# Patient Record
Sex: MALE | Race: WHITE | URBAN - METROPOLITAN AREA
[De-identification: names, ages, dates, MRNs, and addresses within clinical notes are randomized per-mention and may not be internally consistent; named-entity substitution may affect disease eponyms.]

---

## 2017-01-03 ENCOUNTER — OUTPATIENT (OUTPATIENT)
Dept: OUTPATIENT SERVICES | Facility: HOSPITAL | Age: 77
LOS: 1 days | Discharge: HOME | End: 2017-01-03

## 2017-06-13 ENCOUNTER — OUTPATIENT (OUTPATIENT)
Dept: OUTPATIENT SERVICES | Facility: HOSPITAL | Age: 77
LOS: 1 days | Discharge: HOME | End: 2017-06-13

## 2017-06-13 DIAGNOSIS — R07.9 CHEST PAIN, UNSPECIFIED: ICD-10-CM

## 2017-06-13 DIAGNOSIS — I82.409 ACUTE EMBOLISM AND THROMBOSIS OF UNSPECIFIED DEEP VEINS OF UNSPECIFIED LOWER EXTREMITY: ICD-10-CM

## 2017-06-13 DIAGNOSIS — I10 ESSENTIAL (PRIMARY) HYPERTENSION: ICD-10-CM

## 2017-06-13 DIAGNOSIS — N40.0 BENIGN PROSTATIC HYPERPLASIA WITHOUT LOWER URINARY TRACT SYMPTOMS: ICD-10-CM

## 2017-06-13 DIAGNOSIS — I35.0 NONRHEUMATIC AORTIC (VALVE) STENOSIS: ICD-10-CM

## 2017-06-22 ENCOUNTER — OUTPATIENT (OUTPATIENT)
Dept: OUTPATIENT SERVICES | Facility: HOSPITAL | Age: 77
LOS: 1 days | Discharge: HOME | End: 2017-06-22

## 2017-06-22 DIAGNOSIS — R07.9 CHEST PAIN, UNSPECIFIED: ICD-10-CM

## 2017-06-22 DIAGNOSIS — I35.0 NONRHEUMATIC AORTIC (VALVE) STENOSIS: ICD-10-CM

## 2017-06-22 DIAGNOSIS — I10 ESSENTIAL (PRIMARY) HYPERTENSION: ICD-10-CM

## 2017-06-22 DIAGNOSIS — N40.0 BENIGN PROSTATIC HYPERPLASIA WITHOUT LOWER URINARY TRACT SYMPTOMS: ICD-10-CM

## 2017-06-22 DIAGNOSIS — I82.409 ACUTE EMBOLISM AND THROMBOSIS OF UNSPECIFIED DEEP VEINS OF UNSPECIFIED LOWER EXTREMITY: ICD-10-CM

## 2017-06-28 DIAGNOSIS — I48.91 UNSPECIFIED ATRIAL FIBRILLATION: ICD-10-CM

## 2017-06-28 DIAGNOSIS — Z79.01 LONG TERM (CURRENT) USE OF ANTICOAGULANTS: ICD-10-CM

## 2017-06-29 ENCOUNTER — OUTPATIENT (OUTPATIENT)
Dept: OUTPATIENT SERVICES | Facility: HOSPITAL | Age: 77
LOS: 1 days | Discharge: HOME | End: 2017-06-29

## 2017-06-29 DIAGNOSIS — I48.91 UNSPECIFIED ATRIAL FIBRILLATION: ICD-10-CM

## 2017-06-29 DIAGNOSIS — N40.0 BENIGN PROSTATIC HYPERPLASIA WITHOUT LOWER URINARY TRACT SYMPTOMS: ICD-10-CM

## 2017-06-29 DIAGNOSIS — I82.409 ACUTE EMBOLISM AND THROMBOSIS OF UNSPECIFIED DEEP VEINS OF UNSPECIFIED LOWER EXTREMITY: ICD-10-CM

## 2017-06-29 DIAGNOSIS — I10 ESSENTIAL (PRIMARY) HYPERTENSION: ICD-10-CM

## 2017-06-29 DIAGNOSIS — R07.9 CHEST PAIN, UNSPECIFIED: ICD-10-CM

## 2017-06-29 DIAGNOSIS — I35.0 NONRHEUMATIC AORTIC (VALVE) STENOSIS: ICD-10-CM

## 2017-06-29 DIAGNOSIS — Z79.01 LONG TERM (CURRENT) USE OF ANTICOAGULANTS: ICD-10-CM

## 2017-07-12 ENCOUNTER — OUTPATIENT (OUTPATIENT)
Dept: OUTPATIENT SERVICES | Facility: HOSPITAL | Age: 77
LOS: 1 days | Discharge: HOME | End: 2017-07-12

## 2017-07-12 DIAGNOSIS — N40.0 BENIGN PROSTATIC HYPERPLASIA WITHOUT LOWER URINARY TRACT SYMPTOMS: ICD-10-CM

## 2017-07-12 DIAGNOSIS — Z79.01 LONG TERM (CURRENT) USE OF ANTICOAGULANTS: ICD-10-CM

## 2017-07-12 DIAGNOSIS — I35.0 NONRHEUMATIC AORTIC (VALVE) STENOSIS: ICD-10-CM

## 2017-07-12 DIAGNOSIS — I48.91 UNSPECIFIED ATRIAL FIBRILLATION: ICD-10-CM

## 2017-07-12 DIAGNOSIS — I82.409 ACUTE EMBOLISM AND THROMBOSIS OF UNSPECIFIED DEEP VEINS OF UNSPECIFIED LOWER EXTREMITY: ICD-10-CM

## 2017-07-12 DIAGNOSIS — I10 ESSENTIAL (PRIMARY) HYPERTENSION: ICD-10-CM

## 2017-07-12 DIAGNOSIS — R07.9 CHEST PAIN, UNSPECIFIED: ICD-10-CM

## 2017-08-02 ENCOUNTER — OUTPATIENT (OUTPATIENT)
Dept: OUTPATIENT SERVICES | Facility: HOSPITAL | Age: 77
LOS: 1 days | Discharge: HOME | End: 2017-08-02

## 2017-08-02 DIAGNOSIS — R07.9 CHEST PAIN, UNSPECIFIED: ICD-10-CM

## 2017-08-02 DIAGNOSIS — N40.0 BENIGN PROSTATIC HYPERPLASIA WITHOUT LOWER URINARY TRACT SYMPTOMS: ICD-10-CM

## 2017-08-02 DIAGNOSIS — I82.409 ACUTE EMBOLISM AND THROMBOSIS OF UNSPECIFIED DEEP VEINS OF UNSPECIFIED LOWER EXTREMITY: ICD-10-CM

## 2017-08-02 DIAGNOSIS — I10 ESSENTIAL (PRIMARY) HYPERTENSION: ICD-10-CM

## 2017-08-02 DIAGNOSIS — I48.91 UNSPECIFIED ATRIAL FIBRILLATION: ICD-10-CM

## 2017-08-02 DIAGNOSIS — Z79.01 LONG TERM (CURRENT) USE OF ANTICOAGULANTS: ICD-10-CM

## 2017-08-02 DIAGNOSIS — I35.0 NONRHEUMATIC AORTIC (VALVE) STENOSIS: ICD-10-CM

## 2017-08-31 ENCOUNTER — OUTPATIENT (OUTPATIENT)
Dept: OUTPATIENT SERVICES | Facility: HOSPITAL | Age: 77
LOS: 1 days | Discharge: HOME | End: 2017-08-31

## 2017-08-31 DIAGNOSIS — N40.0 BENIGN PROSTATIC HYPERPLASIA WITHOUT LOWER URINARY TRACT SYMPTOMS: ICD-10-CM

## 2017-08-31 DIAGNOSIS — R07.9 CHEST PAIN, UNSPECIFIED: ICD-10-CM

## 2017-08-31 DIAGNOSIS — I10 ESSENTIAL (PRIMARY) HYPERTENSION: ICD-10-CM

## 2017-08-31 DIAGNOSIS — I82.409 ACUTE EMBOLISM AND THROMBOSIS OF UNSPECIFIED DEEP VEINS OF UNSPECIFIED LOWER EXTREMITY: ICD-10-CM

## 2017-08-31 DIAGNOSIS — I35.0 NONRHEUMATIC AORTIC (VALVE) STENOSIS: ICD-10-CM

## 2017-08-31 DIAGNOSIS — Z79.01 LONG TERM (CURRENT) USE OF ANTICOAGULANTS: ICD-10-CM

## 2017-08-31 DIAGNOSIS — I48.91 UNSPECIFIED ATRIAL FIBRILLATION: ICD-10-CM

## 2017-09-05 ENCOUNTER — OUTPATIENT (OUTPATIENT)
Dept: OUTPATIENT SERVICES | Facility: HOSPITAL | Age: 77
LOS: 1 days | Discharge: HOME | End: 2017-09-05

## 2017-09-05 DIAGNOSIS — I48.91 UNSPECIFIED ATRIAL FIBRILLATION: ICD-10-CM

## 2017-09-05 DIAGNOSIS — I82.409 ACUTE EMBOLISM AND THROMBOSIS OF UNSPECIFIED DEEP VEINS OF UNSPECIFIED LOWER EXTREMITY: ICD-10-CM

## 2017-09-05 DIAGNOSIS — N40.0 BENIGN PROSTATIC HYPERPLASIA WITHOUT LOWER URINARY TRACT SYMPTOMS: ICD-10-CM

## 2017-09-05 DIAGNOSIS — I10 ESSENTIAL (PRIMARY) HYPERTENSION: ICD-10-CM

## 2017-09-05 DIAGNOSIS — Z79.01 LONG TERM (CURRENT) USE OF ANTICOAGULANTS: ICD-10-CM

## 2017-09-05 DIAGNOSIS — R07.9 CHEST PAIN, UNSPECIFIED: ICD-10-CM

## 2017-09-05 DIAGNOSIS — I35.0 NONRHEUMATIC AORTIC (VALVE) STENOSIS: ICD-10-CM

## 2017-09-07 DIAGNOSIS — Z79.01 LONG TERM (CURRENT) USE OF ANTICOAGULANTS: ICD-10-CM

## 2017-09-07 DIAGNOSIS — I48.91 UNSPECIFIED ATRIAL FIBRILLATION: ICD-10-CM

## 2017-09-14 ENCOUNTER — OUTPATIENT (OUTPATIENT)
Dept: OUTPATIENT SERVICES | Facility: HOSPITAL | Age: 77
LOS: 1 days | Discharge: HOME | End: 2017-09-14

## 2017-09-14 DIAGNOSIS — I10 ESSENTIAL (PRIMARY) HYPERTENSION: ICD-10-CM

## 2017-09-14 DIAGNOSIS — I82.409 ACUTE EMBOLISM AND THROMBOSIS OF UNSPECIFIED DEEP VEINS OF UNSPECIFIED LOWER EXTREMITY: ICD-10-CM

## 2017-09-14 DIAGNOSIS — I35.0 NONRHEUMATIC AORTIC (VALVE) STENOSIS: ICD-10-CM

## 2017-09-14 DIAGNOSIS — R07.9 CHEST PAIN, UNSPECIFIED: ICD-10-CM

## 2017-09-14 DIAGNOSIS — Z79.01 LONG TERM (CURRENT) USE OF ANTICOAGULANTS: ICD-10-CM

## 2017-09-14 DIAGNOSIS — N40.0 BENIGN PROSTATIC HYPERPLASIA WITHOUT LOWER URINARY TRACT SYMPTOMS: ICD-10-CM

## 2017-09-14 DIAGNOSIS — I48.91 UNSPECIFIED ATRIAL FIBRILLATION: ICD-10-CM

## 2017-10-12 ENCOUNTER — OUTPATIENT (OUTPATIENT)
Dept: OUTPATIENT SERVICES | Facility: HOSPITAL | Age: 77
LOS: 1 days | Discharge: HOME | End: 2017-10-12

## 2017-10-12 DIAGNOSIS — I10 ESSENTIAL (PRIMARY) HYPERTENSION: ICD-10-CM

## 2017-10-12 DIAGNOSIS — Z79.01 LONG TERM (CURRENT) USE OF ANTICOAGULANTS: ICD-10-CM

## 2017-10-12 DIAGNOSIS — R07.9 CHEST PAIN, UNSPECIFIED: ICD-10-CM

## 2017-10-12 DIAGNOSIS — N40.0 BENIGN PROSTATIC HYPERPLASIA WITHOUT LOWER URINARY TRACT SYMPTOMS: ICD-10-CM

## 2017-10-12 DIAGNOSIS — I35.0 NONRHEUMATIC AORTIC (VALVE) STENOSIS: ICD-10-CM

## 2017-10-12 DIAGNOSIS — I82.409 ACUTE EMBOLISM AND THROMBOSIS OF UNSPECIFIED DEEP VEINS OF UNSPECIFIED LOWER EXTREMITY: ICD-10-CM

## 2017-10-12 DIAGNOSIS — I48.91 UNSPECIFIED ATRIAL FIBRILLATION: ICD-10-CM

## 2017-10-26 ENCOUNTER — OUTPATIENT (OUTPATIENT)
Dept: OUTPATIENT SERVICES | Facility: HOSPITAL | Age: 77
LOS: 1 days | Discharge: HOME | End: 2017-10-26

## 2017-10-26 DIAGNOSIS — Z79.01 LONG TERM (CURRENT) USE OF ANTICOAGULANTS: ICD-10-CM

## 2017-10-26 DIAGNOSIS — I48.91 UNSPECIFIED ATRIAL FIBRILLATION: ICD-10-CM

## 2017-10-26 DIAGNOSIS — I10 ESSENTIAL (PRIMARY) HYPERTENSION: ICD-10-CM

## 2017-10-26 DIAGNOSIS — R07.9 CHEST PAIN, UNSPECIFIED: ICD-10-CM

## 2017-10-26 DIAGNOSIS — I35.0 NONRHEUMATIC AORTIC (VALVE) STENOSIS: ICD-10-CM

## 2017-10-26 DIAGNOSIS — I82.409 ACUTE EMBOLISM AND THROMBOSIS OF UNSPECIFIED DEEP VEINS OF UNSPECIFIED LOWER EXTREMITY: ICD-10-CM

## 2017-10-26 DIAGNOSIS — N40.0 BENIGN PROSTATIC HYPERPLASIA WITHOUT LOWER URINARY TRACT SYMPTOMS: ICD-10-CM

## 2017-11-02 ENCOUNTER — OUTPATIENT (OUTPATIENT)
Dept: OUTPATIENT SERVICES | Facility: HOSPITAL | Age: 77
LOS: 1 days | Discharge: HOME | End: 2017-11-02

## 2017-11-02 DIAGNOSIS — I10 ESSENTIAL (PRIMARY) HYPERTENSION: ICD-10-CM

## 2017-11-02 DIAGNOSIS — I35.0 NONRHEUMATIC AORTIC (VALVE) STENOSIS: ICD-10-CM

## 2017-11-02 DIAGNOSIS — I82.409 ACUTE EMBOLISM AND THROMBOSIS OF UNSPECIFIED DEEP VEINS OF UNSPECIFIED LOWER EXTREMITY: ICD-10-CM

## 2017-11-02 DIAGNOSIS — Z79.01 LONG TERM (CURRENT) USE OF ANTICOAGULANTS: ICD-10-CM

## 2017-11-02 DIAGNOSIS — N40.0 BENIGN PROSTATIC HYPERPLASIA WITHOUT LOWER URINARY TRACT SYMPTOMS: ICD-10-CM

## 2017-11-02 DIAGNOSIS — R07.9 CHEST PAIN, UNSPECIFIED: ICD-10-CM

## 2017-11-02 DIAGNOSIS — I48.91 UNSPECIFIED ATRIAL FIBRILLATION: ICD-10-CM

## 2017-11-09 ENCOUNTER — OUTPATIENT (OUTPATIENT)
Dept: OUTPATIENT SERVICES | Facility: HOSPITAL | Age: 77
LOS: 1 days | Discharge: HOME | End: 2017-11-09

## 2017-11-09 DIAGNOSIS — Z79.01 LONG TERM (CURRENT) USE OF ANTICOAGULANTS: ICD-10-CM

## 2017-11-09 DIAGNOSIS — I48.91 UNSPECIFIED ATRIAL FIBRILLATION: ICD-10-CM

## 2017-11-09 DIAGNOSIS — N40.0 BENIGN PROSTATIC HYPERPLASIA WITHOUT LOWER URINARY TRACT SYMPTOMS: ICD-10-CM

## 2017-11-09 DIAGNOSIS — I10 ESSENTIAL (PRIMARY) HYPERTENSION: ICD-10-CM

## 2017-11-09 DIAGNOSIS — I82.409 ACUTE EMBOLISM AND THROMBOSIS OF UNSPECIFIED DEEP VEINS OF UNSPECIFIED LOWER EXTREMITY: ICD-10-CM

## 2017-11-09 DIAGNOSIS — R07.9 CHEST PAIN, UNSPECIFIED: ICD-10-CM

## 2017-11-09 DIAGNOSIS — I35.0 NONRHEUMATIC AORTIC (VALVE) STENOSIS: ICD-10-CM

## 2017-11-22 ENCOUNTER — OUTPATIENT (OUTPATIENT)
Dept: OUTPATIENT SERVICES | Facility: HOSPITAL | Age: 77
LOS: 1 days | Discharge: HOME | End: 2017-11-22

## 2017-11-22 DIAGNOSIS — R07.9 CHEST PAIN, UNSPECIFIED: ICD-10-CM

## 2017-11-22 DIAGNOSIS — I10 ESSENTIAL (PRIMARY) HYPERTENSION: ICD-10-CM

## 2017-11-22 DIAGNOSIS — N40.0 BENIGN PROSTATIC HYPERPLASIA WITHOUT LOWER URINARY TRACT SYMPTOMS: ICD-10-CM

## 2017-11-22 DIAGNOSIS — I82.409 ACUTE EMBOLISM AND THROMBOSIS OF UNSPECIFIED DEEP VEINS OF UNSPECIFIED LOWER EXTREMITY: ICD-10-CM

## 2017-11-22 DIAGNOSIS — I35.0 NONRHEUMATIC AORTIC (VALVE) STENOSIS: ICD-10-CM

## 2017-11-22 DIAGNOSIS — Z79.01 LONG TERM (CURRENT) USE OF ANTICOAGULANTS: ICD-10-CM

## 2017-11-22 DIAGNOSIS — I48.91 UNSPECIFIED ATRIAL FIBRILLATION: ICD-10-CM

## 2017-12-13 ENCOUNTER — OUTPATIENT (OUTPATIENT)
Dept: OUTPATIENT SERVICES | Facility: HOSPITAL | Age: 77
LOS: 1 days | Discharge: HOME | End: 2017-12-13

## 2017-12-13 DIAGNOSIS — I48.91 UNSPECIFIED ATRIAL FIBRILLATION: ICD-10-CM

## 2017-12-13 DIAGNOSIS — I35.0 NONRHEUMATIC AORTIC (VALVE) STENOSIS: ICD-10-CM

## 2017-12-13 DIAGNOSIS — I10 ESSENTIAL (PRIMARY) HYPERTENSION: ICD-10-CM

## 2017-12-13 DIAGNOSIS — Z79.01 LONG TERM (CURRENT) USE OF ANTICOAGULANTS: ICD-10-CM

## 2017-12-13 DIAGNOSIS — I82.409 ACUTE EMBOLISM AND THROMBOSIS OF UNSPECIFIED DEEP VEINS OF UNSPECIFIED LOWER EXTREMITY: ICD-10-CM

## 2017-12-13 DIAGNOSIS — R07.9 CHEST PAIN, UNSPECIFIED: ICD-10-CM

## 2017-12-13 DIAGNOSIS — N40.0 BENIGN PROSTATIC HYPERPLASIA WITHOUT LOWER URINARY TRACT SYMPTOMS: ICD-10-CM

## 2018-01-03 ENCOUNTER — OUTPATIENT (OUTPATIENT)
Dept: OUTPATIENT SERVICES | Facility: HOSPITAL | Age: 78
LOS: 1 days | Discharge: HOME | End: 2018-01-03

## 2018-01-03 DIAGNOSIS — I48.91 UNSPECIFIED ATRIAL FIBRILLATION: ICD-10-CM

## 2018-01-03 DIAGNOSIS — I35.0 NONRHEUMATIC AORTIC (VALVE) STENOSIS: ICD-10-CM

## 2018-01-03 DIAGNOSIS — Z79.01 LONG TERM (CURRENT) USE OF ANTICOAGULANTS: ICD-10-CM

## 2018-01-03 DIAGNOSIS — N40.0 BENIGN PROSTATIC HYPERPLASIA WITHOUT LOWER URINARY TRACT SYMPTOMS: ICD-10-CM

## 2018-01-03 DIAGNOSIS — I82.409 ACUTE EMBOLISM AND THROMBOSIS OF UNSPECIFIED DEEP VEINS OF UNSPECIFIED LOWER EXTREMITY: ICD-10-CM

## 2018-01-03 DIAGNOSIS — R07.9 CHEST PAIN, UNSPECIFIED: ICD-10-CM

## 2018-01-03 DIAGNOSIS — I10 ESSENTIAL (PRIMARY) HYPERTENSION: ICD-10-CM

## 2018-01-10 ENCOUNTER — OUTPATIENT (OUTPATIENT)
Dept: OUTPATIENT SERVICES | Facility: HOSPITAL | Age: 78
LOS: 1 days | Discharge: HOME | End: 2018-01-10

## 2018-01-10 DIAGNOSIS — I35.0 NONRHEUMATIC AORTIC (VALVE) STENOSIS: ICD-10-CM

## 2018-01-10 DIAGNOSIS — R07.9 CHEST PAIN, UNSPECIFIED: ICD-10-CM

## 2018-01-10 DIAGNOSIS — Z79.01 LONG TERM (CURRENT) USE OF ANTICOAGULANTS: ICD-10-CM

## 2018-01-10 DIAGNOSIS — I10 ESSENTIAL (PRIMARY) HYPERTENSION: ICD-10-CM

## 2018-01-10 DIAGNOSIS — I82.409 ACUTE EMBOLISM AND THROMBOSIS OF UNSPECIFIED DEEP VEINS OF UNSPECIFIED LOWER EXTREMITY: ICD-10-CM

## 2018-01-10 DIAGNOSIS — N40.0 BENIGN PROSTATIC HYPERPLASIA WITHOUT LOWER URINARY TRACT SYMPTOMS: ICD-10-CM

## 2018-01-10 DIAGNOSIS — I48.91 UNSPECIFIED ATRIAL FIBRILLATION: ICD-10-CM

## 2018-01-22 PROBLEM — Z00.00 ENCOUNTER FOR PREVENTIVE HEALTH EXAMINATION: Status: ACTIVE | Noted: 2018-01-22

## 2018-01-24 ENCOUNTER — OUTPATIENT (OUTPATIENT)
Dept: OUTPATIENT SERVICES | Facility: HOSPITAL | Age: 78
LOS: 1 days | Discharge: HOME | End: 2018-01-24

## 2018-01-24 DIAGNOSIS — I48.91 UNSPECIFIED ATRIAL FIBRILLATION: ICD-10-CM

## 2018-01-24 DIAGNOSIS — Z79.01 LONG TERM (CURRENT) USE OF ANTICOAGULANTS: ICD-10-CM

## 2018-02-02 ENCOUNTER — APPOINTMENT (OUTPATIENT)
Dept: UROLOGY | Facility: CLINIC | Age: 78
End: 2018-02-02
Payer: MEDICARE

## 2018-02-02 VITALS
HEIGHT: 70 IN | HEART RATE: 61 BPM | BODY MASS INDEX: 27.2 KG/M2 | DIASTOLIC BLOOD PRESSURE: 64 MMHG | SYSTOLIC BLOOD PRESSURE: 155 MMHG | WEIGHT: 190 LBS

## 2018-02-02 DIAGNOSIS — Z80.9 FAMILY HISTORY OF MALIGNANT NEOPLASM, UNSPECIFIED: ICD-10-CM

## 2018-02-02 DIAGNOSIS — N40.0 BENIGN PROSTATIC HYPERPLASIA WITHOUT LOWER URINARY TRACT SYMPMS: ICD-10-CM

## 2018-02-02 DIAGNOSIS — Z78.9 OTHER SPECIFIED HEALTH STATUS: ICD-10-CM

## 2018-02-02 PROCEDURE — 99213 OFFICE O/P EST LOW 20 MIN: CPT

## 2018-02-02 RX ORDER — LOSARTAN POTASSIUM 25 MG/1
25 TABLET, FILM COATED ORAL
Qty: 180 | Refills: 0 | Status: ACTIVE | COMMUNITY
Start: 2017-12-18

## 2018-02-02 RX ORDER — ATORVASTATIN CALCIUM 10 MG/1
10 TABLET, FILM COATED ORAL
Qty: 90 | Refills: 0 | Status: ACTIVE | COMMUNITY
Start: 2017-12-15

## 2018-02-02 RX ORDER — TAMSULOSIN HYDROCHLORIDE 0.4 MG/1
0.4 CAPSULE ORAL
Qty: 180 | Refills: 0 | Status: ACTIVE | COMMUNITY
Start: 2017-01-13

## 2018-02-02 RX ORDER — FOLIC ACID 1 MG/1
1 TABLET ORAL
Qty: 90 | Refills: 0 | Status: ACTIVE | COMMUNITY
Start: 2017-11-08

## 2018-02-02 RX ORDER — ASPIRIN ENTERIC COATED TABLETS 81 MG 81 MG/1
81 TABLET, DELAYED RELEASE ORAL
Refills: 0 | Status: ACTIVE | COMMUNITY

## 2018-02-02 RX ORDER — FINASTERIDE 5 MG/1
5 TABLET, FILM COATED ORAL
Qty: 90 | Refills: 0 | Status: ACTIVE | COMMUNITY
Start: 2018-01-22

## 2018-02-04 DIAGNOSIS — R07.9 CHEST PAIN, UNSPECIFIED: ICD-10-CM

## 2018-02-04 DIAGNOSIS — I82.409 ACUTE EMBOLISM AND THROMBOSIS OF UNSPECIFIED DEEP VEINS OF UNSPECIFIED LOWER EXTREMITY: ICD-10-CM

## 2018-02-04 DIAGNOSIS — N40.0 BENIGN PROSTATIC HYPERPLASIA WITHOUT LOWER URINARY TRACT SYMPTOMS: ICD-10-CM

## 2018-02-04 DIAGNOSIS — I35.0 NONRHEUMATIC AORTIC (VALVE) STENOSIS: ICD-10-CM

## 2018-02-04 DIAGNOSIS — I10 ESSENTIAL (PRIMARY) HYPERTENSION: ICD-10-CM

## 2018-02-14 ENCOUNTER — OUTPATIENT (OUTPATIENT)
Dept: OUTPATIENT SERVICES | Facility: HOSPITAL | Age: 78
LOS: 1 days | Discharge: HOME | End: 2018-02-14

## 2018-02-14 DIAGNOSIS — I48.91 UNSPECIFIED ATRIAL FIBRILLATION: ICD-10-CM

## 2018-02-14 DIAGNOSIS — Z79.01 LONG TERM (CURRENT) USE OF ANTICOAGULANTS: ICD-10-CM

## 2018-02-21 ENCOUNTER — OUTPATIENT (OUTPATIENT)
Dept: OUTPATIENT SERVICES | Facility: HOSPITAL | Age: 78
LOS: 1 days | Discharge: HOME | End: 2018-02-21

## 2018-02-21 DIAGNOSIS — I48.91 UNSPECIFIED ATRIAL FIBRILLATION: ICD-10-CM

## 2018-02-21 DIAGNOSIS — Z79.01 LONG TERM (CURRENT) USE OF ANTICOAGULANTS: ICD-10-CM

## 2018-03-14 ENCOUNTER — OUTPATIENT (OUTPATIENT)
Dept: OUTPATIENT SERVICES | Facility: HOSPITAL | Age: 78
LOS: 1 days | Discharge: HOME | End: 2018-03-14

## 2018-03-14 DIAGNOSIS — Z79.01 LONG TERM (CURRENT) USE OF ANTICOAGULANTS: ICD-10-CM

## 2018-03-14 DIAGNOSIS — I48.91 UNSPECIFIED ATRIAL FIBRILLATION: ICD-10-CM

## 2018-04-12 ENCOUNTER — OUTPATIENT (OUTPATIENT)
Dept: OUTPATIENT SERVICES | Facility: HOSPITAL | Age: 78
LOS: 1 days | Discharge: HOME | End: 2018-04-12

## 2018-04-12 DIAGNOSIS — I48.91 UNSPECIFIED ATRIAL FIBRILLATION: ICD-10-CM

## 2018-04-12 DIAGNOSIS — Z79.01 LONG TERM (CURRENT) USE OF ANTICOAGULANTS: ICD-10-CM

## 2018-04-26 ENCOUNTER — OUTPATIENT (OUTPATIENT)
Dept: OUTPATIENT SERVICES | Facility: HOSPITAL | Age: 78
LOS: 1 days | Discharge: HOME | End: 2018-04-26

## 2018-04-26 DIAGNOSIS — Z79.01 LONG TERM (CURRENT) USE OF ANTICOAGULANTS: ICD-10-CM

## 2018-04-26 DIAGNOSIS — I48.91 UNSPECIFIED ATRIAL FIBRILLATION: ICD-10-CM

## 2018-05-17 ENCOUNTER — OUTPATIENT (OUTPATIENT)
Dept: OUTPATIENT SERVICES | Facility: HOSPITAL | Age: 78
LOS: 1 days | Discharge: HOME | End: 2018-05-17

## 2018-05-17 DIAGNOSIS — I48.91 UNSPECIFIED ATRIAL FIBRILLATION: ICD-10-CM

## 2018-05-17 DIAGNOSIS — Z79.01 LONG TERM (CURRENT) USE OF ANTICOAGULANTS: ICD-10-CM

## 2018-06-14 ENCOUNTER — OUTPATIENT (OUTPATIENT)
Dept: OUTPATIENT SERVICES | Facility: HOSPITAL | Age: 78
LOS: 1 days | Discharge: HOME | End: 2018-06-14

## 2018-06-14 DIAGNOSIS — I48.91 UNSPECIFIED ATRIAL FIBRILLATION: ICD-10-CM

## 2018-06-14 DIAGNOSIS — Z79.01 LONG TERM (CURRENT) USE OF ANTICOAGULANTS: ICD-10-CM

## 2018-07-12 ENCOUNTER — OUTPATIENT (OUTPATIENT)
Dept: OUTPATIENT SERVICES | Facility: HOSPITAL | Age: 78
LOS: 1 days | Discharge: HOME | End: 2018-07-12

## 2018-07-12 DIAGNOSIS — Z79.01 LONG TERM (CURRENT) USE OF ANTICOAGULANTS: ICD-10-CM

## 2018-07-12 DIAGNOSIS — I48.91 UNSPECIFIED ATRIAL FIBRILLATION: ICD-10-CM

## 2018-08-09 ENCOUNTER — OUTPATIENT (OUTPATIENT)
Dept: OUTPATIENT SERVICES | Facility: HOSPITAL | Age: 78
LOS: 1 days | Discharge: HOME | End: 2018-08-09

## 2018-08-09 DIAGNOSIS — Z79.01 LONG TERM (CURRENT) USE OF ANTICOAGULANTS: ICD-10-CM

## 2018-08-09 DIAGNOSIS — I48.91 UNSPECIFIED ATRIAL FIBRILLATION: ICD-10-CM

## 2018-09-14 ENCOUNTER — OUTPATIENT (OUTPATIENT)
Dept: OUTPATIENT SERVICES | Facility: HOSPITAL | Age: 78
LOS: 1 days | Discharge: HOME | End: 2018-09-14

## 2018-09-14 LAB
POCT INR: 2.6 RATIO — HIGH (ref 0.9–1.2)
POCT PT: 31.5 SEC — HIGH (ref 10–13.4)

## 2018-10-19 ENCOUNTER — OUTPATIENT (OUTPATIENT)
Dept: OUTPATIENT SERVICES | Facility: HOSPITAL | Age: 78
LOS: 1 days | Discharge: HOME | End: 2018-10-19

## 2018-10-19 DIAGNOSIS — I48.91 UNSPECIFIED ATRIAL FIBRILLATION: ICD-10-CM

## 2018-10-19 DIAGNOSIS — Z79.01 LONG TERM (CURRENT) USE OF ANTICOAGULANTS: ICD-10-CM

## 2018-10-19 LAB
POCT INR: 3.3 RATIO — HIGH (ref 0.9–1.2)
POCT PT: 39.3 SEC — HIGH (ref 10–13.4)

## 2018-11-06 ENCOUNTER — OUTPATIENT (OUTPATIENT)
Dept: OUTPATIENT SERVICES | Facility: HOSPITAL | Age: 78
LOS: 1 days | Discharge: HOME | End: 2018-11-06

## 2018-11-06 DIAGNOSIS — Z79.01 LONG TERM (CURRENT) USE OF ANTICOAGULANTS: ICD-10-CM

## 2018-11-06 DIAGNOSIS — I48.91 UNSPECIFIED ATRIAL FIBRILLATION: ICD-10-CM

## 2018-11-06 LAB
POCT INR: 2.7 RATIO — HIGH (ref 0.9–1.2)
POCT PT: 32.6 SEC — HIGH (ref 10–13.4)

## 2018-12-04 ENCOUNTER — OUTPATIENT (OUTPATIENT)
Dept: OUTPATIENT SERVICES | Facility: HOSPITAL | Age: 78
LOS: 1 days | Discharge: HOME | End: 2018-12-04

## 2018-12-04 DIAGNOSIS — Z79.01 LONG TERM (CURRENT) USE OF ANTICOAGULANTS: ICD-10-CM

## 2018-12-04 DIAGNOSIS — I48.91 UNSPECIFIED ATRIAL FIBRILLATION: ICD-10-CM

## 2018-12-04 LAB
POCT INR: 2.4 RATIO — HIGH (ref 0.9–1.2)
POCT PT: 29.3 SEC — HIGH (ref 10–13.4)

## 2019-01-11 ENCOUNTER — OUTPATIENT (OUTPATIENT)
Dept: OUTPATIENT SERVICES | Facility: HOSPITAL | Age: 79
LOS: 1 days | Discharge: HOME | End: 2019-01-11

## 2019-01-11 DIAGNOSIS — Z79.01 LONG TERM (CURRENT) USE OF ANTICOAGULANTS: ICD-10-CM

## 2019-01-11 DIAGNOSIS — I48.91 UNSPECIFIED ATRIAL FIBRILLATION: ICD-10-CM

## 2019-01-11 LAB
POCT INR: 4.4 RATIO — HIGH (ref 0.9–1.2)
POCT PT: 53 SEC — HIGH (ref 10–13.4)

## 2019-01-22 ENCOUNTER — OUTPATIENT (OUTPATIENT)
Dept: OUTPATIENT SERVICES | Facility: HOSPITAL | Age: 79
LOS: 1 days | Discharge: HOME | End: 2019-01-22

## 2019-01-22 DIAGNOSIS — I48.91 UNSPECIFIED ATRIAL FIBRILLATION: ICD-10-CM

## 2019-01-22 DIAGNOSIS — Z79.01 LONG TERM (CURRENT) USE OF ANTICOAGULANTS: ICD-10-CM

## 2019-01-22 LAB
POCT INR: 3 RATIO — HIGH (ref 0.9–1.2)
POCT PT: 36.1 SEC — HIGH (ref 10–13.4)

## 2019-02-13 ENCOUNTER — OUTPATIENT (OUTPATIENT)
Dept: OUTPATIENT SERVICES | Facility: HOSPITAL | Age: 79
LOS: 1 days | Discharge: HOME | End: 2019-02-13

## 2019-02-13 DIAGNOSIS — Z79.01 LONG TERM (CURRENT) USE OF ANTICOAGULANTS: ICD-10-CM

## 2019-02-13 DIAGNOSIS — I48.91 UNSPECIFIED ATRIAL FIBRILLATION: ICD-10-CM

## 2019-02-13 LAB
POCT INR: 1.8 RATIO — HIGH (ref 0.9–1.2)
POCT PT: 21.2 SEC — HIGH (ref 10–13.4)

## 2019-03-04 ENCOUNTER — OUTPATIENT (OUTPATIENT)
Dept: OUTPATIENT SERVICES | Facility: HOSPITAL | Age: 79
LOS: 1 days | Discharge: HOME | End: 2019-03-04

## 2019-03-04 DIAGNOSIS — I48.91 UNSPECIFIED ATRIAL FIBRILLATION: ICD-10-CM

## 2019-03-04 DIAGNOSIS — Z79.01 LONG TERM (CURRENT) USE OF ANTICOAGULANTS: ICD-10-CM

## 2019-03-04 LAB
POCT INR: 2.2 RATIO — HIGH (ref 0.9–1.2)
POCT PT: 26.4 SEC — HIGH (ref 10–13.4)

## 2019-04-02 ENCOUNTER — OUTPATIENT (OUTPATIENT)
Dept: OUTPATIENT SERVICES | Facility: HOSPITAL | Age: 79
LOS: 1 days | Discharge: HOME | End: 2019-04-02

## 2019-04-02 DIAGNOSIS — I48.91 UNSPECIFIED ATRIAL FIBRILLATION: ICD-10-CM

## 2019-04-02 DIAGNOSIS — Z79.01 LONG TERM (CURRENT) USE OF ANTICOAGULANTS: ICD-10-CM

## 2019-04-02 LAB
POCT INR: 2.2 RATIO — HIGH (ref 0.9–1.2)
POCT PT: 26.2 SEC — HIGH (ref 10–13.4)

## 2019-04-30 ENCOUNTER — APPOINTMENT (OUTPATIENT)
Dept: UROLOGY | Facility: CLINIC | Age: 79
End: 2019-04-30
Payer: MEDICARE

## 2019-04-30 VITALS — WEIGHT: 190 LBS | BODY MASS INDEX: 27.2 KG/M2 | HEIGHT: 70 IN

## 2019-04-30 PROCEDURE — 99213 OFFICE O/P EST LOW 20 MIN: CPT

## 2019-04-30 NOTE — HISTORY OF PRESENT ILLNESS
[FreeTextEntry1] : 79-year-old with BPH and lower urinary tract symptoms for which he takes tamsulosin 20 daily, finasteride 5 mg daily, and VESIcare 5 mg twice a day. While taking this he has a good urinary stream nocturia x1 but some increasing daytime frequency and urgency.

## 2019-04-30 NOTE — REVIEW OF SYSTEMS
[Fever] : no fever [Chest Pain] : no chest pain [Constipation] : constipation [Dysuria] : no dysuria [Confused] : no confusion

## 2019-04-30 NOTE — PHYSICAL EXAM
[General Appearance - In No Acute Distress] : no acute distress [Prostate Tenderness] : the prostate was not tender [No Prostate Nodules] : no prostate nodules [Prostate Size ___ (0-4)] : prostate size [unfilled] (scale: 0-4) [Oriented To Time, Place, And Person] : oriented to person, place, and time [Normal Station and Gait] : the gait and station were normal for the patient's age

## 2019-04-30 NOTE — ASSESSMENT
[FreeTextEntry1] : Patient states his symptoms are not much different from one year ago and are manageable. He does get some constipation but is managed with stool softeners.

## 2019-05-07 ENCOUNTER — OUTPATIENT (OUTPATIENT)
Dept: OUTPATIENT SERVICES | Facility: HOSPITAL | Age: 79
LOS: 1 days | Discharge: HOME | End: 2019-05-07

## 2019-05-07 DIAGNOSIS — Z79.01 LONG TERM (CURRENT) USE OF ANTICOAGULANTS: ICD-10-CM

## 2019-05-07 DIAGNOSIS — I48.91 UNSPECIFIED ATRIAL FIBRILLATION: ICD-10-CM

## 2019-05-07 LAB
POCT INR: 2.6 RATIO — HIGH (ref 0.9–1.2)
POCT PT: 31.4 SEC — HIGH (ref 10–13.4)

## 2019-07-16 ENCOUNTER — APPOINTMENT (OUTPATIENT)
Dept: OPHTHALMOLOGY | Facility: CLINIC | Age: 79
End: 2019-07-16
Payer: MEDICARE

## 2019-07-16 ENCOUNTER — NON-APPOINTMENT (OUTPATIENT)
Age: 79
End: 2019-07-16

## 2019-07-16 PROCEDURE — 92004 COMPRE OPH EXAM NEW PT 1/>: CPT

## 2020-05-01 ENCOUNTER — APPOINTMENT (OUTPATIENT)
Dept: UROLOGY | Facility: CLINIC | Age: 80
End: 2020-05-01

## 2020-07-15 ENCOUNTER — APPOINTMENT (OUTPATIENT)
Dept: UROLOGY | Facility: CLINIC | Age: 80
End: 2020-07-15
Payer: MEDICARE

## 2020-07-15 VITALS — TEMPERATURE: 97.9 F | BODY MASS INDEX: 27.2 KG/M2 | WEIGHT: 190 LBS | HEIGHT: 70 IN

## 2020-07-15 DIAGNOSIS — R35.0 FREQUENCY OF MICTURITION: ICD-10-CM

## 2020-07-15 PROCEDURE — 99213 OFFICE O/P EST LOW 20 MIN: CPT

## 2020-07-15 NOTE — REVIEW OF SYSTEMS
[Fever] : no fever [Feeling Poorly] : not feeling poorly [Chest Pain] : no chest pain [Cough] : no cough [Constipation] : no constipation [Diarrhea] : no diarrhea [Confused] : no confusion

## 2020-07-15 NOTE — HISTORY OF PRESENT ILLNESS
[FreeTextEntry1] : 80-year-old with lower urinary tract symptoms for which he takes tamsulosin 0.4 daily, finasteride 5 mg daily and VESIcare 10 mg q.h.s. Currently he has good urinary stream, nocturia x1, no change in urinary frequency and urgency

## 2020-07-15 NOTE — PHYSICAL EXAM
[General Appearance - In No Acute Distress] : no acute distress [Abdomen Soft] : soft [Costovertebral Angle Tenderness] : no ~M costovertebral angle tenderness [Abdomen Tenderness] : non-tender [Oriented To Time, Place, And Person] : oriented to person, place, and time [] : no respiratory distress [Edema] : no peripheral edema [Normal Station and Gait] : the gait and station were normal for the patient's age

## 2020-07-22 ENCOUNTER — APPOINTMENT (OUTPATIENT)
Dept: NEUROLOGY | Facility: CLINIC | Age: 80
End: 2020-07-22
Payer: MEDICARE

## 2020-07-22 DIAGNOSIS — Z86.79 PERSONAL HISTORY OF OTHER DISEASES OF THE CIRCULATORY SYSTEM: ICD-10-CM

## 2020-07-22 PROCEDURE — 99204 OFFICE O/P NEW MOD 45 MIN: CPT | Mod: 95

## 2020-07-22 RX ORDER — METOPROLOL SUCCINATE 25 MG/1
25 TABLET, EXTENDED RELEASE ORAL
Qty: 45 | Refills: 0 | Status: DISCONTINUED | COMMUNITY
Start: 2017-08-14 | End: 2020-07-22

## 2020-07-22 RX ORDER — AMOXICILLIN AND CLAVULANATE POTASSIUM 500; 125 MG/1; MG/1
500-125 TABLET, FILM COATED ORAL
Qty: 14 | Refills: 0 | Status: DISCONTINUED | COMMUNITY
Start: 2017-08-17 | End: 2020-07-22

## 2020-07-22 RX ORDER — OMEPRAZOLE 40 MG/1
40 CAPSULE, DELAYED RELEASE ORAL
Qty: 90 | Refills: 0 | Status: DISCONTINUED | COMMUNITY
Start: 2017-10-09 | End: 2020-07-22

## 2020-07-22 NOTE — ASSESSMENT
[FreeTextEntry1] : This is an 81 yo RHM w/ MMP p/w progressive memory deficits s/o dementai Alzheimers type with minimal behavioral changes at this time.  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  \par \par Recommendations:\par - MRI Brain NC\par - check b/w\par - start Namenda XR titration pack and continue 28 mg QD\par - RTC 4 months

## 2020-07-22 NOTE — HISTORY OF PRESENT ILLNESS
[Home] : at home, [unfilled] , at the time of the visit. [Other Location: e.g. Home (Enter Location, City,State)___] : at [unfilled] [FreeTextEntry1] : This is an 81 yo RHF p/w progressive memory loss particularly with short-term memory over the last 2 years noted by family.  Has been forgetting where he places wallet, objects around the house, loses things and difficulty with simple tasks.  Has not been paying bills and adhering to his schedule since his wife has taken most household duties.  Still does outside yardwork and continues to be fastidious about appearances but family has noted that he often downplays his memory and cognitive issues.  Wife has noted some paranoia particularly at night and he often believes people are trying to hide things around the house.  Went on hunting trip w/ friends last year and had to leave early due to paranoia at night.  No significant agitation or short-temperedness.  Family denies any new hobbies or significant personality issues.  No hallucinations or agitation. No new obsessions or compulsions.  No signs of depression or anxiety.  Family noticed some decline after AVR in 2015 but realized 2 years prior significant memory issues.  Pt unable to recognize grandchildren and daughter in law and more recently has forgotten his son and wifes names.\par \par Had fallen 3 times earlier this year and now requires cane to ambulate.  Denies any new ataxia or bradykinesia.  Denies any tremors or abnormal movements.  No recent weight changes.  [Verbal consent obtained from patient] : the patient, [unfilled]

## 2020-07-22 NOTE — PHYSICAL EXAM
[FreeTextEntry1] : NAD, pleasant, conversant, follows complex commands\par awake, alert and oriented to person only, gives child address when asked, "2005," "[place]," doesn't recall wife's name initially.  language fluent w/ intact repetition and naming.  no dysarthria. No obvious neglect or gaze preference.\par CN grossly intact. EOMI. no nystagmus. no facial asymmetry.  tongue midline. \par NF/E and lateral rotation w/ FROM\par MS without drift in upper and lower extremities b/l\par able to sustain arms above head\par unable to stand on 1 leg\par toe/heel walk normal \par no dysmetria, dysdiadokinesia\par slight intention tremor \par FTN normal

## 2020-11-05 ENCOUNTER — APPOINTMENT (OUTPATIENT)
Dept: NEUROLOGY | Facility: CLINIC | Age: 80
End: 2020-11-05

## 2021-05-28 ENCOUNTER — OUTPATIENT (OUTPATIENT)
Dept: OUTPATIENT SERVICES | Facility: HOSPITAL | Age: 81
LOS: 1 days | Discharge: HOME | End: 2021-05-28
Payer: MEDICARE

## 2021-05-28 DIAGNOSIS — I82.403 ACUTE EMBOLISM AND THROMBOSIS OF UNSPECIFIED DEEP VEINS OF LOWER EXTREMITY, BILATERAL: ICD-10-CM

## 2021-05-28 PROCEDURE — 93970 EXTREMITY STUDY: CPT | Mod: 26

## 2021-07-09 ENCOUNTER — OUTPATIENT (OUTPATIENT)
Dept: OUTPATIENT SERVICES | Facility: HOSPITAL | Age: 81
LOS: 1 days | Discharge: HOME | End: 2021-07-09
Payer: MEDICARE

## 2021-07-09 DIAGNOSIS — Z79.899 OTHER LONG TERM (CURRENT) DRUG THERAPY: ICD-10-CM

## 2021-07-09 PROCEDURE — 94060 EVALUATION OF WHEEZING: CPT | Mod: 26

## 2021-07-09 PROCEDURE — 94727 GAS DIL/WSHOT DETER LNG VOL: CPT | Mod: 26

## 2021-07-21 ENCOUNTER — APPOINTMENT (OUTPATIENT)
Dept: UROLOGY | Facility: CLINIC | Age: 81
End: 2021-07-21
Payer: MEDICARE

## 2021-07-21 VITALS — BODY MASS INDEX: 25.05 KG/M2 | HEIGHT: 70 IN | WEIGHT: 175 LBS

## 2021-07-21 PROCEDURE — 99213 OFFICE O/P EST LOW 20 MIN: CPT

## 2021-07-21 NOTE — HISTORY OF PRESENT ILLNESS
[FreeTextEntry1] : 81-year-old with BPH, lower urinary tract symptoms, on tamsulosin 0.4 daily, finasteride 5 mg daily, and VESIcare 10 mg daily. While on this regimen he has good urinary stream, nocturia x0-1, no frequency urgency, no hematuria, no dysuria

## 2021-07-21 NOTE — REVIEW OF SYSTEMS
[Feeling Poorly] : not feeling poorly [Discharge From Eyes] : no purulent discharge from the eyes [Nasal Discharge] : no nasal discharge [Chest Pain] : no chest pain [Cough] : no cough [Constipation] : no constipation [Dysuria] : no dysuria

## 2021-07-21 NOTE — ASSESSMENT
[FreeTextEntry1] : Stable BPH and well-controlled urinary symptoms. Continue tamsulosin, finasteride and VESIcare daily [Urinary Symptom or Sign (788.99\R39.89)] : implantation

## 2022-02-01 ENCOUNTER — TRANSCRIPTION ENCOUNTER (OUTPATIENT)
Age: 82
End: 2022-02-01

## 2022-07-11 ENCOUNTER — OUTPATIENT (OUTPATIENT)
Dept: OUTPATIENT SERVICES | Facility: HOSPITAL | Age: 82
LOS: 1 days | Discharge: HOME | End: 2022-07-11
Payer: MEDICARE

## 2022-07-11 DIAGNOSIS — Z79.899 OTHER LONG TERM (CURRENT) DRUG THERAPY: ICD-10-CM

## 2022-07-11 PROCEDURE — 94060 EVALUATION OF WHEEZING: CPT | Mod: 26

## 2022-07-11 PROCEDURE — 94726 PLETHYSMOGRAPHY LUNG VOLUMES: CPT | Mod: 26

## 2022-07-11 PROCEDURE — 94729 DIFFUSING CAPACITY: CPT | Mod: 26

## 2022-07-22 ENCOUNTER — APPOINTMENT (OUTPATIENT)
Dept: UROLOGY | Facility: CLINIC | Age: 82
End: 2022-07-22

## 2022-07-22 PROCEDURE — 99213 OFFICE O/P EST LOW 20 MIN: CPT

## 2022-07-22 NOTE — PHYSICAL EXAM
[General Appearance - In No Acute Distress] : no acute distress [Prostate Tenderness] : the prostate was not tender [No Prostate Nodules] : no prostate nodules [Prostate Size ___ (0-4)] : prostate size [unfilled] (scale: 0-4) [] : no respiratory distress [Oriented To Time, Place, And Person] : oriented to person, place, and time [FreeTextEntry1] : Uses a cane

## 2022-07-22 NOTE — HISTORY OF PRESENT ILLNESS
[FreeTextEntry1] : 82-year-old with BPH lower urinary tract symptoms on tamsulosin 0.4 daily, finasteride 5 mg daily, and Vesicare 10 mg daily.  While on his regimen he has good urinary stream, nocturia x0\par , No frequency urgency.  His symptoms are stable.

## 2022-07-22 NOTE — REVIEW OF SYSTEMS
[Fever] : no fever [Sore Throat] : no sore throat [Shortness Of Breath] : no shortness of breath [Cough] : no cough [Constipation] : no constipation [Diarrhea] : no diarrhea

## 2022-07-22 NOTE — ASSESSMENT
[FreeTextEntry1] : Stable BPH.  Continue tamsulosin 0.4 daily, finasteride 5 mg daily, Vesicare 10 mg daily [Urinary Symptom or Sign (788.99\R39.89)] : implantation

## 2023-07-25 ENCOUNTER — APPOINTMENT (OUTPATIENT)
Dept: UROLOGY | Facility: CLINIC | Age: 83
End: 2023-07-25
Payer: MEDICARE

## 2023-07-25 VITALS
TEMPERATURE: 97.6 F | SYSTOLIC BLOOD PRESSURE: 148 MMHG | RESPIRATION RATE: 16 BRPM | WEIGHT: 175 LBS | BODY MASS INDEX: 25.05 KG/M2 | HEART RATE: 61 BPM | DIASTOLIC BLOOD PRESSURE: 62 MMHG | HEIGHT: 70 IN | OXYGEN SATURATION: 98 %

## 2023-07-25 DIAGNOSIS — N40.1 BENIGN PROSTATIC HYPERPLASIA WITH LOWER URINARY TRACT SYMPMS: ICD-10-CM

## 2023-07-25 DIAGNOSIS — R39.9 UNSPECIFIED SYMPTOMS AND SIGNS INVOLVING THE GENITOURINARY SYSTEM: ICD-10-CM

## 2023-07-25 DIAGNOSIS — N13.8 BENIGN PROSTATIC HYPERPLASIA WITH LOWER URINARY TRACT SYMPMS: ICD-10-CM

## 2023-07-25 PROCEDURE — 99213 OFFICE O/P EST LOW 20 MIN: CPT

## 2023-07-25 NOTE — ASSESSMENT
[FreeTextEntry1] : Stable BPH and lower urinary tract symptoms.  Discussed with family member  current guidelines regarding prostate cancer screening [Urinary Symptom or Sign (788.99\R39.89)] : implantation

## 2023-07-25 NOTE — HISTORY OF PRESENT ILLNESS
[FreeTextEntry1] : 83-year-old with BPH, lower urinary tract symptoms, dementia continuing on tamsulosin 0.4 daily, finasteride 5 mg daily and Solifenacin 10 mg daily.  While on this he has variable urinary stream but is not bothered by it

## 2024-01-10 ENCOUNTER — RX RENEWAL (OUTPATIENT)
Age: 84
End: 2024-01-10

## 2024-01-10 RX ORDER — SOLIFENACIN SUCCINATE 10 MG/1
10 TABLET ORAL
Qty: 90 | Refills: 3 | Status: ACTIVE | COMMUNITY
Start: 2023-08-16 | End: 1900-01-01

## 2024-04-04 ENCOUNTER — APPOINTMENT (OUTPATIENT)
Dept: NEUROLOGY | Facility: CLINIC | Age: 84
End: 2024-04-04
Payer: MEDICARE

## 2024-04-04 VITALS
HEIGHT: 70 IN | BODY MASS INDEX: 24.62 KG/M2 | HEART RATE: 58 BPM | WEIGHT: 172 LBS | OXYGEN SATURATION: 98 % | DIASTOLIC BLOOD PRESSURE: 68 MMHG | RESPIRATION RATE: 16 BRPM | SYSTOLIC BLOOD PRESSURE: 148 MMHG

## 2024-04-04 DIAGNOSIS — G62.9 POLYNEUROPATHY, UNSPECIFIED: ICD-10-CM

## 2024-04-04 DIAGNOSIS — Z86.79 PERSONAL HISTORY OF OTHER DISEASES OF THE CIRCULATORY SYSTEM: ICD-10-CM

## 2024-04-04 DIAGNOSIS — I10 ESSENTIAL (PRIMARY) HYPERTENSION: ICD-10-CM

## 2024-04-04 DIAGNOSIS — G24.3 SPASMODIC TORTICOLLIS: ICD-10-CM

## 2024-04-04 DIAGNOSIS — R41.0 DISORIENTATION, UNSPECIFIED: ICD-10-CM

## 2024-04-04 DIAGNOSIS — G30.1 ALZHEIMER'S DISEASE WITH LATE ONSET: ICD-10-CM

## 2024-04-04 DIAGNOSIS — Z82.49 FAMILY HISTORY OF ISCHEMIC HEART DISEASE AND OTHER DISEASES OF THE CIRCULATORY SYSTEM: ICD-10-CM

## 2024-04-04 DIAGNOSIS — Z86.718 PERSONAL HISTORY OF OTHER VENOUS THROMBOSIS AND EMBOLISM: ICD-10-CM

## 2024-04-04 DIAGNOSIS — H91.90 UNSPECIFIED HEARING LOSS, UNSPECIFIED EAR: ICD-10-CM
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
## 2024-04-04 DIAGNOSIS — F41.9 ANXIETY DISORDER, UNSPECIFIED: ICD-10-CM

## 2024-04-04 DIAGNOSIS — F02.80 ALZHEIMER'S DISEASE WITH LATE ONSET: ICD-10-CM

## 2024-04-04 DIAGNOSIS — G47.30 SLEEP APNEA, UNSPECIFIED: ICD-10-CM

## 2024-04-04 DIAGNOSIS — Z86.69 PERSONAL HISTORY OF OTHER DISEASES OF THE NERVOUS SYSTEM AND SENSE ORGANS: ICD-10-CM

## 2024-04-04 DIAGNOSIS — R44.1 VISUAL HALLUCINATIONS: ICD-10-CM

## 2024-04-04 PROCEDURE — 99204 OFFICE O/P NEW MOD 45 MIN: CPT

## 2024-04-04 PROCEDURE — G2211 COMPLEX E/M VISIT ADD ON: CPT

## 2024-04-04 RX ORDER — MEMANTINE HYDROCHLORIDE 7-14-21-28
7 & 14 & 21 &28 KIT ORAL DAILY
Qty: 1 | Refills: 0 | Status: DISCONTINUED | COMMUNITY
Start: 2020-07-22 | End: 2024-04-04

## 2024-04-04 RX ORDER — QUETIAPINE FUMARATE 50 MG/1
50 TABLET ORAL
Refills: 0 | Status: ACTIVE | COMMUNITY

## 2024-04-04 RX ORDER — MEMANTINE HYDROCHLORIDE 28 MG/1
28 CAPSULE, EXTENDED RELEASE ORAL
Qty: 30 | Refills: 5 | Status: DISCONTINUED | COMMUNITY
Start: 2020-07-22 | End: 2024-04-04

## 2024-04-04 RX ORDER — RIVAROXABAN 20 MG/1
20 TABLET, FILM COATED ORAL
Refills: 0 | Status: ACTIVE | COMMUNITY

## 2024-04-04 RX ORDER — HYDROCHLOROTHIAZIDE 12.5 MG/1
12.5 CAPSULE ORAL
Qty: 45 | Refills: 0 | Status: DISCONTINUED | COMMUNITY
Start: 2018-01-05 | End: 2024-04-04

## 2024-04-04 RX ORDER — SOLIFENACIN SUCCINATE 10 MG/1
TABLET ORAL
Refills: 0 | Status: DISCONTINUED | COMMUNITY
End: 2024-04-04

## 2024-04-04 RX ORDER — SPIRONOLACTONE 25 MG/1
25 TABLET ORAL DAILY
Refills: 0 | Status: ACTIVE | COMMUNITY

## 2024-04-04 RX ORDER — OMEPRAZOLE 20 MG/1
20 CAPSULE, DELAYED RELEASE ORAL
Qty: 30 | Refills: 0 | Status: DISCONTINUED | COMMUNITY
Start: 2017-12-18 | End: 2024-04-04

## 2024-04-04 RX ORDER — SOLIFENACIN SUCCINATE 5 MG/1
5 TABLET, FILM COATED ORAL
Refills: 0 | Status: DISCONTINUED | COMMUNITY
End: 2024-04-04

## 2024-04-04 RX ORDER — RIVAROXABAN 2.5 MG/1
TABLET, FILM COATED ORAL
Refills: 0 | Status: DISCONTINUED | COMMUNITY
End: 2024-04-04

## 2024-04-04 RX ORDER — SERTRALINE HYDROCHLORIDE 50 MG/1
50 TABLET, FILM COATED ORAL
Refills: 0 | Status: ACTIVE | COMMUNITY

## 2024-04-04 RX ORDER — AMIODARONE HYDROCHLORIDE 100 MG/1
100 TABLET ORAL DAILY
Refills: 0 | Status: ACTIVE | COMMUNITY

## 2024-04-04 NOTE — DISCUSSION/SUMMARY
[FreeTextEntry1] : He has multiple areas of cognitive functioning with impairment in today's exam. He also has cervical dystonia, for which he is asymptomatic. Mobility is impaired by polyneuropathy. Can not rule out central origin for now. Will start neurological work up by brain MRI and EEG study. Continue sertraline and quetiapine for now.

## 2024-04-04 NOTE — PHYSICAL EXAM
[General Appearance - Alert] : alert [General Appearance - In No Acute Distress] : in no acute distress [General Appearance - Well Nourished] : well nourished [Oriented to Person] : oriented to person [Oriented to Place] : disoriented to place [Oriented to Time] : disoriented to time [Person] : oriented to person [Place] : disoriented to place [Time] : disoriented to time [Short Term Intact] : short term memory impaired [Remote Intact] : remote memory impaired [Registration Intact] : recent registration memory impaired [Span Intact] : the attention span was decreased [Concentration Intact] : a decrease in concentrating ability was observed [Visual Intact] : visual attention was ~T not ~L decreased [Naming Objects] : difficulty naming common objects [Repeating Phrases] : no difficulty repeating a phrase [Writing A Sentence] : difficulty writing a sentence [Fluency] : fluency not intact [Comprehension] : comprehension intact [Reading] : difficulty reading [Current Events] : inadequate knowledge of current events [Past History] : inadequate knowledge of personal past history [Vocabulary] : inadequate range of vocabulary [Cranial Nerves Optic (II)] : visual acuity intact bilaterally,  visual fields full to confrontation, pupils equal round and reactive to light [Cranial Nerves Oculomotor (III)] : extraocular motion intact [Cranial Nerves Trigeminal (V)] : facial sensation intact symmetrically [Cranial Nerves Facial (VII)] : face symmetrical [Cranial Nerves Glossopharyngeal (IX)] : tongue and palate midline [Cranial Nerves Accessory (XI - Cranial And Spinal)] : head turning and shoulder shrug symmetric [Cranial Nerves Hypoglossal (XII)] : there was no tongue deviation with protrusion [Involuntary Movements] : no involuntary movements were seen [No Muscle Atrophy] : normal bulk in all four extremities [Motor Handedness Right-Handed] : the patient is right hand dominant [Sensation Tactile Decrease] : light touch was intact [Romberg's Sign] : a positive Romberg's sign was present [Limited Balance] : the patient's balance was impaired [Past-pointing] : there was no past-pointing [Tremor] : no tremor present [Dysdiadochokinesia Bilaterally] : not present [Coordination - Dysmetria Impaired Finger-to-Nose Bilateral] : not present [Coordination - Dysmetria Impaired Heel-to-Shin Bilateral] : not present [3+] : Brachioradialis left 3+ [2+] : Patella left 2+ [0] : Ankle jerk left 0 [Plantar Reflex Right Only] : normal on the right [Plantar Reflex Left Only] : normal on the left [___] : absent on the right [___] : absent on the left [Primitive Reflexes] : primitive reflexes were present [Snout Present] : snout reflex present [Primitive Reflexes Palmomental] : palmomental reflex present bilaterally [Glabellar Reflex] : the glabellar reflex was present [FreeTextEntry6] : No limitation of upper and lower extremity usage. Not able to obtain full muscle strength grading [FreeTextEntry7] : Not able to test beyond light touch [No Spinal Tenderness] : no spinal tenderness [FreeTextEntry1] : medium based gait, small steps, some degree of instability

## 2024-04-04 NOTE — HISTORY OF PRESENT ILLNESS
[FreeTextEntry1] : He was seen by Dr. Juan in 2020 and reported memory difficulty for about 2 years at that time. He was instructed to have brain MRI and start Namenda. However, he did not do the recommended test and treatment.  Over time, he gets worse and especially the last 6 months. He also developed visual hallucinations, anxiety and insomnia. Dr. Minor, PMD started him on quetiapine. Wife also signed him up for [facility] day program three times a week. Quality of living has remarkably improved since then.  He still has restless night at times. He still talks to people in the content of his visual hallucinations, mostly family members already passed on. He mispronounces words often. He does not always follow instruction. He does not answer questions being asked. He does purposeless movements with his hands at times. He stares a lot. Sometimes, she does not even recognize his wife.  Although he wears depends. Wife does not feel that he is incontinent. He walks with cane because of right knee pain. He had small falls lately without injury. He denies any pain today. Wife states that he feels dizzy occasionally. He denies visual, auditory, motor or sensory complaints. He has hearing aid, but not using.  Recent lab done on 10/4/24, which shows mild anemia, normal B12 level. He has ongoing follow up by hematologist.

## 2024-04-18 ENCOUNTER — APPOINTMENT (OUTPATIENT)
Dept: NEUROLOGY | Facility: CLINIC | Age: 84
End: 2024-04-18
Payer: MEDICARE

## 2024-04-18 PROCEDURE — 95816 EEG AWAKE AND DROWSY: CPT

## 2024-04-20 ENCOUNTER — NON-APPOINTMENT (OUTPATIENT)
Age: 84
End: 2024-04-20

## 2024-06-24 ENCOUNTER — INPATIENT (INPATIENT)
Facility: HOSPITAL | Age: 84
LOS: 5 days | Discharge: HOME | DRG: 312 | End: 2024-06-30
Attending: STUDENT IN AN ORGANIZED HEALTH CARE EDUCATION/TRAINING PROGRAM | Admitting: STUDENT IN AN ORGANIZED HEALTH CARE EDUCATION/TRAINING PROGRAM
Payer: MEDICARE

## 2024-06-24 VITALS
SYSTOLIC BLOOD PRESSURE: 171 MMHG | DIASTOLIC BLOOD PRESSURE: 74 MMHG | TEMPERATURE: 98 F | OXYGEN SATURATION: 99 % | RESPIRATION RATE: 18 BRPM | HEART RATE: 61 BPM
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
## 2024-06-24 LAB
ALBUMIN SERPL ELPH-MCNC: 4 G/DL — SIGNIFICANT CHANGE UP (ref 3.5–5.2)
ALP SERPL-CCNC: 68 U/L — SIGNIFICANT CHANGE UP (ref 30–115)
ALT FLD-CCNC: 12 U/L — SIGNIFICANT CHANGE UP (ref 0–41)
ANION GAP SERPL CALC-SCNC: 12 MMOL/L — SIGNIFICANT CHANGE UP (ref 7–14)
APTT BLD: 38.8 SEC — SIGNIFICANT CHANGE UP (ref 27–39.2)
AST SERPL-CCNC: 21 U/L — SIGNIFICANT CHANGE UP (ref 0–41)
BASOPHILS # BLD AUTO: 0.05 K/UL — SIGNIFICANT CHANGE UP (ref 0–0.2)
BASOPHILS NFR BLD AUTO: 0.8 % — SIGNIFICANT CHANGE UP (ref 0–1)
BILIRUB SERPL-MCNC: 0.8 MG/DL — SIGNIFICANT CHANGE UP (ref 0.2–1.2)
BUN SERPL-MCNC: 35 MG/DL — HIGH (ref 10–20)
CALCIUM SERPL-MCNC: 10 MG/DL — SIGNIFICANT CHANGE UP (ref 8.4–10.4)
CHLORIDE SERPL-SCNC: 100 MMOL/L — SIGNIFICANT CHANGE UP (ref 98–110)
CK SERPL-CCNC: 120 U/L — SIGNIFICANT CHANGE UP (ref 0–225)
CO2 SERPL-SCNC: 27 MMOL/L — SIGNIFICANT CHANGE UP (ref 17–32)
CREAT SERPL-MCNC: 1.6 MG/DL — HIGH (ref 0.7–1.5)
EGFR: 42 ML/MIN/1.73M2 — LOW
EOSINOPHIL # BLD AUTO: 0.16 K/UL — SIGNIFICANT CHANGE UP (ref 0–0.7)
EOSINOPHIL NFR BLD AUTO: 2.5 % — SIGNIFICANT CHANGE UP (ref 0–8)
ETHANOL SERPL-MCNC: <10 MG/DL — SIGNIFICANT CHANGE UP
GLUCOSE SERPL-MCNC: 88 MG/DL — SIGNIFICANT CHANGE UP (ref 70–99)
HCT VFR BLD CALC: 32.4 % — LOW (ref 42–52)
HGB BLD-MCNC: 11.1 G/DL — LOW (ref 14–18)
IMM GRANULOCYTES NFR BLD AUTO: 0.6 % — HIGH (ref 0.1–0.3)
INR BLD: 1.8 RATIO — HIGH (ref 0.65–1.3)
LACTATE SERPL-SCNC: 1.1 MMOL/L — SIGNIFICANT CHANGE UP (ref 0.7–2)
LIDOCAIN IGE QN: 25 U/L — SIGNIFICANT CHANGE UP (ref 7–60)
LYMPHOCYTES # BLD AUTO: 2.05 K/UL — SIGNIFICANT CHANGE UP (ref 1.2–3.4)
LYMPHOCYTES # BLD AUTO: 31.5 % — SIGNIFICANT CHANGE UP (ref 20.5–51.1)
MCHC RBC-ENTMCNC: 32.1 PG — HIGH (ref 27–31)
MCHC RBC-ENTMCNC: 34.3 G/DL — SIGNIFICANT CHANGE UP (ref 32–37)
MCV RBC AUTO: 93.6 FL — SIGNIFICANT CHANGE UP (ref 80–94)
MONOCYTES # BLD AUTO: 0.8 K/UL — HIGH (ref 0.1–0.6)
MONOCYTES NFR BLD AUTO: 12.3 % — HIGH (ref 1.7–9.3)
NEUTROPHILS # BLD AUTO: 3.41 K/UL — SIGNIFICANT CHANGE UP (ref 1.4–6.5)
NEUTROPHILS NFR BLD AUTO: 52.3 % — SIGNIFICANT CHANGE UP (ref 42.2–75.2)
NRBC # BLD: 0 /100 WBCS — SIGNIFICANT CHANGE UP (ref 0–0)
PLATELET # BLD AUTO: 171 K/UL — SIGNIFICANT CHANGE UP (ref 130–400)
PMV BLD: 11.4 FL — HIGH (ref 7.4–10.4)
POTASSIUM SERPL-MCNC: 4.6 MMOL/L — SIGNIFICANT CHANGE UP (ref 3.5–5)
POTASSIUM SERPL-SCNC: 4.6 MMOL/L — SIGNIFICANT CHANGE UP (ref 3.5–5)
PROT SERPL-MCNC: 6.6 G/DL — SIGNIFICANT CHANGE UP (ref 6–8)
PROTHROM AB SERPL-ACNC: 20.6 SEC — HIGH (ref 9.95–12.87)
RBC # BLD: 3.46 M/UL — LOW (ref 4.7–6.1)
RBC # FLD: 14 % — SIGNIFICANT CHANGE UP (ref 11.5–14.5)
SODIUM SERPL-SCNC: 139 MMOL/L — SIGNIFICANT CHANGE UP (ref 135–146)
TROPONIN T, HIGH SENSITIVITY RESULT: 31 NG/L — HIGH (ref 6–21)
WBC # BLD: 6.51 K/UL — SIGNIFICANT CHANGE UP (ref 4.8–10.8)
WBC # FLD AUTO: 6.51 K/UL — SIGNIFICANT CHANGE UP (ref 4.8–10.8)

## 2024-06-24 PROCEDURE — 82962 GLUCOSE BLOOD TEST: CPT

## 2024-06-24 PROCEDURE — 93306 TTE W/DOPPLER COMPLETE: CPT

## 2024-06-24 PROCEDURE — 99223 1ST HOSP IP/OBS HIGH 75: CPT

## 2024-06-24 PROCEDURE — 80061 LIPID PANEL: CPT

## 2024-06-24 PROCEDURE — 70450 CT HEAD/BRAIN W/O DYE: CPT | Mod: 26,MC

## 2024-06-24 PROCEDURE — 80048 BASIC METABOLIC PNL TOTAL CA: CPT

## 2024-06-24 PROCEDURE — 83036 HEMOGLOBIN GLYCOSYLATED A1C: CPT

## 2024-06-24 PROCEDURE — 97116 GAIT TRAINING THERAPY: CPT | Mod: GP

## 2024-06-24 PROCEDURE — 36415 COLL VENOUS BLD VENIPUNCTURE: CPT

## 2024-06-24 PROCEDURE — 92526 ORAL FUNCTION THERAPY: CPT | Mod: GN

## 2024-06-24 PROCEDURE — 97166 OT EVAL MOD COMPLEX 45 MIN: CPT | Mod: GO

## 2024-06-24 PROCEDURE — 84484 ASSAY OF TROPONIN QUANT: CPT

## 2024-06-24 PROCEDURE — 72170 X-RAY EXAM OF PELVIS: CPT | Mod: 26

## 2024-06-24 PROCEDURE — 72125 CT NECK SPINE W/O DYE: CPT | Mod: 26,MC

## 2024-06-24 PROCEDURE — 97110 THERAPEUTIC EXERCISES: CPT | Mod: GP

## 2024-06-24 PROCEDURE — 92610 EVALUATE SWALLOWING FUNCTION: CPT | Mod: GN

## 2024-06-24 PROCEDURE — 93005 ELECTROCARDIOGRAM TRACING: CPT

## 2024-06-24 PROCEDURE — 85025 COMPLETE CBC W/AUTO DIFF WBC: CPT

## 2024-06-24 PROCEDURE — 85027 COMPLETE CBC AUTOMATED: CPT

## 2024-06-24 PROCEDURE — 83735 ASSAY OF MAGNESIUM: CPT

## 2024-06-24 PROCEDURE — 99285 EMERGENCY DEPT VISIT HI MDM: CPT | Mod: GC

## 2024-06-24 PROCEDURE — 71045 X-RAY EXAM CHEST 1 VIEW: CPT | Mod: 26

## 2024-06-24 PROCEDURE — 74177 CT ABD & PELVIS W/CONTRAST: CPT | Mod: 26,MC

## 2024-06-24 PROCEDURE — 71260 CT THORAX DX C+: CPT | Mod: 26,MC

## 2024-06-24 PROCEDURE — 93010 ELECTROCARDIOGRAM REPORT: CPT

## 2024-06-24 PROCEDURE — 80053 COMPREHEN METABOLIC PANEL: CPT

## 2024-06-24 PROCEDURE — 97162 PT EVAL MOD COMPLEX 30 MIN: CPT | Mod: GP

## 2024-06-24 RX ORDER — MAGNESIUM, ALUMINUM HYDROXIDE 400-400
30 TABLET,CHEWABLE ORAL EVERY 4 HOURS
Refills: 0 | Status: DISCONTINUED | OUTPATIENT
Start: 2024-06-24 | End: 2024-06-30

## 2024-06-24 RX ORDER — ONDANSETRON HYDROCHLORIDE 2 MG/ML
4 INJECTION INTRAMUSCULAR; INTRAVENOUS EVERY 8 HOURS
Refills: 0 | Status: DISCONTINUED | OUTPATIENT
Start: 2024-06-24 | End: 2024-06-30

## 2024-06-24 RX ORDER — ACETAMINOPHEN 325 MG
650 TABLET ORAL EVERY 6 HOURS
Refills: 0 | Status: DISCONTINUED | OUTPATIENT
Start: 2024-06-24 | End: 2024-06-30

## 2024-06-25 ENCOUNTER — TRANSCRIPTION ENCOUNTER (OUTPATIENT)
Age: 84
End: 2024-06-25

## 2024-06-25 LAB
A1C WITH ESTIMATED AVERAGE GLUCOSE RESULT: 6.1 % — HIGH (ref 4–5.6)
ALBUMIN SERPL ELPH-MCNC: 4 G/DL — SIGNIFICANT CHANGE UP (ref 3.5–5.2)
ALP SERPL-CCNC: 60 U/L — SIGNIFICANT CHANGE UP (ref 30–115)
ALT FLD-CCNC: 13 U/L — SIGNIFICANT CHANGE UP (ref 0–41)
ANION GAP SERPL CALC-SCNC: 9 MMOL/L — SIGNIFICANT CHANGE UP (ref 7–14)
AST SERPL-CCNC: 19 U/L — SIGNIFICANT CHANGE UP (ref 0–41)
BASOPHILS # BLD AUTO: 0.04 K/UL — SIGNIFICANT CHANGE UP (ref 0–0.2)
BASOPHILS NFR BLD AUTO: 0.7 % — SIGNIFICANT CHANGE UP (ref 0–1)
BILIRUB SERPL-MCNC: 0.5 MG/DL — SIGNIFICANT CHANGE UP (ref 0.2–1.2)
BUN SERPL-MCNC: 29 MG/DL — HIGH (ref 10–20)
CALCIUM SERPL-MCNC: 9 MG/DL — SIGNIFICANT CHANGE UP (ref 8.4–10.5)
CHLORIDE SERPL-SCNC: 104 MMOL/L — SIGNIFICANT CHANGE UP (ref 98–110)
CHOLEST SERPL-MCNC: 106 MG/DL — SIGNIFICANT CHANGE UP
CO2 SERPL-SCNC: 28 MMOL/L — SIGNIFICANT CHANGE UP (ref 17–32)
CREAT SERPL-MCNC: 1.2 MG/DL — SIGNIFICANT CHANGE UP (ref 0.7–1.5)
EGFR: 60 ML/MIN/1.73M2 — SIGNIFICANT CHANGE UP
EOSINOPHIL # BLD AUTO: 0.24 K/UL — SIGNIFICANT CHANGE UP (ref 0–0.7)
EOSINOPHIL NFR BLD AUTO: 4.4 % — SIGNIFICANT CHANGE UP (ref 0–8)
ESTIMATED AVERAGE GLUCOSE: 128 MG/DL — HIGH (ref 68–114)
GLUCOSE BLDC GLUCOMTR-MCNC: 101 MG/DL — HIGH (ref 70–99)
GLUCOSE SERPL-MCNC: 89 MG/DL — SIGNIFICANT CHANGE UP (ref 70–99)
HCT VFR BLD CALC: 32.5 % — LOW (ref 42–52)
HDLC SERPL-MCNC: 39 MG/DL — LOW
HGB BLD-MCNC: 11.2 G/DL — LOW (ref 14–18)
IMM GRANULOCYTES NFR BLD AUTO: 0.4 % — HIGH (ref 0.1–0.3)
LIPID PNL WITH DIRECT LDL SERPL: 53 MG/DL — SIGNIFICANT CHANGE UP
LYMPHOCYTES # BLD AUTO: 1.33 K/UL — SIGNIFICANT CHANGE UP (ref 1.2–3.4)
LYMPHOCYTES # BLD AUTO: 24.4 % — SIGNIFICANT CHANGE UP (ref 20.5–51.1)
MCHC RBC-ENTMCNC: 32.6 PG — HIGH (ref 27–31)
MCHC RBC-ENTMCNC: 34.5 G/DL — SIGNIFICANT CHANGE UP (ref 32–37)
MCV RBC AUTO: 94.5 FL — HIGH (ref 80–94)
MONOCYTES # BLD AUTO: 0.76 K/UL — HIGH (ref 0.1–0.6)
MONOCYTES NFR BLD AUTO: 13.9 % — HIGH (ref 1.7–9.3)
NEUTROPHILS # BLD AUTO: 3.06 K/UL — SIGNIFICANT CHANGE UP (ref 1.4–6.5)
NEUTROPHILS NFR BLD AUTO: 56.2 % — SIGNIFICANT CHANGE UP (ref 42.2–75.2)
NON HDL CHOLESTEROL: 67 MG/DL — SIGNIFICANT CHANGE UP
NRBC # BLD: 0 /100 WBCS — SIGNIFICANT CHANGE UP (ref 0–0)
PLATELET # BLD AUTO: 165 K/UL — SIGNIFICANT CHANGE UP (ref 130–400)
PMV BLD: 11.4 FL — HIGH (ref 7.4–10.4)
POTASSIUM SERPL-MCNC: 4.1 MMOL/L — SIGNIFICANT CHANGE UP (ref 3.5–5)
POTASSIUM SERPL-SCNC: 4.1 MMOL/L — SIGNIFICANT CHANGE UP (ref 3.5–5)
PROT SERPL-MCNC: 6.5 G/DL — SIGNIFICANT CHANGE UP (ref 6–8)
RBC # BLD: 3.44 M/UL — LOW (ref 4.7–6.1)
RBC # FLD: 13.8 % — SIGNIFICANT CHANGE UP (ref 11.5–14.5)
SODIUM SERPL-SCNC: 141 MMOL/L — SIGNIFICANT CHANGE UP (ref 135–146)
TRIGL SERPL-MCNC: 69 MG/DL — SIGNIFICANT CHANGE UP
TROPONIN T, HIGH SENSITIVITY RESULT: 26 NG/L — HIGH (ref 6–21)
WBC # BLD: 5.45 K/UL — SIGNIFICANT CHANGE UP (ref 4.8–10.8)
WBC # FLD AUTO: 5.45 K/UL — SIGNIFICANT CHANGE UP (ref 4.8–10.8)

## 2024-06-25 PROCEDURE — 99223 1ST HOSP IP/OBS HIGH 75: CPT

## 2024-06-25 PROCEDURE — 93010 ELECTROCARDIOGRAM REPORT: CPT

## 2024-06-25 RX ORDER — RISPERIDONE 0.5 MG/1
0.5 TABLET ORAL DAILY
Refills: 0 | Status: DISCONTINUED | OUTPATIENT
Start: 2024-06-25 | End: 2024-06-30

## 2024-06-25 RX ORDER — RIVAROXABAN 10 MG/1
1 TABLET, FILM COATED ORAL
Refills: 0 | DISCHARGE

## 2024-06-25 RX ORDER — TAMSULOSIN HYDROCHLORIDE 0.4 MG/1
1 CAPSULE ORAL
Refills: 0 | DISCHARGE

## 2024-06-25 RX ORDER — SODIUM CHLORIDE 0.9 % (FLUSH) 0.9 %
1000 SYRINGE (ML) INJECTION
Refills: 0 | Status: DISCONTINUED | OUTPATIENT
Start: 2024-06-25 | End: 2024-06-28

## 2024-06-25 RX ORDER — FUROSEMIDE 10 MG/ML
20 INJECTION, SOLUTION INTRAMUSCULAR; INTRAVENOUS DAILY
Refills: 0 | Status: DISCONTINUED | OUTPATIENT
Start: 2024-06-25 | End: 2024-06-25

## 2024-06-25 RX ORDER — RIVAROXABAN 10 MG/1
15 TABLET, FILM COATED ORAL
Refills: 0 | Status: DISCONTINUED | OUTPATIENT
Start: 2024-06-25 | End: 2024-06-28

## 2024-06-25 RX ORDER — OLANZAPINE 2.5 MG/1
2.5 TABLET, FILM COATED ORAL ONCE
Refills: 0 | Status: COMPLETED | OUTPATIENT
Start: 2024-06-25 | End: 2024-06-25

## 2024-06-25 RX ORDER — PANTOPRAZOLE SODIUM 40 MG/10ML
40 INJECTION, POWDER, FOR SOLUTION INTRAVENOUS
Refills: 0 | Status: DISCONTINUED | OUTPATIENT
Start: 2024-06-25 | End: 2024-06-30

## 2024-06-25 RX ORDER — LOSARTAN POTASSIUM 100 MG/1
25 TABLET, FILM COATED ORAL DAILY
Refills: 0 | Status: DISCONTINUED | OUTPATIENT
Start: 2024-06-25 | End: 2024-06-26

## 2024-06-25 RX ORDER — DEXTROSE MONOHYDRATE AND SODIUM CHLORIDE 5; .3 G/100ML; G/100ML
250 INJECTION, SOLUTION INTRAVENOUS ONCE
Refills: 0 | Status: COMPLETED | OUTPATIENT
Start: 2024-06-25 | End: 2024-06-25

## 2024-06-25 RX ORDER — ATORVASTATIN CALCIUM 20 MG/1
1 TABLET, FILM COATED ORAL
Refills: 0 | DISCHARGE

## 2024-06-25 RX ORDER — ATORVASTATIN CALCIUM 20 MG/1
10 TABLET, FILM COATED ORAL AT BEDTIME
Refills: 0 | Status: DISCONTINUED | OUTPATIENT
Start: 2024-06-25 | End: 2024-06-30

## 2024-06-25 RX ORDER — SERTRALINE HYDROCHLORIDE 100 MG/1
1 TABLET, FILM COATED ORAL
Refills: 0 | DISCHARGE

## 2024-06-25 RX ORDER — HALOPERIDOL DECANOATE 100 MG/ML
5 VIAL (ML) INTRAMUSCULAR ONCE
Refills: 0 | Status: COMPLETED | OUTPATIENT
Start: 2024-06-25 | End: 2024-06-25

## 2024-06-25 RX ORDER — SPIRONOLACTONE 25 MG/1
25 TABLET ORAL DAILY
Refills: 0 | Status: DISCONTINUED | OUTPATIENT
Start: 2024-06-25 | End: 2024-06-26

## 2024-06-25 RX ORDER — AMIODARONE HYDROCHLORIDE 50 MG/ML
1 INJECTION, SOLUTION INTRAVENOUS
Refills: 0 | DISCHARGE

## 2024-06-25 RX ORDER — AMIODARONE HYDROCHLORIDE 50 MG/ML
100 INJECTION, SOLUTION INTRAVENOUS
Refills: 0 | Status: DISCONTINUED | OUTPATIENT
Start: 2024-06-25 | End: 2024-06-30

## 2024-06-25 RX ORDER — SODIUM CHLORIDE 0.9 % (FLUSH) 0.9 %
1000 SYRINGE (ML) INJECTION ONCE
Refills: 0 | Status: DISCONTINUED | OUTPATIENT
Start: 2024-06-25 | End: 2024-06-25

## 2024-06-25 RX ORDER — SOLIFENACIN SUCCINATE 10 MG/1
1 TABLET, FILM COATED ORAL
Refills: 0 | DISCHARGE

## 2024-06-25 RX ORDER — RISPERIDONE 0.5 MG/1
1 TABLET ORAL
Refills: 0 | DISCHARGE

## 2024-06-25 RX ORDER — SERTRALINE HYDROCHLORIDE 100 MG/1
50 TABLET, FILM COATED ORAL DAILY
Refills: 0 | Status: DISCONTINUED | OUTPATIENT
Start: 2024-06-25 | End: 2024-06-30

## 2024-06-25 RX ADMIN — Medication 5 MILLIGRAM(S): at 12:32

## 2024-06-25 RX ADMIN — Medication 75 MILLILITER(S): at 17:43

## 2024-06-25 RX ADMIN — DEXTROSE MONOHYDRATE AND SODIUM CHLORIDE 250 MILLILITER(S): 5; .3 INJECTION, SOLUTION INTRAVENOUS at 14:45

## 2024-06-25 RX ADMIN — ATORVASTATIN CALCIUM 10 MILLIGRAM(S): 20 TABLET, FILM COATED ORAL at 22:20

## 2024-06-25 RX ADMIN — Medication 3 MILLIGRAM(S): at 22:39

## 2024-06-25 RX ADMIN — AMIODARONE HYDROCHLORIDE 100 MILLIGRAM(S): 50 INJECTION, SOLUTION INTRAVENOUS at 09:45

## 2024-06-25 RX ADMIN — PANTOPRAZOLE SODIUM 40 MILLIGRAM(S): 40 INJECTION, POWDER, FOR SOLUTION INTRAVENOUS at 06:29

## 2024-06-25 RX ADMIN — RISPERIDONE 0.5 MILLIGRAM(S): 0.5 TABLET ORAL at 12:33

## 2024-06-25 RX ADMIN — Medication 100 MILLIGRAM(S): at 22:40

## 2024-06-25 RX ADMIN — SERTRALINE HYDROCHLORIDE 50 MILLIGRAM(S): 100 TABLET, FILM COATED ORAL at 12:32

## 2024-06-25 RX ADMIN — LOSARTAN POTASSIUM 25 MILLIGRAM(S): 100 TABLET, FILM COATED ORAL at 06:28

## 2024-06-25 RX ADMIN — Medication 75 MILLILITER(S): at 23:56

## 2024-06-25 RX ADMIN — SPIRONOLACTONE 25 MILLIGRAM(S): 25 TABLET ORAL at 06:28

## 2024-06-25 RX ADMIN — RIVAROXABAN 15 MILLIGRAM(S): 10 TABLET, FILM COATED ORAL at 17:42

## 2024-06-25 RX ADMIN — OLANZAPINE 2.5 MILLIGRAM(S): 2.5 TABLET, FILM COATED ORAL at 03:13

## 2024-06-26 ENCOUNTER — RESULT REVIEW (OUTPATIENT)
Age: 84
End: 2024-06-26

## 2024-06-26 LAB
ALBUMIN SERPL ELPH-MCNC: 3.8 G/DL — SIGNIFICANT CHANGE UP (ref 3.5–5.2)
ALP SERPL-CCNC: 58 U/L — SIGNIFICANT CHANGE UP (ref 30–115)
ALT FLD-CCNC: 12 U/L — SIGNIFICANT CHANGE UP (ref 0–41)
ANION GAP SERPL CALC-SCNC: 11 MMOL/L — SIGNIFICANT CHANGE UP (ref 7–14)
AST SERPL-CCNC: 22 U/L — SIGNIFICANT CHANGE UP (ref 0–41)
BILIRUB SERPL-MCNC: 0.4 MG/DL — SIGNIFICANT CHANGE UP (ref 0.2–1.2)
BUN SERPL-MCNC: 24 MG/DL — HIGH (ref 10–20)
CALCIUM SERPL-MCNC: 8.9 MG/DL — SIGNIFICANT CHANGE UP (ref 8.4–10.5)
CHLORIDE SERPL-SCNC: 106 MMOL/L — SIGNIFICANT CHANGE UP (ref 98–110)
CO2 SERPL-SCNC: 23 MMOL/L — SIGNIFICANT CHANGE UP (ref 17–32)
CREAT SERPL-MCNC: 1 MG/DL — SIGNIFICANT CHANGE UP (ref 0.7–1.5)
EGFR: 74 ML/MIN/1.73M2 — SIGNIFICANT CHANGE UP
GLUCOSE SERPL-MCNC: 88 MG/DL — SIGNIFICANT CHANGE UP (ref 70–99)
HCT VFR BLD CALC: 36.6 % — LOW (ref 42–52)
HGB BLD-MCNC: 12.4 G/DL — LOW (ref 14–18)
MCHC RBC-ENTMCNC: 32.2 PG — HIGH (ref 27–31)
MCHC RBC-ENTMCNC: 33.9 G/DL — SIGNIFICANT CHANGE UP (ref 32–37)
MCV RBC AUTO: 95.1 FL — HIGH (ref 80–94)
NRBC # BLD: 0 /100 WBCS — SIGNIFICANT CHANGE UP (ref 0–0)
PLATELET # BLD AUTO: 172 K/UL — SIGNIFICANT CHANGE UP (ref 130–400)
PMV BLD: 11.4 FL — HIGH (ref 7.4–10.4)
POTASSIUM SERPL-MCNC: 4.3 MMOL/L — SIGNIFICANT CHANGE UP (ref 3.5–5)
POTASSIUM SERPL-SCNC: 4.3 MMOL/L — SIGNIFICANT CHANGE UP (ref 3.5–5)
PROT SERPL-MCNC: 6.3 G/DL — SIGNIFICANT CHANGE UP (ref 6–8)
RBC # BLD: 3.85 M/UL — LOW (ref 4.7–6.1)
RBC # FLD: 13.9 % — SIGNIFICANT CHANGE UP (ref 11.5–14.5)
SODIUM SERPL-SCNC: 140 MMOL/L — SIGNIFICANT CHANGE UP (ref 135–146)
WBC # BLD: 9.73 K/UL — SIGNIFICANT CHANGE UP (ref 4.8–10.8)
WBC # FLD AUTO: 9.73 K/UL — SIGNIFICANT CHANGE UP (ref 4.8–10.8)

## 2024-06-26 PROCEDURE — 93306 TTE W/DOPPLER COMPLETE: CPT | Mod: 26

## 2024-06-26 PROCEDURE — 99232 SBSQ HOSP IP/OBS MODERATE 35: CPT

## 2024-06-26 RX ORDER — LOSARTAN POTASSIUM 100 MG/1
25 TABLET, FILM COATED ORAL ONCE
Refills: 0 | Status: COMPLETED | OUTPATIENT
Start: 2024-06-26 | End: 2024-06-26

## 2024-06-26 RX ORDER — LORAZEPAM 0.5 MG
2 TABLET ORAL ONCE
Refills: 0 | Status: DISCONTINUED | OUTPATIENT
Start: 2024-06-26 | End: 2024-06-27

## 2024-06-26 RX ORDER — LOSARTAN POTASSIUM 100 MG/1
50 TABLET, FILM COATED ORAL DAILY
Refills: 0 | Status: DISCONTINUED | OUTPATIENT
Start: 2024-06-27 | End: 2024-06-30

## 2024-06-26 RX ADMIN — SPIRONOLACTONE 25 MILLIGRAM(S): 25 TABLET ORAL at 05:22

## 2024-06-26 RX ADMIN — RIVAROXABAN 15 MILLIGRAM(S): 10 TABLET, FILM COATED ORAL at 17:26

## 2024-06-26 RX ADMIN — AMIODARONE HYDROCHLORIDE 100 MILLIGRAM(S): 50 INJECTION, SOLUTION INTRAVENOUS at 08:09

## 2024-06-26 RX ADMIN — LOSARTAN POTASSIUM 25 MILLIGRAM(S): 100 TABLET, FILM COATED ORAL at 05:22

## 2024-06-26 RX ADMIN — PANTOPRAZOLE SODIUM 40 MILLIGRAM(S): 40 INJECTION, POWDER, FOR SOLUTION INTRAVENOUS at 05:22

## 2024-06-26 RX ADMIN — Medication 5 MILLIGRAM(S): at 21:19

## 2024-06-26 RX ADMIN — Medication 100 MILLIGRAM(S): at 21:19

## 2024-06-26 RX ADMIN — ATORVASTATIN CALCIUM 10 MILLIGRAM(S): 20 TABLET, FILM COATED ORAL at 21:19

## 2024-06-27 LAB
ANION GAP SERPL CALC-SCNC: 10 MMOL/L — SIGNIFICANT CHANGE UP (ref 7–14)
BASOPHILS # BLD AUTO: 0.04 K/UL — SIGNIFICANT CHANGE UP (ref 0–0.2)
BASOPHILS NFR BLD AUTO: 0.4 % — SIGNIFICANT CHANGE UP (ref 0–1)
BUN SERPL-MCNC: 18 MG/DL — SIGNIFICANT CHANGE UP (ref 10–20)
CALCIUM SERPL-MCNC: 8.8 MG/DL — SIGNIFICANT CHANGE UP (ref 8.4–10.4)
CHLORIDE SERPL-SCNC: 105 MMOL/L — SIGNIFICANT CHANGE UP (ref 98–110)
CO2 SERPL-SCNC: 24 MMOL/L — SIGNIFICANT CHANGE UP (ref 17–32)
CREAT SERPL-MCNC: 1 MG/DL — SIGNIFICANT CHANGE UP (ref 0.7–1.5)
EGFR: 74 ML/MIN/1.73M2 — SIGNIFICANT CHANGE UP
EOSINOPHIL # BLD AUTO: 0.19 K/UL — SIGNIFICANT CHANGE UP (ref 0–0.7)
EOSINOPHIL NFR BLD AUTO: 2 % — SIGNIFICANT CHANGE UP (ref 0–8)
GLUCOSE SERPL-MCNC: 80 MG/DL — SIGNIFICANT CHANGE UP (ref 70–99)
HCT VFR BLD CALC: 33.4 % — LOW (ref 42–52)
HGB BLD-MCNC: 11 G/DL — LOW (ref 14–18)
IMM GRANULOCYTES NFR BLD AUTO: 0.3 % — SIGNIFICANT CHANGE UP (ref 0.1–0.3)
LYMPHOCYTES # BLD AUTO: 1.81 K/UL — SIGNIFICANT CHANGE UP (ref 1.2–3.4)
LYMPHOCYTES # BLD AUTO: 18.6 % — LOW (ref 20.5–51.1)
MAGNESIUM SERPL-MCNC: 2.3 MG/DL — SIGNIFICANT CHANGE UP (ref 1.8–2.4)
MCHC RBC-ENTMCNC: 32.1 PG — HIGH (ref 27–31)
MCHC RBC-ENTMCNC: 32.9 G/DL — SIGNIFICANT CHANGE UP (ref 32–37)
MCV RBC AUTO: 97.4 FL — HIGH (ref 80–94)
MONOCYTES # BLD AUTO: 1.12 K/UL — HIGH (ref 0.1–0.6)
MONOCYTES NFR BLD AUTO: 11.5 % — HIGH (ref 1.7–9.3)
NEUTROPHILS # BLD AUTO: 6.55 K/UL — HIGH (ref 1.4–6.5)
NEUTROPHILS NFR BLD AUTO: 67.2 % — SIGNIFICANT CHANGE UP (ref 42.2–75.2)
NRBC # BLD: 0 /100 WBCS — SIGNIFICANT CHANGE UP (ref 0–0)
PLATELET # BLD AUTO: 141 K/UL — SIGNIFICANT CHANGE UP (ref 130–400)
PMV BLD: 11.5 FL — HIGH (ref 7.4–10.4)
POTASSIUM SERPL-MCNC: 3.9 MMOL/L — SIGNIFICANT CHANGE UP (ref 3.5–5)
POTASSIUM SERPL-SCNC: 3.9 MMOL/L — SIGNIFICANT CHANGE UP (ref 3.5–5)
RBC # BLD: 3.43 M/UL — LOW (ref 4.7–6.1)
RBC # FLD: 14.2 % — SIGNIFICANT CHANGE UP (ref 11.5–14.5)
SODIUM SERPL-SCNC: 139 MMOL/L — SIGNIFICANT CHANGE UP (ref 135–146)
WBC # BLD: 9.74 K/UL — SIGNIFICANT CHANGE UP (ref 4.8–10.8)
WBC # FLD AUTO: 9.74 K/UL — SIGNIFICANT CHANGE UP (ref 4.8–10.8)

## 2024-06-27 PROCEDURE — 99232 SBSQ HOSP IP/OBS MODERATE 35: CPT

## 2024-06-27 RX ORDER — LORAZEPAM 0.5 MG
2 TABLET ORAL ONCE
Refills: 0 | Status: DISCONTINUED | OUTPATIENT
Start: 2024-06-27 | End: 2024-06-27

## 2024-06-27 RX ADMIN — Medication 5 MILLIGRAM(S): at 21:13

## 2024-06-27 RX ADMIN — RISPERIDONE 0.5 MILLIGRAM(S): 0.5 TABLET ORAL at 13:27

## 2024-06-27 RX ADMIN — SERTRALINE HYDROCHLORIDE 50 MILLIGRAM(S): 100 TABLET, FILM COATED ORAL at 13:26

## 2024-06-27 RX ADMIN — Medication 100 MILLIGRAM(S): at 21:13

## 2024-06-27 RX ADMIN — Medication 2 MILLIGRAM(S): at 23:30

## 2024-06-27 RX ADMIN — Medication 5 MILLIGRAM(S): at 13:26

## 2024-06-27 RX ADMIN — RIVAROXABAN 15 MILLIGRAM(S): 10 TABLET, FILM COATED ORAL at 18:14

## 2024-06-27 RX ADMIN — ATORVASTATIN CALCIUM 10 MILLIGRAM(S): 20 TABLET, FILM COATED ORAL at 21:13

## 2024-06-28 LAB
ANION GAP SERPL CALC-SCNC: 12 MMOL/L — SIGNIFICANT CHANGE UP (ref 7–14)
BASOPHILS # BLD AUTO: 0.04 K/UL — SIGNIFICANT CHANGE UP (ref 0–0.2)
BASOPHILS NFR BLD AUTO: 0.5 % — SIGNIFICANT CHANGE UP (ref 0–1)
BUN SERPL-MCNC: 15 MG/DL — SIGNIFICANT CHANGE UP (ref 10–20)
CALCIUM SERPL-MCNC: 8.8 MG/DL — SIGNIFICANT CHANGE UP (ref 8.4–10.4)
CHLORIDE SERPL-SCNC: 106 MMOL/L — SIGNIFICANT CHANGE UP (ref 98–110)
CO2 SERPL-SCNC: 23 MMOL/L — SIGNIFICANT CHANGE UP (ref 17–32)
CREAT SERPL-MCNC: 0.8 MG/DL — SIGNIFICANT CHANGE UP (ref 0.7–1.5)
EGFR: 87 ML/MIN/1.73M2 — SIGNIFICANT CHANGE UP
EOSINOPHIL # BLD AUTO: 0.12 K/UL — SIGNIFICANT CHANGE UP (ref 0–0.7)
EOSINOPHIL NFR BLD AUTO: 1.4 % — SIGNIFICANT CHANGE UP (ref 0–8)
GLUCOSE SERPL-MCNC: 108 MG/DL — HIGH (ref 70–99)
HCT VFR BLD CALC: 33.7 % — LOW (ref 42–52)
HGB BLD-MCNC: 11.1 G/DL — LOW (ref 14–18)
IMM GRANULOCYTES NFR BLD AUTO: 1.2 % — HIGH (ref 0.1–0.3)
LYMPHOCYTES # BLD AUTO: 1 K/UL — LOW (ref 1.2–3.4)
LYMPHOCYTES # BLD AUTO: 11.5 % — LOW (ref 20.5–51.1)
MCHC RBC-ENTMCNC: 32.2 PG — HIGH (ref 27–31)
MCHC RBC-ENTMCNC: 32.9 G/DL — SIGNIFICANT CHANGE UP (ref 32–37)
MCV RBC AUTO: 97.7 FL — HIGH (ref 80–94)
MONOCYTES # BLD AUTO: 0.85 K/UL — HIGH (ref 0.1–0.6)
MONOCYTES NFR BLD AUTO: 9.8 % — HIGH (ref 1.7–9.3)
NEUTROPHILS # BLD AUTO: 6.58 K/UL — HIGH (ref 1.4–6.5)
NEUTROPHILS NFR BLD AUTO: 75.6 % — HIGH (ref 42.2–75.2)
NRBC # BLD: 0 /100 WBCS — SIGNIFICANT CHANGE UP (ref 0–0)
PLATELET # BLD AUTO: 148 K/UL — SIGNIFICANT CHANGE UP (ref 130–400)
PMV BLD: 11.4 FL — HIGH (ref 7.4–10.4)
POTASSIUM SERPL-MCNC: 4.2 MMOL/L — SIGNIFICANT CHANGE UP (ref 3.5–5)
POTASSIUM SERPL-SCNC: 4.2 MMOL/L — SIGNIFICANT CHANGE UP (ref 3.5–5)
RBC # BLD: 3.45 M/UL — LOW (ref 4.7–6.1)
RBC # FLD: 13.9 % — SIGNIFICANT CHANGE UP (ref 11.5–14.5)
SODIUM SERPL-SCNC: 141 MMOL/L — SIGNIFICANT CHANGE UP (ref 135–146)
WBC # BLD: 8.69 K/UL — SIGNIFICANT CHANGE UP (ref 4.8–10.8)
WBC # FLD AUTO: 8.69 K/UL — SIGNIFICANT CHANGE UP (ref 4.8–10.8)

## 2024-06-28 PROCEDURE — 99232 SBSQ HOSP IP/OBS MODERATE 35: CPT | Mod: GC

## 2024-06-28 RX ORDER — RIVAROXABAN 10 MG/1
20 TABLET, FILM COATED ORAL
Refills: 0 | Status: DISCONTINUED | OUTPATIENT
Start: 2024-06-28 | End: 2024-06-30

## 2024-06-28 RX ORDER — HYDRALAZINE HYDROCHLORIDE 50 MG/1
5 TABLET ORAL ONCE
Refills: 0 | Status: DISCONTINUED | OUTPATIENT
Start: 2024-06-28 | End: 2024-06-28

## 2024-06-28 RX ADMIN — ATORVASTATIN CALCIUM 10 MILLIGRAM(S): 20 TABLET, FILM COATED ORAL at 21:07

## 2024-06-28 RX ADMIN — RIVAROXABAN 20 MILLIGRAM(S): 10 TABLET, FILM COATED ORAL at 19:00

## 2024-06-28 RX ADMIN — LOSARTAN POTASSIUM 50 MILLIGRAM(S): 100 TABLET, FILM COATED ORAL at 05:27

## 2024-06-28 RX ADMIN — Medication 5 MILLIGRAM(S): at 21:07

## 2024-06-28 RX ADMIN — Medication 100 MILLIGRAM(S): at 21:08

## 2024-06-28 RX ADMIN — AMIODARONE HYDROCHLORIDE 100 MILLIGRAM(S): 50 INJECTION, SOLUTION INTRAVENOUS at 09:34

## 2024-06-28 RX ADMIN — PANTOPRAZOLE SODIUM 40 MILLIGRAM(S): 40 INJECTION, POWDER, FOR SOLUTION INTRAVENOUS at 06:11

## 2024-06-29 ENCOUNTER — TRANSCRIPTION ENCOUNTER (OUTPATIENT)
Age: 84
End: 2024-06-29

## 2024-06-29 PROCEDURE — 99232 SBSQ HOSP IP/OBS MODERATE 35: CPT

## 2024-06-29 RX ORDER — LOSARTAN POTASSIUM 100 MG/1
1 TABLET, FILM COATED ORAL
Refills: 0 | DISCHARGE

## 2024-06-29 RX ORDER — LOSARTAN POTASSIUM 100 MG/1
1 TABLET, FILM COATED ORAL
Qty: 0 | Refills: 0 | DISCHARGE
Start: 2024-06-29

## 2024-06-29 RX ORDER — NYSTATIN 100000/G
1 POWDER (GRAM) TOPICAL ONCE
Refills: 0 | Status: COMPLETED | OUTPATIENT
Start: 2024-06-29 | End: 2024-06-30

## 2024-06-29 RX ORDER — SPIRONOLACTONE 25 MG/1
1 TABLET ORAL
Refills: 0 | DISCHARGE

## 2024-06-29 RX ORDER — FUROSEMIDE 10 MG/ML
1 INJECTION, SOLUTION INTRAMUSCULAR; INTRAVENOUS
Refills: 0 | DISCHARGE

## 2024-06-29 RX ORDER — FUROSEMIDE 10 MG/ML
20 INJECTION, SOLUTION INTRAMUSCULAR; INTRAVENOUS DAILY
Refills: 0 | Status: DISCONTINUED | OUTPATIENT
Start: 2024-06-29 | End: 2024-06-30

## 2024-06-29 RX ORDER — SPIRONOLACTONE 25 MG/1
25 TABLET ORAL DAILY
Refills: 0 | Status: DISCONTINUED | OUTPATIENT
Start: 2024-06-29 | End: 2024-06-30

## 2024-06-29 RX ADMIN — PANTOPRAZOLE SODIUM 40 MILLIGRAM(S): 40 INJECTION, POWDER, FOR SOLUTION INTRAVENOUS at 06:42

## 2024-06-29 RX ADMIN — ATORVASTATIN CALCIUM 10 MILLIGRAM(S): 20 TABLET, FILM COATED ORAL at 21:19

## 2024-06-29 RX ADMIN — FUROSEMIDE 20 MILLIGRAM(S): 10 INJECTION, SOLUTION INTRAMUSCULAR; INTRAVENOUS at 14:06

## 2024-06-29 RX ADMIN — LOSARTAN POTASSIUM 50 MILLIGRAM(S): 100 TABLET, FILM COATED ORAL at 06:42

## 2024-06-29 RX ADMIN — AMIODARONE HYDROCHLORIDE 100 MILLIGRAM(S): 50 INJECTION, SOLUTION INTRAVENOUS at 07:48

## 2024-06-29 RX ADMIN — Medication 5 MILLIGRAM(S): at 21:19

## 2024-06-29 RX ADMIN — Medication 5 MILLIGRAM(S): at 11:43

## 2024-06-29 RX ADMIN — Medication 100 MILLIGRAM(S): at 21:19

## 2024-06-29 RX ADMIN — RISPERIDONE 0.5 MILLIGRAM(S): 0.5 TABLET ORAL at 11:43

## 2024-06-29 RX ADMIN — RIVAROXABAN 20 MILLIGRAM(S): 10 TABLET, FILM COATED ORAL at 17:13

## 2024-06-29 RX ADMIN — SPIRONOLACTONE 25 MILLIGRAM(S): 25 TABLET ORAL at 14:06

## 2024-06-29 RX ADMIN — SERTRALINE HYDROCHLORIDE 50 MILLIGRAM(S): 100 TABLET, FILM COATED ORAL at 11:43

## 2024-06-30 VITALS
DIASTOLIC BLOOD PRESSURE: 85 MMHG | TEMPERATURE: 98 F | WEIGHT: 150.36 LBS | SYSTOLIC BLOOD PRESSURE: 156 MMHG | RESPIRATION RATE: 18 BRPM | HEART RATE: 52 BPM

## 2024-06-30 DIAGNOSIS — Z95.1 PRESENCE OF AORTOCORONARY BYPASS GRAFT: Chronic | ICD-10-CM

## 2024-06-30 LAB
ANION GAP SERPL CALC-SCNC: 15 MMOL/L — HIGH (ref 7–14)
BASOPHILS # BLD AUTO: 0.06 K/UL — SIGNIFICANT CHANGE UP (ref 0–0.2)
BASOPHILS NFR BLD AUTO: 0.9 % — SIGNIFICANT CHANGE UP (ref 0–1)
BUN SERPL-MCNC: 15 MG/DL — SIGNIFICANT CHANGE UP (ref 10–20)
CALCIUM SERPL-MCNC: 8.7 MG/DL — SIGNIFICANT CHANGE UP (ref 8.4–10.5)
CHLORIDE SERPL-SCNC: 106 MMOL/L — SIGNIFICANT CHANGE UP (ref 98–110)
CO2 SERPL-SCNC: 21 MMOL/L — SIGNIFICANT CHANGE UP (ref 17–32)
CREAT SERPL-MCNC: 0.9 MG/DL — SIGNIFICANT CHANGE UP (ref 0.7–1.5)
EGFR: 84 ML/MIN/1.73M2 — SIGNIFICANT CHANGE UP
EOSINOPHIL # BLD AUTO: 0.38 K/UL — SIGNIFICANT CHANGE UP (ref 0–0.7)
EOSINOPHIL NFR BLD AUTO: 5.5 % — SIGNIFICANT CHANGE UP (ref 0–8)
GLUCOSE SERPL-MCNC: 84 MG/DL — SIGNIFICANT CHANGE UP (ref 70–99)
HCT VFR BLD CALC: 33.7 % — LOW (ref 42–52)
HGB BLD-MCNC: 11.6 G/DL — LOW (ref 14–18)
IMM GRANULOCYTES NFR BLD AUTO: 0.4 % — HIGH (ref 0.1–0.3)
LYMPHOCYTES # BLD AUTO: 1.5 K/UL — SIGNIFICANT CHANGE UP (ref 1.2–3.4)
LYMPHOCYTES # BLD AUTO: 21.5 % — SIGNIFICANT CHANGE UP (ref 20.5–51.1)
MAGNESIUM SERPL-MCNC: 1.8 MG/DL — SIGNIFICANT CHANGE UP (ref 1.8–2.4)
MCHC RBC-ENTMCNC: 32.4 PG — HIGH (ref 27–31)
MCHC RBC-ENTMCNC: 34.4 G/DL — SIGNIFICANT CHANGE UP (ref 32–37)
MCV RBC AUTO: 94.1 FL — HIGH (ref 80–94)
MONOCYTES # BLD AUTO: 1.04 K/UL — HIGH (ref 0.1–0.6)
MONOCYTES NFR BLD AUTO: 14.9 % — HIGH (ref 1.7–9.3)
NEUTROPHILS # BLD AUTO: 3.96 K/UL — SIGNIFICANT CHANGE UP (ref 1.4–6.5)
NEUTROPHILS NFR BLD AUTO: 56.8 % — SIGNIFICANT CHANGE UP (ref 42.2–75.2)
NRBC # BLD: 0 /100 WBCS — SIGNIFICANT CHANGE UP (ref 0–0)
PLATELET # BLD AUTO: 185 K/UL — SIGNIFICANT CHANGE UP (ref 130–400)
PMV BLD: 11.6 FL — HIGH (ref 7.4–10.4)
POTASSIUM SERPL-MCNC: 3.9 MMOL/L — SIGNIFICANT CHANGE UP (ref 3.5–5)
POTASSIUM SERPL-SCNC: 3.9 MMOL/L — SIGNIFICANT CHANGE UP (ref 3.5–5)
RBC # BLD: 3.58 M/UL — LOW (ref 4.7–6.1)
RBC # FLD: 13.6 % — SIGNIFICANT CHANGE UP (ref 11.5–14.5)
SODIUM SERPL-SCNC: 142 MMOL/L — SIGNIFICANT CHANGE UP (ref 135–146)
WBC # BLD: 6.97 K/UL — SIGNIFICANT CHANGE UP (ref 4.8–10.8)
WBC # FLD AUTO: 6.97 K/UL — SIGNIFICANT CHANGE UP (ref 4.8–10.8)

## 2024-06-30 PROCEDURE — 99239 HOSP IP/OBS DSCHRG MGMT >30: CPT

## 2024-06-30 RX ORDER — FUROSEMIDE 10 MG/ML
20 INJECTION, SOLUTION INTRAMUSCULAR; INTRAVENOUS ONCE
Refills: 0 | Status: COMPLETED | OUTPATIENT
Start: 2024-06-30 | End: 2024-06-30

## 2024-06-30 RX ORDER — FUROSEMIDE 10 MG/ML
1 INJECTION, SOLUTION INTRAMUSCULAR; INTRAVENOUS
Qty: 0 | Refills: 0 | DISCHARGE
Start: 2024-06-30

## 2024-06-30 RX ORDER — FUROSEMIDE 10 MG/ML
40 INJECTION, SOLUTION INTRAMUSCULAR; INTRAVENOUS DAILY
Refills: 0 | Status: DISCONTINUED | OUTPATIENT
Start: 2024-07-01 | End: 2024-06-30

## 2024-06-30 RX ORDER — SPIRONOLACTONE 25 MG/1
1 TABLET ORAL
Qty: 30 | Refills: 0
Start: 2024-06-30 | End: 2024-07-29

## 2024-06-30 RX ORDER — LOSARTAN POTASSIUM 100 MG/1
1 TABLET, FILM COATED ORAL
Refills: 0
Start: 2024-06-30

## 2024-06-30 RX ADMIN — Medication 1 APPLICATION(S): at 05:50

## 2024-06-30 RX ADMIN — Medication 5 MILLIGRAM(S): at 12:59

## 2024-06-30 RX ADMIN — LOSARTAN POTASSIUM 50 MILLIGRAM(S): 100 TABLET, FILM COATED ORAL at 05:47

## 2024-06-30 RX ADMIN — AMIODARONE HYDROCHLORIDE 100 MILLIGRAM(S): 50 INJECTION, SOLUTION INTRAVENOUS at 08:32

## 2024-06-30 RX ADMIN — RISPERIDONE 0.5 MILLIGRAM(S): 0.5 TABLET ORAL at 13:00

## 2024-06-30 RX ADMIN — FUROSEMIDE 20 MILLIGRAM(S): 10 INJECTION, SOLUTION INTRAMUSCULAR; INTRAVENOUS at 12:59

## 2024-06-30 RX ADMIN — RIVAROXABAN 20 MILLIGRAM(S): 10 TABLET, FILM COATED ORAL at 15:25

## 2024-06-30 RX ADMIN — SPIRONOLACTONE 25 MILLIGRAM(S): 25 TABLET ORAL at 05:47

## 2024-06-30 RX ADMIN — SERTRALINE HYDROCHLORIDE 50 MILLIGRAM(S): 100 TABLET, FILM COATED ORAL at 13:00

## 2024-06-30 RX ADMIN — Medication 12.5 MILLIGRAM(S): at 00:04

## 2024-06-30 RX ADMIN — FUROSEMIDE 20 MILLIGRAM(S): 10 INJECTION, SOLUTION INTRAMUSCULAR; INTRAVENOUS at 05:46

## 2024-06-30 RX ADMIN — PANTOPRAZOLE SODIUM 40 MILLIGRAM(S): 40 INJECTION, POWDER, FOR SOLUTION INTRAVENOUS at 05:47

## 2024-07-02 ENCOUNTER — EMERGENCY (EMERGENCY)
Facility: HOSPITAL | Age: 84
LOS: 0 days | Discharge: HOME | End: 2024-07-02
Attending: EMERGENCY MEDICINE
Payer: MEDICARE

## 2024-07-02 VITALS
TEMPERATURE: 98 F | HEIGHT: 70 IN | OXYGEN SATURATION: 98 % | SYSTOLIC BLOOD PRESSURE: 152 MMHG | WEIGHT: 149.91 LBS | DIASTOLIC BLOOD PRESSURE: 79 MMHG | RESPIRATION RATE: 18 BRPM | HEART RATE: 75 BPM

## 2024-07-02 DIAGNOSIS — B36.9 SUPERFICIAL MYCOSIS, UNSPECIFIED: ICD-10-CM

## 2024-07-02 DIAGNOSIS — Z95.1 PRESENCE OF AORTOCORONARY BYPASS GRAFT: Chronic | ICD-10-CM

## 2024-07-02 DIAGNOSIS — R21 RASH AND OTHER NONSPECIFIC SKIN ERUPTION: ICD-10-CM

## 2024-07-02 DIAGNOSIS — Z79.01 LONG TERM (CURRENT) USE OF ANTICOAGULANTS: ICD-10-CM

## 2024-07-02 DIAGNOSIS — I50.9 HEART FAILURE, UNSPECIFIED: ICD-10-CM

## 2024-07-02 DIAGNOSIS — I25.10 ATHEROSCLEROTIC HEART DISEASE OF NATIVE CORONARY ARTERY WITHOUT ANGINA PECTORIS: ICD-10-CM

## 2024-07-02 DIAGNOSIS — I11.0 HYPERTENSIVE HEART DISEASE WITH HEART FAILURE: ICD-10-CM

## 2024-07-02 DIAGNOSIS — I48.91 UNSPECIFIED ATRIAL FIBRILLATION: ICD-10-CM

## 2024-07-02 DIAGNOSIS — E78.5 HYPERLIPIDEMIA, UNSPECIFIED: ICD-10-CM

## 2024-07-02 DIAGNOSIS — L53.9 ERYTHEMATOUS CONDITION, UNSPECIFIED: ICD-10-CM

## 2024-07-02 DIAGNOSIS — Z95.2 PRESENCE OF PROSTHETIC HEART VALVE: ICD-10-CM

## 2024-07-02 PROBLEM — I10 ESSENTIAL (PRIMARY) HYPERTENSION: Chronic | Status: ACTIVE | Noted: 2024-06-30

## 2024-07-02 PROCEDURE — 99283 EMERGENCY DEPT VISIT LOW MDM: CPT | Mod: FS

## 2024-07-02 PROCEDURE — 99283 EMERGENCY DEPT VISIT LOW MDM: CPT

## 2024-07-02 RX ORDER — CLOTRIMAZOLE 1 %
1 CREAM (GRAM) TOPICAL
Qty: 1 | Refills: 0
Start: 2024-07-02 | End: 2024-07-15

## 2024-07-08 DIAGNOSIS — I95.1 ORTHOSTATIC HYPOTENSION: ICD-10-CM

## 2024-07-08 DIAGNOSIS — G30.9 ALZHEIMER'S DISEASE, UNSPECIFIED: ICD-10-CM

## 2024-07-08 DIAGNOSIS — I50.9 HEART FAILURE, UNSPECIFIED: ICD-10-CM

## 2024-07-08 DIAGNOSIS — S09.90XA UNSPECIFIED INJURY OF HEAD, INITIAL ENCOUNTER: ICD-10-CM

## 2024-07-08 DIAGNOSIS — D68.9 COAGULATION DEFECT, UNSPECIFIED: ICD-10-CM

## 2024-07-08 DIAGNOSIS — I25.10 ATHEROSCLEROTIC HEART DISEASE OF NATIVE CORONARY ARTERY WITHOUT ANGINA PECTORIS: ICD-10-CM

## 2024-07-08 DIAGNOSIS — I48.0 PAROXYSMAL ATRIAL FIBRILLATION: ICD-10-CM

## 2024-07-08 DIAGNOSIS — Z79.01 LONG TERM (CURRENT) USE OF ANTICOAGULANTS: ICD-10-CM

## 2024-07-08 DIAGNOSIS — F02.82 DEMENTIA IN OTHER DISEASES CLASSIFIED ELSEWHERE, UNSPECIFIED SEVERITY, WITH PSYCHOTIC DISTURBANCE: ICD-10-CM

## 2024-07-08 DIAGNOSIS — I11.0 HYPERTENSIVE HEART DISEASE WITH HEART FAILURE: ICD-10-CM

## 2024-07-08 DIAGNOSIS — Z95.1 PRESENCE OF AORTOCORONARY BYPASS GRAFT: ICD-10-CM

## 2024-07-30 ENCOUNTER — APPOINTMENT (OUTPATIENT)
Dept: UROLOGY | Facility: CLINIC | Age: 84
End: 2024-07-30

## 2024-07-31 ENCOUNTER — APPOINTMENT (OUTPATIENT)
Dept: NEUROLOGY | Facility: CLINIC | Age: 84
End: 2024-07-31

## 2024-08-21 ENCOUNTER — APPOINTMENT (OUTPATIENT)
Dept: PSYCHIATRY | Facility: CLINIC | Age: 84
End: 2024-08-21
Payer: MEDICARE

## 2024-08-21 ENCOUNTER — OUTPATIENT (OUTPATIENT)
Dept: OUTPATIENT SERVICES | Facility: HOSPITAL | Age: 84
LOS: 1 days | End: 2024-08-21
Payer: MEDICARE

## 2024-08-21 DIAGNOSIS — G30.1 ALZHEIMER'S DISEASE WITH LATE ONSET: ICD-10-CM

## 2024-08-21 DIAGNOSIS — Z95.1 PRESENCE OF AORTOCORONARY BYPASS GRAFT: Chronic | ICD-10-CM

## 2024-08-21 DIAGNOSIS — F02.80 ALZHEIMER'S DISEASE WITH LATE ONSET: ICD-10-CM

## 2024-08-21 DIAGNOSIS — F41.9 ANXIETY DISORDER, UNSPECIFIED: ICD-10-CM

## 2024-08-21 DIAGNOSIS — F41.1 GENERALIZED ANXIETY DISORDER: ICD-10-CM

## 2024-08-21 PROCEDURE — 90792 PSYCH DIAG EVAL W/MED SRVCS: CPT

## 2024-08-21 NOTE — SOCIAL HISTORY
[FreeTextEntry1] : He was born in [place], raised in . First marriage-  eleven yrs, Second marriage-  forty six yrs, one son. He has four sisters ([place], [place]) He was army sergeant (three yrs),  [employer] for twenty yrs- retired 1989

## 2024-08-21 NOTE — PSYCHOSOCIAL ASSESSMENT
[All substances negative except as specified below] : All substances negative except as specified below [_____] : Frequency: [unfilled] [No] : Have you ever experienced this type of event? No

## 2024-08-21 NOTE — HISTORY OF PRESENT ILLNESS
[FreeTextEntry1] : Jaquan is a 83yo  male, resides with wife, (son, daughter-in-law, 6yo grandson, 3yo grand-daughter live upstairs)  no past psychiatric history, no inpatient hospitalizations, attempts, PMHx of Alzheimer's dementia, HHA 24hrs.  He is calm, states 'nothing bothers me'.  Majority of the information is obtained from wife, Elizabeth. Patient has exhibited memory difficulty over the past six years, symptoms exacerbated two years ago. He sleeps well, exhibits anxiety as he visualizes and converses with  parents, aunt. He becomes frustrated, asks to return to his childhood home, he does not exhibit physical agitation. He does not experience paranoia or delusions. He has no recent episodes of wandering, needs assistance with ADLs.  He has good appetite. He attends [facility], three times weekly. He is adherent with Quetiapine 100mg at 3pm, Sertraline 50mg po daily, prescribed by pmd, Dr. Germaine Minor. He has history of falls.  She is educated regarding Black box warning of antipsychotics in dementia, option to increase Sertraline, lower Quetiapine is discussed to minimize fall risk, metabolic syndrome. Safety plan is discussed. Total time in session: sixty minutes  [FreeTextEntry2] : There were no other psychotropic medications trialed.

## 2024-08-21 NOTE — REVIEW OF SYSTEMS
[Negative] : Neurological [FreeTextEntry2] : hearing deficit [FreeTextEntry8] : intermittent constipation

## 2024-08-21 NOTE — PAST MEDICAL HISTORY
[FreeTextEntry1] : He follows up with pmd, urologist, cardiologist, hematologist Hx of three episodes of DVT. He was hospitalized at [facility] 6/2024, he slid off the chair, struck back of his head.    Labs 6/30/24: CBC, CMP normal, Hb 11.6, Hct 33.6, gfr 84 EKG 8/13/24:  QTc 510ms, premature pvc,

## 2024-08-21 NOTE — REASON FOR VISIT
[OK  to leave message] : OK  to leave message [Self-Referred] : Self-Referred [Patient] : Patient [Other:___] : [unfilled] [FreeTextEntry1] : [phone]

## 2024-08-21 NOTE — PHYSICAL EXAM
[None] : none [Average] : average [WNL] : within normal limits [Cooperative] : cooperative [Flat] : flat [Underproductive] : underproductive [Poverty of content] : poverty of content [Slowed thinking] : slowed thinking [Hallucinations - Visual] : hallucinations - visual [Memory] : memory [FreeTextEntry2] : uses walker [de-identified] :  parents, aunt [de-identified] : Alert, oriented to self [de-identified] : Limited [de-identified] : Limited

## 2024-08-21 NOTE — PAST MEDICAL HISTORY
[FreeTextEntry1] : He follows up with pmd, urologist, cardiologist, hematologist Hx of three episodes of DVT. He was hospitalized at [facility] 6/2024, he slid off the chair, struck back of his head.    Labs 6/30/24: CBC, CMP normal, Hb 11.6, Hct 33.6, gfr 84 EKG 8/13/24:  QTc 510ms, premature pvc,

## 2024-08-21 NOTE — REASON FOR VISIT
[OK  to leave message] : OK  to leave message [Self-Referred] : Self-Referred [Patient] : Patient [Other:___] : [unfilled] [FreeTextEntry1] : [phone]

## 2024-08-21 NOTE — PHYSICAL EXAM
[None] : none [Average] : average [WNL] : within normal limits [Cooperative] : cooperative [Flat] : flat [Underproductive] : underproductive [Poverty of content] : poverty of content [Slowed thinking] : slowed thinking [Hallucinations - Visual] : hallucinations - visual [Memory] : memory [FreeTextEntry2] : uses walker [de-identified] :  parents, aunt [de-identified] : Alert, oriented to self [de-identified] : Limited [de-identified] : Limited

## 2024-08-21 NOTE — HISTORY OF PRESENT ILLNESS
[FreeTextEntry1] : Jaquan is a 85yo  male, resides with wife, (son, daughter-in-law, 6yo grandson, 3yo grand-daughter live upstairs)  no past psychiatric history, no inpatient hospitalizations, attempts, PMHx of Alzheimer's dementia, HHA 24hrs.  He is calm, states 'nothing bothers me'.  Majority of the information is obtained from wife, Elizabeth. Patient has exhibited memory difficulty over the past six years, symptoms exacerbated two years ago. He sleeps well, exhibits anxiety as he visualizes and converses with  parents, aunt. He becomes frustrated, asks to return to his childhood home, he does not exhibit physical agitation. He does not experience paranoia or delusions. He has no recent episodes of wandering, needs assistance with ADLs.  He has good appetite. He attends [facility], three times weekly. He is adherent with Quetiapine 100mg at 3pm, Sertraline 50mg po daily, prescribed by pmd, Dr. Germaine Minor. He has history of falls.  She is educated regarding Black box warning of antipsychotics in dementia, option to increase Sertraline, lower Quetiapine is discussed to minimize fall risk, metabolic syndrome. Safety plan is discussed. Total time in session: sixty minutes  [FreeTextEntry2] : There were no other psychotropic medications trialed.

## 2024-08-22 DIAGNOSIS — F41.9 ANXIETY DISORDER, UNSPECIFIED: ICD-10-CM

## 2024-08-22 DIAGNOSIS — G30.1 ALZHEIMER'S DISEASE WITH LATE ONSET: ICD-10-CM

## 2024-08-27 RX ORDER — SERTRALINE HYDROCHLORIDE 50 MG/1
50 TABLET, FILM COATED ORAL DAILY
Qty: 30 | Refills: 0 | Status: ACTIVE | COMMUNITY
Start: 2024-08-27 | End: 1900-01-01

## 2024-08-27 RX ORDER — QUETIAPINE FUMARATE 50 MG/1
50 TABLET ORAL AS DIRECTED
Qty: 30 | Refills: 0 | Status: ACTIVE | COMMUNITY
Start: 2024-08-27 | End: 1900-01-01

## 2024-08-27 RX ORDER — SERTRALINE 25 MG/1
25 TABLET, FILM COATED ORAL DAILY
Qty: 30 | Refills: 0 | Status: ACTIVE | COMMUNITY
Start: 2024-08-27 | End: 1900-01-01

## 2024-08-27 RX ORDER — QUETIAPINE FUMARATE 25 MG/1
25 TABLET ORAL
Qty: 30 | Refills: 0 | Status: ACTIVE | COMMUNITY
Start: 2024-08-27 | End: 1900-01-01

## 2024-08-27 NOTE — DISCUSSION/SUMMARY
[Low acute suicide risk] : Low acute suicide risk [FreeTextEntry1] : Family is in agreement to decrease Quetiapine to 75mg, increase Sertraline to 75mg to address verbal outbursts, minimize fall risk, risks, benefits, side effects are discussed.

## 2024-10-16 ENCOUNTER — OUTPATIENT (OUTPATIENT)
Dept: OUTPATIENT SERVICES | Facility: HOSPITAL | Age: 84
LOS: 1 days | End: 2024-10-16
Payer: MEDICARE

## 2024-10-16 ENCOUNTER — APPOINTMENT (OUTPATIENT)
Dept: PSYCHIATRY | Facility: CLINIC | Age: 84
End: 2024-10-16
Payer: MEDICARE

## 2024-10-16 DIAGNOSIS — Z95.1 PRESENCE OF AORTOCORONARY BYPASS GRAFT: Chronic | ICD-10-CM

## 2024-10-16 DIAGNOSIS — F41.9 ANXIETY DISORDER, UNSPECIFIED: ICD-10-CM

## 2024-10-16 DIAGNOSIS — G30.1 ALZHEIMER'S DISEASE WITH LATE ONSET: ICD-10-CM

## 2024-10-16 DIAGNOSIS — F02.80 ALZHEIMER'S DISEASE WITH LATE ONSET: ICD-10-CM

## 2024-10-16 PROCEDURE — 99214 OFFICE O/P EST MOD 30 MIN: CPT

## 2024-10-17 DIAGNOSIS — G30.1 ALZHEIMER'S DISEASE WITH LATE ONSET: ICD-10-CM

## 2024-10-17 DIAGNOSIS — F41.9 ANXIETY DISORDER, UNSPECIFIED: ICD-10-CM

## 2024-12-10 ENCOUNTER — APPOINTMENT (OUTPATIENT)
Dept: PSYCHIATRY | Facility: CLINIC | Age: 84
End: 2024-12-10

## 2024-12-18 ENCOUNTER — APPOINTMENT (OUTPATIENT)
Dept: CARDIOLOGY | Facility: CLINIC | Age: 84
End: 2024-12-18

## 2025-01-15 ENCOUNTER — APPOINTMENT (OUTPATIENT)
Dept: PSYCHIATRY | Facility: CLINIC | Age: 85
End: 2025-01-15
Payer: MEDICARE

## 2025-01-15 ENCOUNTER — OUTPATIENT (OUTPATIENT)
Dept: OUTPATIENT SERVICES | Facility: HOSPITAL | Age: 85
LOS: 1 days | End: 2025-01-15
Payer: MEDICARE

## 2025-01-15 DIAGNOSIS — G30.1 ALZHEIMER'S DISEASE WITH LATE ONSET: ICD-10-CM

## 2025-01-15 DIAGNOSIS — F41.9 ANXIETY DISORDER, UNSPECIFIED: ICD-10-CM

## 2025-01-15 DIAGNOSIS — F02.80 ALZHEIMER'S DISEASE WITH LATE ONSET: ICD-10-CM

## 2025-01-15 DIAGNOSIS — Z95.1 PRESENCE OF AORTOCORONARY BYPASS GRAFT: Chronic | ICD-10-CM

## 2025-01-15 PROCEDURE — 99214 OFFICE O/P EST MOD 30 MIN: CPT

## 2025-01-15 RX ORDER — SERTRALINE 25 MG/1
25 TABLET, FILM COATED ORAL DAILY
Qty: 30 | Refills: 0 | Status: ACTIVE | COMMUNITY
Start: 2025-01-15 | End: 1900-01-01

## 2025-01-16 DIAGNOSIS — G30.1 ALZHEIMER'S DISEASE WITH LATE ONSET: ICD-10-CM

## 2025-02-19 ENCOUNTER — APPOINTMENT (OUTPATIENT)
Dept: PSYCHIATRY | Facility: CLINIC | Age: 85
End: 2025-02-19
Payer: MEDICARE

## 2025-02-19 ENCOUNTER — OUTPATIENT (OUTPATIENT)
Dept: OUTPATIENT SERVICES | Facility: HOSPITAL | Age: 85
LOS: 1 days | End: 2025-02-19
Payer: MEDICARE

## 2025-02-19 DIAGNOSIS — Z95.1 PRESENCE OF AORTOCORONARY BYPASS GRAFT: Chronic | ICD-10-CM

## 2025-02-19 DIAGNOSIS — G30.1 ALZHEIMER'S DISEASE WITH LATE ONSET: ICD-10-CM

## 2025-02-19 DIAGNOSIS — F02.80 ALZHEIMER'S DISEASE WITH LATE ONSET: ICD-10-CM

## 2025-02-19 DIAGNOSIS — F02.80 DEMENTIA IN OTHER DISEASES CLASSIFIED ELSEWHERE, UNSPECIFIED SEVERITY, WITHOUT BEHAVIORAL DISTURBANCE, PSYCHOTIC DISTURBANCE, MOOD DISTURBANCE, AND ANXIETY: ICD-10-CM

## 2025-02-19 PROCEDURE — 99214 OFFICE O/P EST MOD 30 MIN: CPT

## 2025-02-20 DIAGNOSIS — F41.9 ANXIETY DISORDER, UNSPECIFIED: ICD-10-CM

## 2025-02-20 DIAGNOSIS — F02.80 DEMENTIA IN OTHER DISEASES CLASSIFIED ELSEWHERE, UNSPECIFIED SEVERITY, WITHOUT BEHAVIORAL DISTURBANCE, PSYCHOTIC DISTURBANCE, MOOD DISTURBANCE, AND ANXIETY: ICD-10-CM

## 2025-03-19 ENCOUNTER — INPATIENT (INPATIENT)
Facility: HOSPITAL | Age: 85
LOS: 11 days | Discharge: HOME | DRG: 871 | End: 2025-03-31
Attending: STUDENT IN AN ORGANIZED HEALTH CARE EDUCATION/TRAINING PROGRAM | Admitting: FAMILY MEDICINE
Payer: MEDICARE

## 2025-03-19 VITALS
SYSTOLIC BLOOD PRESSURE: 128 MMHG | OXYGEN SATURATION: 97 % | WEIGHT: 162.92 LBS | RESPIRATION RATE: 28 BRPM | TEMPERATURE: 103 F | HEART RATE: 65 BPM | DIASTOLIC BLOOD PRESSURE: 59 MMHG

## 2025-03-19 DIAGNOSIS — Z95.1 PRESENCE OF AORTOCORONARY BYPASS GRAFT: Chronic | ICD-10-CM

## 2025-03-19 DIAGNOSIS — N39.0 URINARY TRACT INFECTION, SITE NOT SPECIFIED: ICD-10-CM

## 2025-03-19 LAB
ALBUMIN SERPL ELPH-MCNC: 3.2 G/DL — LOW (ref 3.5–5.2)
ALP SERPL-CCNC: 121 U/L — HIGH (ref 30–115)
ALT FLD-CCNC: 31 U/L — SIGNIFICANT CHANGE UP (ref 0–41)
ANION GAP SERPL CALC-SCNC: 10 MMOL/L — SIGNIFICANT CHANGE UP (ref 7–14)
APPEARANCE UR: ABNORMAL
APTT BLD: 36.4 SEC — SIGNIFICANT CHANGE UP (ref 27–39.2)
AST SERPL-CCNC: 28 U/L — SIGNIFICANT CHANGE UP (ref 0–41)
BACTERIA # UR AUTO: ABNORMAL /HPF
BASOPHILS # BLD AUTO: 0.04 K/UL — SIGNIFICANT CHANGE UP (ref 0–0.2)
BASOPHILS NFR BLD AUTO: 0.3 % — SIGNIFICANT CHANGE UP (ref 0–1)
BILIRUB SERPL-MCNC: 0.5 MG/DL — SIGNIFICANT CHANGE UP (ref 0.2–1.2)
BILIRUB UR-MCNC: NEGATIVE — SIGNIFICANT CHANGE UP
BUN SERPL-MCNC: 29 MG/DL — HIGH (ref 10–20)
CALCIUM SERPL-MCNC: 8.3 MG/DL — LOW (ref 8.4–10.5)
CHLORIDE SERPL-SCNC: 106 MMOL/L — SIGNIFICANT CHANGE UP (ref 98–110)
CO2 SERPL-SCNC: 27 MMOL/L — SIGNIFICANT CHANGE UP (ref 17–32)
COLOR SPEC: SIGNIFICANT CHANGE UP
CREAT SERPL-MCNC: 1.1 MG/DL — SIGNIFICANT CHANGE UP (ref 0.7–1.5)
DIFF PNL FLD: ABNORMAL
EGFR: 66 ML/MIN/1.73M2 — SIGNIFICANT CHANGE UP
EGFR: 66 ML/MIN/1.73M2 — SIGNIFICANT CHANGE UP
EOSINOPHIL # BLD AUTO: 0 K/UL — SIGNIFICANT CHANGE UP (ref 0–0.7)
EOSINOPHIL NFR BLD AUTO: 0 % — SIGNIFICANT CHANGE UP (ref 0–8)
EPI CELLS # UR: 3 — SIGNIFICANT CHANGE UP
FLUAV AG NPH QL: SIGNIFICANT CHANGE UP
FLUBV AG NPH QL: SIGNIFICANT CHANGE UP
GAS PNL BLDV: SIGNIFICANT CHANGE UP
GLUCOSE SERPL-MCNC: 117 MG/DL — HIGH (ref 70–99)
GLUCOSE UR QL: NEGATIVE MG/DL — SIGNIFICANT CHANGE UP
HCT VFR BLD CALC: 31.5 % — LOW (ref 42–52)
HGB BLD-MCNC: 10.8 G/DL — LOW (ref 14–18)
IMM GRANULOCYTES NFR BLD AUTO: 0.6 % — HIGH (ref 0.1–0.3)
INR BLD: 1.69 RATIO — HIGH (ref 0.65–1.3)
KETONES UR-MCNC: NEGATIVE MG/DL — SIGNIFICANT CHANGE UP
LACTATE SERPL-SCNC: 1 MMOL/L — SIGNIFICANT CHANGE UP (ref 0.7–2)
LACTATE SERPL-SCNC: 2.3 MMOL/L — HIGH (ref 0.7–2)
LEUKOCYTE ESTERASE UR-ACNC: ABNORMAL
LYMPHOCYTES # BLD AUTO: 0.71 K/UL — LOW (ref 1.2–3.4)
LYMPHOCYTES # BLD AUTO: 5.5 % — LOW (ref 20.5–51.1)
MCHC RBC-ENTMCNC: 30.9 PG — SIGNIFICANT CHANGE UP (ref 27–31)
MCHC RBC-ENTMCNC: 34.3 G/DL — SIGNIFICANT CHANGE UP (ref 32–37)
MCV RBC AUTO: 90.3 FL — SIGNIFICANT CHANGE UP (ref 80–94)
MONOCYTES # BLD AUTO: 0.85 K/UL — HIGH (ref 0.1–0.6)
MONOCYTES NFR BLD AUTO: 6.6 % — SIGNIFICANT CHANGE UP (ref 1.7–9.3)
NEUTROPHILS # BLD AUTO: 11.26 K/UL — HIGH (ref 1.4–6.5)
NEUTROPHILS NFR BLD AUTO: 87 % — HIGH (ref 42.2–75.2)
NITRITE UR-MCNC: NEGATIVE — SIGNIFICANT CHANGE UP
NRBC BLD AUTO-RTO: 0 /100 WBCS — SIGNIFICANT CHANGE UP (ref 0–0)
PH UR: 6 — SIGNIFICANT CHANGE UP (ref 5–8)
PLATELET # BLD AUTO: 212 K/UL — SIGNIFICANT CHANGE UP (ref 130–400)
PMV BLD: 11.5 FL — HIGH (ref 7.4–10.4)
POTASSIUM SERPL-MCNC: 4.4 MMOL/L — SIGNIFICANT CHANGE UP (ref 3.5–5)
POTASSIUM SERPL-SCNC: 4.4 MMOL/L — SIGNIFICANT CHANGE UP (ref 3.5–5)
PROT SERPL-MCNC: 6 G/DL — SIGNIFICANT CHANGE UP (ref 6–8)
PROT UR-MCNC: 100 MG/DL
PROTHROM AB SERPL-ACNC: 20.2 SEC — HIGH (ref 9.95–12.87)
RBC # BLD: 3.49 M/UL — LOW (ref 4.7–6.1)
RBC # FLD: 16.1 % — HIGH (ref 11.5–14.5)
RBC CASTS # UR COMP ASSIST: 7 /HPF — HIGH (ref 0–4)
RSV RNA NPH QL NAA+NON-PROBE: SIGNIFICANT CHANGE UP
SARS-COV-2 RNA SPEC QL NAA+PROBE: SIGNIFICANT CHANGE UP
SODIUM SERPL-SCNC: 143 MMOL/L — SIGNIFICANT CHANGE UP (ref 135–146)
SOURCE RESPIRATORY: SIGNIFICANT CHANGE UP
SP GR SPEC: 1.02 — SIGNIFICANT CHANGE UP (ref 1–1.03)
UROBILINOGEN FLD QL: 1 MG/DL — SIGNIFICANT CHANGE UP (ref 0.2–1)
WBC # BLD: 12.94 K/UL — HIGH (ref 4.8–10.8)
WBC # FLD AUTO: 12.94 K/UL — HIGH (ref 4.8–10.8)
WBC UR QL: ABNORMAL /HPF (ref 0–5)

## 2025-03-19 PROCEDURE — 93010 ELECTROCARDIOGRAM REPORT: CPT

## 2025-03-19 PROCEDURE — 87040 BLOOD CULTURE FOR BACTERIA: CPT

## 2025-03-19 PROCEDURE — 73030 X-RAY EXAM OF SHOULDER: CPT | Mod: LT

## 2025-03-19 PROCEDURE — C1751: CPT

## 2025-03-19 PROCEDURE — 36573 INSJ PICC RS&I 5 YR+: CPT | Mod: RT

## 2025-03-19 PROCEDURE — 99497 ADVNCD CARE PLAN 30 MIN: CPT | Mod: 25

## 2025-03-19 PROCEDURE — 93307 TTE W/O DOPPLER COMPLETE: CPT

## 2025-03-19 PROCEDURE — 85025 COMPLETE CBC W/AUTO DIFF WBC: CPT

## 2025-03-19 PROCEDURE — 74177 CT ABD & PELVIS W/CONTRAST: CPT | Mod: 26

## 2025-03-19 PROCEDURE — 92526 ORAL FUNCTION THERAPY: CPT | Mod: GN

## 2025-03-19 PROCEDURE — 93971 EXTREMITY STUDY: CPT | Mod: LT

## 2025-03-19 PROCEDURE — 70450 CT HEAD/BRAIN W/O DYE: CPT | Mod: 26

## 2025-03-19 PROCEDURE — 80053 COMPREHEN METABOLIC PANEL: CPT

## 2025-03-19 PROCEDURE — 97162 PT EVAL MOD COMPLEX 30 MIN: CPT | Mod: GP

## 2025-03-19 PROCEDURE — 85652 RBC SED RATE AUTOMATED: CPT

## 2025-03-19 PROCEDURE — 87077 CULTURE AEROBIC IDENTIFY: CPT

## 2025-03-19 PROCEDURE — 86140 C-REACTIVE PROTEIN: CPT

## 2025-03-19 PROCEDURE — 99285 EMERGENCY DEPT VISIT HI MDM: CPT | Mod: FS

## 2025-03-19 PROCEDURE — 80048 BASIC METABOLIC PNL TOTAL CA: CPT

## 2025-03-19 PROCEDURE — 92610 EVALUATE SWALLOWING FUNCTION: CPT | Mod: GN

## 2025-03-19 PROCEDURE — 75572 CT HRT W/3D IMAGE: CPT | Mod: MC

## 2025-03-19 PROCEDURE — 85027 COMPLETE CBC AUTOMATED: CPT

## 2025-03-19 PROCEDURE — 36415 COLL VENOUS BLD VENIPUNCTURE: CPT

## 2025-03-19 PROCEDURE — 99223 1ST HOSP IP/OBS HIGH 75: CPT | Mod: FS

## 2025-03-19 PROCEDURE — 70450 CT HEAD/BRAIN W/O DYE: CPT | Mod: MC

## 2025-03-19 RX ORDER — ACETAMINOPHEN 500 MG/5ML
650 LIQUID (ML) ORAL ONCE
Refills: 0 | Status: COMPLETED | OUTPATIENT
Start: 2025-03-19 | End: 2025-03-19

## 2025-03-19 RX ORDER — ACETAMINOPHEN 500 MG/5ML
650 LIQUID (ML) ORAL ONCE
Refills: 0 | Status: DISCONTINUED | OUTPATIENT
Start: 2025-03-19 | End: 2025-03-19

## 2025-03-19 RX ORDER — CEFEPIME 2 G/20ML
2000 INJECTION, POWDER, FOR SOLUTION INTRAVENOUS ONCE
Refills: 0 | Status: COMPLETED | OUTPATIENT
Start: 2025-03-19 | End: 2025-03-19

## 2025-03-19 RX ORDER — SODIUM CHLORIDE 9 G/1000ML
2300 INJECTION, SOLUTION INTRAVENOUS ONCE
Refills: 0 | Status: COMPLETED | OUTPATIENT
Start: 2025-03-19 | End: 2025-03-19

## 2025-03-19 RX ORDER — QUETIAPINE FUMARATE 25 MG/1
12.5 TABLET ORAL AT BEDTIME
Refills: 0 | Status: DISCONTINUED | OUTPATIENT
Start: 2025-03-19 | End: 2025-03-31

## 2025-03-19 RX ORDER — TAMSULOSIN HYDROCHLORIDE 0.4 MG/1
0.4 CAPSULE ORAL AT BEDTIME
Refills: 0 | Status: DISCONTINUED | OUTPATIENT
Start: 2025-03-19 | End: 2025-03-31

## 2025-03-19 RX ORDER — FUROSEMIDE 10 MG/ML
1 INJECTION INTRAMUSCULAR; INTRAVENOUS
Refills: 0 | DISCHARGE

## 2025-03-19 RX ORDER — AMIODARONE HYDROCHLORIDE 50 MG/ML
100 INJECTION, SOLUTION INTRAVENOUS
Refills: 0 | Status: DISCONTINUED | OUTPATIENT
Start: 2025-03-19 | End: 2025-03-31

## 2025-03-19 RX ORDER — QUETIAPINE FUMARATE 25 MG/1
0.5 TABLET ORAL
Refills: 0 | DISCHARGE

## 2025-03-19 RX ORDER — ONDANSETRON HCL/PF 4 MG/2 ML
4 VIAL (ML) INJECTION EVERY 6 HOURS
Refills: 0 | Status: DISCONTINUED | OUTPATIENT
Start: 2025-03-19 | End: 2025-03-31

## 2025-03-19 RX ORDER — SENNA 187 MG
2 TABLET ORAL AT BEDTIME
Refills: 0 | Status: DISCONTINUED | OUTPATIENT
Start: 2025-03-19 | End: 2025-03-31

## 2025-03-19 RX ORDER — CEFEPIME 2 G/20ML
2000 INJECTION, POWDER, FOR SOLUTION INTRAVENOUS EVERY 8 HOURS
Refills: 0 | Status: COMPLETED | OUTPATIENT
Start: 2025-03-20 | End: 2025-03-20

## 2025-03-19 RX ORDER — ACETAMINOPHEN 500 MG/5ML
650 LIQUID (ML) ORAL EVERY 6 HOURS
Refills: 0 | Status: DISCONTINUED | OUTPATIENT
Start: 2025-03-19 | End: 2025-03-31

## 2025-03-19 RX ORDER — SERTRALINE 100 MG/1
50 TABLET, FILM COATED ORAL DAILY
Refills: 0 | Status: DISCONTINUED | OUTPATIENT
Start: 2025-03-19 | End: 2025-03-31

## 2025-03-19 RX ORDER — CEFEPIME 2 G/20ML
INJECTION, POWDER, FOR SOLUTION INTRAVENOUS
Refills: 0 | Status: COMPLETED | OUTPATIENT
Start: 2025-03-19 | End: 2025-03-20

## 2025-03-19 RX ORDER — FINASTERIDE 1 MG/1
5 TABLET, FILM COATED ORAL DAILY
Refills: 0 | Status: DISCONTINUED | OUTPATIENT
Start: 2025-03-19 | End: 2025-03-31

## 2025-03-19 RX ORDER — ATORVASTATIN CALCIUM 80 MG/1
10 TABLET, FILM COATED ORAL AT BEDTIME
Refills: 0 | Status: DISCONTINUED | OUTPATIENT
Start: 2025-03-19 | End: 2025-03-31

## 2025-03-19 RX ADMIN — Medication 650 MILLIGRAM(S): at 20:21

## 2025-03-19 RX ADMIN — CEFEPIME 2000 MILLIGRAM(S): 2 INJECTION, POWDER, FOR SOLUTION INTRAVENOUS at 20:34

## 2025-03-19 RX ADMIN — SODIUM CHLORIDE 2300 MILLILITER(S): 9 INJECTION, SOLUTION INTRAVENOUS at 19:01

## 2025-03-19 RX ADMIN — CEFEPIME 100 MILLIGRAM(S): 2 INJECTION, POWDER, FOR SOLUTION INTRAVENOUS at 19:00

## 2025-03-19 RX ADMIN — SODIUM CHLORIDE 2300 MILLILITER(S): 9 INJECTION, SOLUTION INTRAVENOUS at 20:10

## 2025-03-19 RX ADMIN — Medication 650 MILLIGRAM(S): at 22:30

## 2025-03-19 RX ADMIN — Medication 650 MILLIGRAM(S): at 19:01

## 2025-03-19 NOTE — ED ADULT TRIAGE NOTE - CHIEF COMPLAINT QUOTE
pt biba for fever and ams as per son   pt has been on amoxicillin for 2 days for fever, unknown origin   pts wife has been giving tylenol every 6 hours

## 2025-03-19 NOTE — ED PROVIDER NOTE - CARE PLAN
Principal Discharge DX:	Acute UTI  Secondary Diagnosis:	Sepsis   1 Principal Discharge DX:	Acute UTI  Secondary Diagnosis:	Sepsis  Secondary Diagnosis:	Subacute subdural hematoma

## 2025-03-19 NOTE — ED PROVIDER NOTE - OBJECTIVE STATEMENT
this is a 86 yo male presents to ed for evaluation of fever. patient had been weak . patient was started on antibiotics but is not improving . patient family called primary medical provider and they were told to come to ed for iv antibiotics

## 2025-03-19 NOTE — H&P ADULT - HISTORY OF PRESENT ILLNESS
83 y/o man with PMH of late onset alzheimer's dementia with hallucination (followed by Dr. Juan Neurologist), paroxysmal Afib on xarelto , Aortic Stenosis s/p AV replacement; CAD s/p CABG, CHF,  HTN and hyperlipidema presenting with AMS that has been going on for the past 3 days.  fever noticed in the ed.  Patient is lethargic.  unable to obtain ros/hx  as per wife, patient might has fell several months ago    Patient declined transfer to [place] for neurosurgery evaluation with full understanding of risks, benefits, and alteratives  discussed case with neuro surgery who recommended to hold off the anticoagulation, and head CT scan in 6 hrs

## 2025-03-19 NOTE — CONSULT NOTE ADULT - CRITICAL CARE ATTENDING COMMENT
In summary, this is an 85 yr old with hx of afib , mechanical valve , and dvt on xareleto presented with fever, and confusion , with sepsis . found to have chronic subdural hematoma. no acute neurological changes or deficit . neurosurgery , no emergent intervention . hold xarelto until repeat CT. and follow with neurosurgery before initiation .  repeat CT for any change in neurological status . repeat CT in am .  The rest of the details as above.

## 2025-03-19 NOTE — H&P ADULT - NSHPPHYSICALEXAM_GEN_ALL_CORE
Vital Signs Last 24 Hrs  T(C): 39.9 (19 Mar 2025 23:57), Max: 39.9 (19 Mar 2025 23:57)  T(F): 103.8 (19 Mar 2025 23:57), Max: 103.8 (19 Mar 2025 23:57)  HR: 76 (19 Mar 2025 23:57) (65 - 80)  BP: 147/101 (19 Mar 2025 21:35) (128/59 - 170/72)  BP(mean): --  RR: 20 (19 Mar 2025 23:57) (20 - 28)  SpO2: 94% (19 Mar 2025 23:57) (94% - 97%)    Parameters below as of 19 Mar 2025 21:04  Patient On (Oxygen Delivery Method): room air      PHYSICAL EXAM-  GENERAL: NAD   HEAD:  Atraumatic, Normocephalic  EYES: EOMI, PERRLA, conjunctiva and sclera clear  NECK: Supple, No JVD, Normal thyroid  NERVOUS SYSTEM:  obtundated unable to asses  CHEST/LUNG: Clear to percussion bilaterally; No rales, rhonchi, wheezing, or rubs  HEART: Regular rate and rhythm; No murmurs, rubs, or gallops  ABDOMEN: Soft, Nontender, Nondistended; Bowel sounds present  EXTREMITIES:  2+ Peripheral Pulses, No clubbing, cyanosis, or edema  SKIN: No rashes or lesions

## 2025-03-19 NOTE — H&P ADULT - CONVERSATION DETAILS
Current plan of care, and prognosis were discussed with patient in details, all option were discussed in details  including CPR procedure, shock procedure, and intubation procedure.   Explained that duration of machines/inbutaion/pressor are unknown, and they are based on the underline issue.   patient opted for DNR/DNI with full understanding of what it involves in details  time 30min

## 2025-03-19 NOTE — ED PROVIDER NOTE - ATTENDING APP SHARED VISIT CONTRIBUTION OF CARE
85-year-old male past medical history of atrial fibrillation, history of mechanical valve, DVT, on Xarelto, history of dementia, presents from home via EMS accompanied by his family for evaluation of fever and increased confusion over the last 2 days.  Patient was seen by home doctors and started on amoxicillin.  Patient took 2 days worth.  Also had an x-ray of his chest done yesterday which family was told was normal.  On exam patient is in NAD, sleepy, minimally responsive, skin is warm to touch, positive murmur, abdomen soft, winces in pain when touched, no edema, lungs CTA B/L

## 2025-03-19 NOTE — H&P ADULT - ASSESSMENT
85 y/o man with PMH of late onset alzheimer's dementia with hallucination (followed by Dr. Juan Neurologist), paroxysmal Afib on xarelto , Aortic Stenosis s/p AV replacement; CAD s/p CABG, CHF,  HTN and hyperlipidema presenting with    #AMS  -Head CT scan shows Predominantly low density bilateral subdural collections are noted likely   reflecting subacute to chronic subdural hematomas. No significant mass   effect or midline shift.  -possible metabolic encephalopathy in the setting of infection  -declined transfer to [place] for neurosurgery evaluation  -Hold anticoagulation, and repeat head CT scan in 6 hrs  -Treat underline UTI  -neuro checks  -NPO pending speech therapy consult   -follow up cultures  -Sitter consult     #Alzheimer/fall risks/atrial fib/CHF/HTN  -continue with home medications      #Progress Note Handoff  Pending (specify):  as above   Family discussion:  plan of care was discussed with patient and family in details.  all questions were answered.  seems to understand, and in agreement  Disposition:  unknown  overall prognosis poor   85 y/o man with PMH of late onset alzheimer's dementia with hallucination (followed by Dr. Juan Neurologist), paroxysmal Afib on xarelto , Aortic Stenosis s/p AV replacement; CAD s/p CABG, CHF,  HTN and hyperlipidema presenting with    #AMS  -Head CT scan shows Predominantly low density bilateral subdural collections are noted likely   reflecting subacute to chronic subdural hematomas. No significant mass   effect or midline shift.  -possible metabolic encephalopathy in the setting of infection  -declined transfer to [place] for neurosurgery evaluation  -Hold anticoagulation, and repeat head CT scan in 6 hrs  -Treat underline UTI  -neuro checks  -NPO pending speech therapy consult   -follow up cultures  -Sitter consult   -Neuro and ID consult     #Alzheimer/fall risks/atrial fib/CHF/HTN  -continue with home medications      #Progress Note Handoff  Pending (specify):  as above   Family discussion:  plan of care was discussed with patient and family in details.  all questions were answered.  seems to understand, and in agreement  Disposition:  unknown  overall prognosis poor

## 2025-03-19 NOTE — ED PROVIDER NOTE - TEMPLATE
Health Maintenance Due   Topic Date Due   â¢ Shingles Vaccine (1 of 2) 12/15/2015   â¢ Lung Cancer Screening  12/15/2020   â¢ Depression Screening  06/02/2021       Patient is up to date, no discussion needed. General

## 2025-03-19 NOTE — ED PROVIDER NOTE - CLINICAL SUMMARY MEDICAL DECISION MAKING FREE TEXT BOX
IV initiated.  Labs obtained including cultures.  Antibiotics were initiated.  Urinalysis is concerning for UTI.  CT scan results reviewed.  Patient with subacute to chronic subdural as well as L2 transverse process fracture.  Discussed these results with family.  They state no history of fall that they know of.  Discussed with him about transfer to [facility] for trauma and neurosurgery evaluation.  Patient's family adamantly refuses at this time.  They prefer to stay at [facility].  We did speak with neurosurgery.  The recommendation is to hold Xarelto at this time.  Hospitalist requested we speak to intensivist which we did.  Patient does not require ICU level of care at this time

## 2025-03-19 NOTE — ED PROVIDER NOTE - PROGRESS NOTE DETAILS
ran case by intensivist. . patient does not need stepdown neurosurgery was consulted and case discussed. T2 deformity there is nothing to go especially when chronic.    patient had subacute subdurals and neurosurgery states hold xarelto. results of ct reviewed . family was questioned but they states that patient did not fall. patient family does not [place]. they understand risk .

## 2025-03-19 NOTE — PATIENT PROFILE ADULT - FALL HARM RISK - HARM RISK INTERVENTIONS

## 2025-03-19 NOTE — ED ADULT NURSE NOTE - HISTORY OF COVID-19 VACCINATION
[[email or web address]] : Preopdx:facial nevus Procedure: excisional biopsy   2.1cm nevus of temple and complex closure 2.1cm Anesthesia: local 1% w/epi Specimens: to path on formalin No complications  Summary: IC obtained.  Lesion demarcated with marking pen.  1%lido with epinephrine injected.  15 blade used to incise full thickness.   2.1Cm  lesion excised as an ellipse full thickness in subcutaneous plane.   Hemostasis obtained with cautery.  Skin edges widely undermined and closed for a complex closure of  2.1cm.  bacitracin and steristrips placed. Yes

## 2025-03-19 NOTE — CONSULT NOTE ADULT - ASSESSMENT
85-year-old male past medical history of atrial fibrillation, history of mechanical valve, DVT, on Xarelto, history of dementia, presents to ED for evaluation of fever and increased confusion over the last 2 days.    Altered mental Status- Most likely 2.2 UTI   Trend WBC/ Procal/ Lactate   Cont antibiotics  IVF   BCx + UCx   RVP     Subdural hematoma- Subacute vs chronic   Visualized on CT head   Case discussed between ED + Neuro surgery  Neuro surgery is not recommending emergent surgical intervention. However,  they do recommend transfer to [facility] for eval  Family refusing transfer   Repeat CT in AM   Hold Next dose Xarelto until repeat CT is completed     Dispo: Admit to Low-Risk tele floor   Case discussed with Intensivist on Duty     Javad PINEDA

## 2025-03-19 NOTE — ED ADULT NURSE NOTE - NSFALLHARMRISKINTERV_ED_ALL_ED
Assistance OOB with selected safe patient handling equipment if applicable/Assistance with ambulation/Communicate risk of Fall with Harm to all staff, patient, and family/Monitor gait and stability/Provide visual cue: red socks, yellow wristband, yellow gown, etc/Reinforce activity limits and safety measures with patient and family/Bed in lowest position, wheels locked, appropriate side rails in place/Call bell, personal items and telephone in reach/Instruct patient to call for assistance before getting out of bed/chair/stretcher/Non-slip footwear applied when patient is off stretcher/[place] to call system/Physically safe environment - no spills, clutter or unnecessary equipment/Purposeful Proactive Rounding/Room/bathroom lighting operational, light cord in reach

## 2025-03-19 NOTE — PATIENT PROFILE ADULT - DEAF OR HARD OF HEARING?
no Patient awake, alert, looking comfortable. Per nursing team patient had one well formed BM, no report of vomiting. No other complaints or events reported    General: awake, alert, making eye contact, no labored breathing on RA  HEENT: AT/NC, no facial asymmetry  Lungs: no crackles, no wheezes  Heart: RRR  Abdomen: soft, no tenderness to palpation, no guarding, + BS  Extremities: warm, no edema, no tenderness, no calf tenderness, no focal deficit     Labs reviewed    Patient with resolved diarrhea, continue on Fidaxomicin, no leucocytosis, AXR reviewed  Kidney function improving, will adjust PO Bicarb. Will get Nephrology as patient has scheduled follow-up.   Patient with history of recurrent UTI, no leucocytosis, she is unable to report if having symptoms secondary to dementia, will continue to monitor.    Can DC later if no diarrhea today.

## 2025-03-20 LAB
GRAM STN FLD: ABNORMAL
GRAM STN FLD: ABNORMAL
METHOD TYPE: SIGNIFICANT CHANGE UP
P AERUGINOSA DNA BLD POS NAA+NON-PROBE: SIGNIFICANT CHANGE UP
SPECIMEN SOURCE: SIGNIFICANT CHANGE UP
SPECIMEN SOURCE: SIGNIFICANT CHANGE UP

## 2025-03-20 PROCEDURE — 70450 CT HEAD/BRAIN W/O DYE: CPT | Mod: 26

## 2025-03-20 PROCEDURE — 99222 1ST HOSP IP/OBS MODERATE 55: CPT | Mod: FS

## 2025-03-20 PROCEDURE — 99233 SBSQ HOSP IP/OBS HIGH 50: CPT

## 2025-03-20 RX ORDER — PIPERACILLIN-TAZO-DEXTROSE,ISO 3.375G/5
3.38 IV SOLUTION, PIGGYBACK PREMIX FROZEN(ML) INTRAVENOUS EVERY 8 HOURS
Refills: 0 | Status: DISCONTINUED | OUTPATIENT
Start: 2025-03-20 | End: 2025-03-20

## 2025-03-20 RX ORDER — KETOROLAC TROMETHAMINE 30 MG/ML
15 INJECTION, SOLUTION INTRAMUSCULAR; INTRAVENOUS ONCE
Refills: 0 | Status: DISCONTINUED | OUTPATIENT
Start: 2025-03-20 | End: 2025-03-20

## 2025-03-20 RX ORDER — PIPERACILLIN-TAZO-DEXTROSE,ISO 3.375G/5
4.5 IV SOLUTION, PIGGYBACK PREMIX FROZEN(ML) INTRAVENOUS EVERY 8 HOURS
Refills: 0 | Status: DISCONTINUED | OUTPATIENT
Start: 2025-03-20 | End: 2025-03-23

## 2025-03-20 RX ORDER — ACETAMINOPHEN 500 MG/5ML
1000 LIQUID (ML) ORAL ONCE
Refills: 0 | Status: COMPLETED | OUTPATIENT
Start: 2025-03-20 | End: 2025-03-20

## 2025-03-20 RX ORDER — VANCOMYCIN HCL IN 5 % DEXTROSE 1.5G/250ML
750 PLASTIC BAG, INJECTION (ML) INTRAVENOUS ONCE
Refills: 0 | Status: COMPLETED | OUTPATIENT
Start: 2025-03-20 | End: 2025-03-20

## 2025-03-20 RX ADMIN — Medication 400 MILLIGRAM(S): at 17:55

## 2025-03-20 RX ADMIN — ATORVASTATIN CALCIUM 10 MILLIGRAM(S): 80 TABLET, FILM COATED ORAL at 22:59

## 2025-03-20 RX ADMIN — FINASTERIDE 5 MILLIGRAM(S): 1 TABLET, FILM COATED ORAL at 11:52

## 2025-03-20 RX ADMIN — Medication 25 GRAM(S): at 23:03

## 2025-03-20 RX ADMIN — SERTRALINE 50 MILLIGRAM(S): 100 TABLET, FILM COATED ORAL at 11:52

## 2025-03-20 RX ADMIN — Medication 400 MILLIGRAM(S): at 11:51

## 2025-03-20 RX ADMIN — KETOROLAC TROMETHAMINE 15 MILLIGRAM(S): 30 INJECTION, SOLUTION INTRAMUSCULAR; INTRAVENOUS at 01:34

## 2025-03-20 RX ADMIN — Medication 1000 MILLIGRAM(S): at 12:31

## 2025-03-20 RX ADMIN — Medication 250 MILLIGRAM(S): at 00:43

## 2025-03-20 RX ADMIN — Medication 70 MILLILITER(S): at 01:48

## 2025-03-20 RX ADMIN — KETOROLAC TROMETHAMINE 15 MILLIGRAM(S): 30 INJECTION, SOLUTION INTRAMUSCULAR; INTRAVENOUS at 01:02

## 2025-03-20 RX ADMIN — CEFEPIME 100 MILLIGRAM(S): 2 INJECTION, POWDER, FOR SOLUTION INTRAVENOUS at 06:01

## 2025-03-20 RX ADMIN — TAMSULOSIN HYDROCHLORIDE 0.4 MILLIGRAM(S): 0.4 CAPSULE ORAL at 22:59

## 2025-03-20 RX ADMIN — QUETIAPINE FUMARATE 12.5 MILLIGRAM(S): 25 TABLET ORAL at 22:59

## 2025-03-20 NOTE — CONSULT NOTE ADULT - NS ATTEND AMEND GEN_ALL_CORE FT
Patient seen and examined and agree with above except as noted.  Patients history, notes, labs, imaging, vitals and meds reviewed personally.  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

## 2025-03-20 NOTE — SWALLOW BEDSIDE ASSESSMENT ADULT - PHARYNGEAL PHASE
Delayed pharyngeal swallow/Multiple swallows Delayed pharyngeal swallow/Cough post oral intake/Multiple swallows

## 2025-03-20 NOTE — DIETITIAN INITIAL EVALUATION ADULT - NS FNS DIET ORDER
Diet, Pureed:   Mildly Thick Liquids (MILDTHICKLIQS)  Supplement Feeding Modality:  Oral  Ensure Plus High Protein Cans or Servings Per Day:  3       Frequency:  Three Times a day (03-20-25 @ 13:51)

## 2025-03-20 NOTE — PROGRESS NOTE ADULT - SUBJECTIVE AND OBJECTIVE BOX
Patient is a 85y old  Male who presents with a chief complaint of AMS (19 Mar 2025 23:59)      MEDICATIONS  (STANDING):  aMIOdarone    Tablet 100 milliGRAM(s) Oral <User Schedule>  atorvastatin 10 milliGRAM(s) Oral at bedtime  finasteride 5 milliGRAM(s) Oral daily  pantoprazole    Tablet 40 milliGRAM(s) Oral before breakfast  QUEtiapine 12.5 milliGRAM(s) Oral at bedtime  sertraline 50 milliGRAM(s) Oral daily  sodium chloride 0.9%. 1000 milliLiter(s) (70 mL/Hr) IV Continuous <Continuous>  tamsulosin 0.4 milliGRAM(s) Oral at bedtime    MEDICATIONS  (PRN):  acetaminophen     Tablet .. 650 milliGRAM(s) Oral every 6 hours PRN Temp greater or equal to 38C (100.4F), Mild Pain (1 - 3)  ondansetron Injectable 4 milliGRAM(s) IV Push every 6 hours PRN Nausea  senna 2 Tablet(s) Oral at bedtime PRN Constipation      CAPILLARY BLOOD GLUCOSE  I&O's Summary      PHYSICAL EXAM:  Vital Signs Last 24 Hrs  T(C): 36.3 (20 Mar 2025 04:24), Max: 39.9 (19 Mar 2025 23:57)  T(F): 97.4 (20 Mar 2025 04:24), Max: 103.8 (19 Mar 2025 23:57)  HR: 66 (20 Mar 2025 04:24) (65 - 80)  BP: 127/67 (20 Mar 2025 04:24) (127/67 - 170/72)  BP(mean): --  RR: 18 (20 Mar 2025 04:24) (18 - 28)  SpO2: 96% (20 Mar 2025 04:24) (94% - 97%)    Parameters below as of 20 Mar 2025 04:24  Patient On (Oxygen Delivery Method): room air        GENERAL: No acute distress, well-developed  HEAD:  Atraumatic, Normocephalic  EYES: conjunctiva and sclera clear  NECK: Supple,, no JVD  CHEST/LUNG: CTAB; No wheezes  HEART: Regular rate and rhythm; No murmur  ABDOMEN: Soft, non-tender, non-distended  EXTREMITIES:  , No clubbing, cyanosis, or edema  NEUROLOGY: A&O x 1 no focal deficits      LABS:                        10.8   12.94 )-----------( 212      ( 19 Mar 2025 18:30 )             31.5     03-    143  |  106  |  29[H]  ----------------------------<  117[H]  4.4   |  27  |  1.1    Ca    8.3[L]      19 Mar 2025 18:30    TPro  6.0  /  Alb  3.2[L]  /  TBili  0.5  /  DBili  x   /  AST  28  /  ALT  31  /  AlkPhos  121[H]  03-    PT/INR - ( 19 Mar 2025 18:30 )   PT: 20.20 sec;   INR: 1.69 ratio         PTT - ( 19 Mar 2025 18:30 )  PTT:36.4 sec      Urinalysis Basic - ( 19 Mar 2025 19:35 )    Color: Dark Yellow / Appearance: Turbid / S.025 / pH: x  Gluc: x / Ketone: Negative mg/dL  / Bili: Negative / Urobili: 1.0 mg/dL   Blood: x / Protein: 100 mg/dL / Nitrite: Negative   Leuk Esterase: Large / RBC: 7 /HPF / WBC Too Numerous to count /HPF   Sq Epi: x / Non Sq Epi: x / Bacteria: Few /HPF        Urinalysis with Rflx Culture (collected 19 Mar 2025 19:35)     Patient is a 85y old  Male who presents with a chief complaint of AMS (19 Mar 2025 23:59)      MEDICATIONS  (STANDING):  aMIOdarone    Tablet 100 milliGRAM(s) Oral <User Schedule>  atorvastatin 10 milliGRAM(s) Oral at bedtime  finasteride 5 milliGRAM(s) Oral daily  pantoprazole    Tablet 40 milliGRAM(s) Oral before breakfast  QUEtiapine 12.5 milliGRAM(s) Oral at bedtime  sertraline 50 milliGRAM(s) Oral daily  sodium chloride 0.9%. 1000 milliLiter(s) (70 mL/Hr) IV Continuous <Continuous>  tamsulosin 0.4 milliGRAM(s) Oral at bedtime    MEDICATIONS  (PRN):  acetaminophen     Tablet .. 650 milliGRAM(s) Oral every 6 hours PRN Temp greater or equal to 38C (100.4F), Mild Pain (1 - 3)  ondansetron Injectable 4 milliGRAM(s) IV Push every 6 hours PRN Nausea  senna 2 Tablet(s) Oral at bedtime PRN Constipation      CAPILLARY BLOOD GLUCOSE  I&O's Summary      PHYSICAL EXAM:  Vital Signs Last 24 Hrs  T(C): 36.3 (20 Mar 2025 04:24), Max: 39.9 (19 Mar 2025 23:57)  T(F): 97.4 (20 Mar 2025 04:24), Max: 103.8 (19 Mar 2025 23:57)  HR: 66 (20 Mar 2025 04:24) (65 - 80)  BP: 127/67 (20 Mar 2025 04:24) (127/67 - 170/72)  BP(mean): --  RR: 18 (20 Mar 2025 04:24) (18 - 28)  SpO2: 96% (20 Mar 2025 04:24) (94% - 97%)    Parameters below as of 20 Mar 2025 04:24  Patient On (Oxygen Delivery Method): room air        GENERAL: No acute distress, well-developed  HEAD:  Atraumatic, Normocephalic  EYES: conjunctiva and sclera clear  NECK: Supple,, no JVD  CHEST/LUNG: CTAB; No wheezes  HEART: Regular rate and rhythm; No murmur  ABDOMEN: Soft, non-tender, non-distended  EXTREMITIES:  , No clubbing, cyanosis, or edema  NEUROLOGY: A&O x 0,  no focal deficits      LABS:                        10.8   12.94 )-----------( 212      ( 19 Mar 2025 18:30 )             31.5     03-    143  |  106  |  29[H]  ----------------------------<  117[H]  4.4   |  27  |  1.1    Ca    8.3[L]      19 Mar 2025 18:30    TPro  6.0  /  Alb  3.2[L]  /  TBili  0.5  /  DBili  x   /  AST  28  /  ALT  31  /  AlkPhos  121[H]  03-    PT/INR - ( 19 Mar 2025 18:30 )   PT: 20.20 sec;   INR: 1.69 ratio         PTT - ( 19 Mar 2025 18:30 )  PTT:36.4 sec      Urinalysis Basic - ( 19 Mar 2025 19:35 )    Color: Dark Yellow / Appearance: Turbid / S.025 / pH: x  Gluc: x / Ketone: Negative mg/dL  / Bili: Negative / Urobili: 1.0 mg/dL   Blood: x / Protein: 100 mg/dL / Nitrite: Negative   Leuk Esterase: Large / RBC: 7 /HPF / WBC Too Numerous to count /HPF   Sq Epi: x / Non Sq Epi: x / Bacteria: Few /HPF        Urinalysis with Rflx Culture (collected 19 Mar 2025 19:35)     Patient is a 85y old  Male who presents with a chief complaint of AMS (19 Mar 2025 23:59)      MEDICATIONS  (STANDING):  aMIOdarone    Tablet 100 milliGRAM(s) Oral <User Schedule>  atorvastatin 10 milliGRAM(s) Oral at bedtime  finasteride 5 milliGRAM(s) Oral daily  pantoprazole    Tablet 40 milliGRAM(s) Oral before breakfast  QUEtiapine 12.5 milliGRAM(s) Oral at bedtime  sertraline 50 milliGRAM(s) Oral daily  sodium chloride 0.9%. 1000 milliLiter(s) (70 mL/Hr) IV Continuous <Continuous>  tamsulosin 0.4 milliGRAM(s) Oral at bedtime    MEDICATIONS  (PRN):  acetaminophen     Tablet .. 650 milliGRAM(s) Oral every 6 hours PRN Temp greater or equal to 38C (100.4F), Mild Pain (1 - 3)  ondansetron Injectable 4 milliGRAM(s) IV Push every 6 hours PRN Nausea  senna 2 Tablet(s) Oral at bedtime PRN Constipation      CAPILLARY BLOOD GLUCOSE  I&O's Summary      PHYSICAL EXAM:  Vital Signs Last 24 Hrs  T(C): 36.3 (20 Mar 2025 04:24), Max: 39.9 (19 Mar 2025 23:57)  T(F): 97.4 (20 Mar 2025 04:24), Max: 103.8 (19 Mar 2025 23:57)  HR: 66 (20 Mar 2025 04:24) (65 - 80)  BP: 127/67 (20 Mar 2025 04:24) (127/67 - 170/72)  BP(mean): --  RR: 18 (20 Mar 2025 04:24) (18 - 28)  SpO2: 96% (20 Mar 2025 04:24) (94% - 97%)    Parameters below as of 20 Mar 2025 04:24  Patient On (Oxygen Delivery Method): room air        GENERAL: No acute distress, multiple intermittent Jerks and tremors (baseline)   HEAD:  Atraumatic, Normocephalic  EYES: conjunctiva and sclera clear  NECK: Supple,, no JVD  CHEST/LUNG: CTAB; No wheezes  HEART: Regular rate and rhythm; No murmur  ABDOMEN: Soft, non-tender, non-distended  EXTREMITIES:  , No clubbing, cyanosis, or edema  NEUROLOGY: A&O x 0,  no focal deficits      LABS:                        10.8   12.94 )-----------( 212      ( 19 Mar 2025 18:30 )             31.5     -    143  |  106  |  29[H]  ----------------------------<  117[H]  4.4   |  27  |  1.1    Ca    8.3[L]      19 Mar 2025 18:30    TPro  6.0  /  Alb  3.2[L]  /  TBili  0.5  /  DBili  x   /  AST  28  /  ALT  31  /  AlkPhos  121[H]  03-    PT/INR - ( 19 Mar 2025 18:30 )   PT: 20.20 sec;   INR: 1.69 ratio         PTT - ( 19 Mar 2025 18:30 )  PTT:36.4 sec      Urinalysis Basic - ( 19 Mar 2025 19:35 )    Color: Dark Yellow / Appearance: Turbid / S.025 / pH: x  Gluc: x / Ketone: Negative mg/dL  / Bili: Negative / Urobili: 1.0 mg/dL   Blood: x / Protein: 100 mg/dL / Nitrite: Negative   Leuk Esterase: Large / RBC: 7 /HPF / WBC Too Numerous to count /HPF   Sq Epi: x / Non Sq Epi: x / Bacteria: Few /HPF        Urinalysis with Rflx Culture (collected 19 Mar 2025 19:35)

## 2025-03-20 NOTE — DIETITIAN INITIAL EVALUATION ADULT - OTHER INFO
pt is 85 year old male with hx of late onset Alzheimer's with hallucinations, p.afib, aortic stenosis s/p AV replacement, CABG, CHF, HTN p/w AMS x 3 days and temp pt admitted with metabolic encephalopathy, SIRS, CTH with subacute to chronic SDH's.

## 2025-03-20 NOTE — DIETITIAN INITIAL EVALUATION ADULT - ORAL INTAKE PTA/DIET HISTORY
as per son Carlos via phone pt consumed applesauce, on occasion ground foods but mostly would just consume 4-5 vanilla ensures daily. Family is insistent that pt receives at least 4-5 ensures per day. NKFA or intolerances. Family denies any recent weight changes. As per EMR review pt weighed 73.3 kgs in June of 2024 vs 66 kgs today, family questions accuracy of weight hx.    pt is presently on a puree diet wit mildly thick fluids and ensure plus 3 cans 3 times per day. Pt consumed <25% of meals.

## 2025-03-20 NOTE — PROGRESS NOTE ADULT - ASSESSMENT
83 y/o man with PMH of late onset alzheimer's dementia with hallucination (followed by Dr. Juan Neurologist), paroxysmal Afib on xarelto , Aortic Stenosis s/p AV replacement; CAD s/p CABG, CHF,  HTN and hyperlipidema presenting with    Metabolic Encephalopathy   SIRS present on admission   Infectious from Acute cystitis and possibly Structural from Hematoma  Head CT: subacute to chronic subdural hematomas. No significant mass effect or midline shift.  Neurosurgery Recommended [place] to [place] but Family declined understanding risk of brain herniation and death   Patient on Xarelto for AFIB: continue to hold for now   Repeat Head CT:The density of the bilateral cerebral convexity and falcine subdural collections appears uniformly increased when compared to   the previous head CT probably reflecting IV contrast. Recommend short-term follow-up imaging.  Treat underline UTI and follow-up Ucx, and bcx   Neuro checks Q8, keep NPO pending speech therapy consult     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  AORTIC Stenosis s/p AV replacement   Paroxysmal AFIB + HTN = HLD   -BP on Lower side, Hold Antihypertensives and continue IVF   -Continue Amiodarone , Statin, and DASH    BPH: continue Finasteride  and Flomax   Dementia: continue Seroquel and Zoloft with  daily re-orientation     DVT PPX: Xarelto on hold for Hematoma, SCD's for now   GI PPX: PPI   DNI/DNR   Dispo: From Home   Pending Clinical Improvement  83 y/o man with PMH of late onset alzheimer's dementia with hallucination (followed by Dr. Juan Neurologist), paroxysmal Afib on xarelto , Aortic Stenosis s/p AV replacement; CAD s/p CABG, CHF,  HTN and hyperlipidema presenting with    Metabolic Encephalopathy   SIRS present on admission   Infectious from Acute cystitis and possibly Structural from Hematoma  Head CT: subacute to chronic subdural hematomas. No significant mass effect or midline shift.  Neurosurgery Recommended [place] but Family declined understanding risk of brain herniation and death   Patient on Xarelto for AFIB: continue to hold for now   Repeat Head CT: The density of the bilateral cerebral convexity and falcine subdural collections appears uniformly increased when compared to   the previous head CT probably reflecting IV contrast. Recommend short-term follow-up imaging.  Treat underline UTI and follow-up Ucx, and bcx   Neuro checks Q8, keep NPO pending speech therapy consult   Will Repeat  Head CT in Am for further evaluation     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  AORTIC Stenosis s/p AV replacement   Paroxysmal AFIB + HTN = HLD   -BP on Lower side, Hold Antihypertensives and continue IVF   -Continue Amiodarone , Statin, and DASH    BPH: continue Finasteride  and Flomax   Dementia: continue Seroquel and Zoloft with  daily re-orientation     DVT PPX: Xarelto on hold for Hematoma, SCD's for now   GI PPX: PPI   DNI/DNR   Dispo: From Home   Pending Clinical Improvement

## 2025-03-20 NOTE — CONSULT NOTE ADULT - ASSESSMENT
85yo M PMH end stage Alzheimer's Disease with hallucinations, paroxysmal Atrial fibrillation (on Xarelto), Aortic [place] s/p AV replacement, CAD s/p CABG, CHF, HTN, HLD presented with worsened AMS x3 days. Admitted for sepsis, probable UTI. Neuro consulted for abnormal CTH. Initial CTH reported likely subacute to chronic subdural hematomas. Repeat CTH grossly stable.      Recommendations  - Neurosurgical Evaluation  - Can resume Xarelto at this time, unless still contraindicated by neurosurgery   - Fall precautions  - Recall neurology prn   - Medical management per primary team    Discussed with attending Dr Gallegos

## 2025-03-20 NOTE — DIETITIAN INITIAL EVALUATION ADULT - PERTINENT LABORATORY DATA
03-19    143  |  106  |  29[H]  ----------------------------<  117[H]  4.4   |  27  |  1.1    Ca    8.3[L]      19 Mar 2025 18:30    TPro  6.0  /  Alb  3.2[L]  /  TBili  0.5  /  DBili  x   /  AST  28  /  ALT  31  /  AlkPhos  121[H]  03-19  A1C with Estimated Average Glucose Result: 6.1 % (06-25-24 @ 08:12)   03-19    143  |  106  |  29[H]  ----------------------------<  117[H]  4.4   |  27  |  1.1    Ca    8.3[L]      19 Mar 2025 18:30    TPro  6.0  /  Alb  3.2[L]  /  TBili  0.5  /  DBili  x   /  AST  28  /  ALT  31  /  AlkPhos  121[H]  03-19  A1C with Estimated Average Glucose Result: 6.1 % (06-25-24 @ 08:12)                          10.8   12.94 )-----------( 212      ( 19 Mar 2025 18:30 )             31.5

## 2025-03-20 NOTE — CONSULT NOTE ADULT - SUBJECTIVE AND OBJECTIVE BOX
NEUROLOGY CONSULT    HPI:  83 y/o man with PMH of late onset alzheimer's dementia with hallucination (followed by Dr. Juan Neurologist), paroxysmal Afib on xarelto , Aortic Stenosis s/p AV replacement; CAD s/p CABG, CHF,  HTN and hyperlipidema presenting with AMS that has been going on for the past 3 days.  fever noticed in the ed.  Patient is lethargic.  unable to obtain ros/hx  as per wife, patient might has fell several months ago    Patient declined transfer to [place] for neurosurgery evaluation with full understanding of risks, benefits, and alteratives  discussed case with neuro surgery who recommended to hold off the anticoagulation, and head CT scan in 6 hrs      (19 Mar 2025 23:59)     MEDICATIONS  Home Medications:  amiodarone 100 mg oral tablet: 1 tab(s) orally every other day (19 Mar 2025 19:36)  atorvastatin 10 mg oral tablet: 1 tab(s) orally once a day (19 Mar 2025 19:36)  finasteride 5 mg oral tablet: 1 tab(s) orally once a day (19 Mar 2025 19:36)  furosemide 20 mg oral tablet: 1 tab(s) orally once a day (19 Mar 2025 22:18)  QUEtiapine:  (19 Mar 2025 22:18)  Seroquel 25 mg oral tablet: 0.5 tab(s) orally once a day (at bedtime) Spouse gives med @ 1500 (19 Mar 2025 22:18)  sertraline 50 mg oral tablet: 1 tab(s) orally once a day (19 Mar 2025 19:36)  solifenacin 10 mg oral tablet: 1 tab(s) orally once a day (at bedtime) (19 Mar 2025 19:36)  tamsulosin 0.4 mg oral capsule: 1 cap(s) orally once a day (19 Mar 2025 19:36)  Xarelto 20 mg oral tablet: 1 tab(s) orally once a day (19 Mar 2025 19:36)    MEDICATIONS  (STANDING):  aMIOdarone    Tablet 100 milliGRAM(s) Oral <User Schedule>  atorvastatin 10 milliGRAM(s) Oral at bedtime  finasteride 5 milliGRAM(s) Oral daily  pantoprazole    Tablet 40 milliGRAM(s) Oral before breakfast  QUEtiapine 12.5 milliGRAM(s) Oral at bedtime  sertraline 50 milliGRAM(s) Oral daily  sodium chloride 0.9%. 1000 milliLiter(s) (70 mL/Hr) IV Continuous <Continuous>  tamsulosin 0.4 milliGRAM(s) Oral at bedtime    MEDICATIONS  (PRN):  acetaminophen     Tablet .. 650 milliGRAM(s) Oral every 6 hours PRN Temp greater or equal to 38C (100.4F), Mild Pain (1 - 3)  ondansetron Injectable 4 milliGRAM(s) IV Push every 6 hours PRN Nausea  senna 2 Tablet(s) Oral at bedtime PRN Constipation      FAMILY HISTORY:    SOCIAL HISTORY: negative for tobacco, alcohol, or ilicit drug use.    Allergies    No Known Allergies    Intolerances        GEN: NAD. Exam limited by severe dementia    NEURO:   MENTAL STATUS: Awake. Not oriented. Does not follow commands   CRANIAL NERVES:  III, IV, VI: EOM intact, no gaze preference or deviation  VII: no facial asymmetry  VIII: normal hearing to speech  MOTOR: Moving all four extremities independently. Myoclonic jerks throughout   REFLEXES: downgoing toes   SENSORY: Withdraws from noxious stimuli         LABS:                        10.8   12.94 )-----------( 212      ( 19 Mar 2025 18:30 )             31.5     -    143  |  106  |  29[H]  ----------------------------<  117[H]  4.4   |  27  |  1.1    Ca    8.3[L]      19 Mar 2025 18:30    TPro  6.0  /  Alb  3.2[L]  /  TBili  0.5  /  DBili  x   /  AST  28  /  ALT  31  /  AlkPhos  121[H]      Hemoglobin A1C:   Vitamin B12   PT/INR - ( 19 Mar 2025 18:30 )   PT: 20.20 sec;   INR: 1.69 ratio         PTT - ( 19 Mar 2025 18:30 )  PTT:36.4 sec  CAPILLARY BLOOD GLUCOSE          Urinalysis Basic - ( 19 Mar 2025 19:35 )    Color: Dark Yellow / Appearance: Turbid / S.025 / pH: x  Gluc: x / Ketone: Negative mg/dL  / Bili: Negative / Urobili: 1.0 mg/dL   Blood: x / Protein: 100 mg/dL / Nitrite: Negative   Leuk Esterase: Large / RBC: 7 /HPF / WBC Too Numerous to count /HPF   Sq Epi: x / Non Sq Epi: x / Bacteria: Few /HPF            Microbiology:    Urinalysis with Rflx Culture (collected 19 Mar 2025 19:35)        RADIOLOGY    < from: CT Head No Cont (25 @ 00:28) >  IMPRESSION:    Increased attenuation of the dural reflections and intracranial vessels   likely reflecting residual contrast from recent intravenous contrast   administration.    Redemonstration of bilateral hemispheric subdural collections likely   reflecting subacute to chronic subdural hematomas, grossly stable since   prior. No mass effect or midline shift.    ATTENDING ADDENDUM: The density of the bilateral cerebral convexity and   falcine subdural collections appears uniformly increased when compared to   the previous head CT probably reflecting IV contrast. Recommend   short-term follow-up imaging.    --- End of Report ---    < end of copied text >    < from: CT Head No Cont (25 @ 19:53) >  IMPRESSION:  Motion limited examination.    Predominantly low density bilateral subdural collections are noted likely   reflecting subacute to chronic subdural hematomas. No significant mass   effect or midline shift.    --- End of Report ---    < end of copied text >            
84 yo M PMH DVT (on Xarelto), dementia. Presents to ED for evaluation of fever + worsening mental status.   Wife and son present at bedside for additional information.   State that patient first developed fever 2 days ago. Was started on PO antibiotics by PCP.   Instructed to come to ED for evaluation + iv antibiotics. Patient unable to provide further history d/t dementia.     ICU Vital Signs Last 24 Hrs  T(C): 39.7 (19 Mar 2025 21:35), Max: 39.7 (19 Mar 2025 21:35)  T(F): 103.4 (19 Mar 2025 21:35), Max: 103.4 (19 Mar 2025 21:35)  HR: 75 (19 Mar 2025 21:35) (65 - 80)  BP: 147/101 (19 Mar 2025 21:35) (128/59 - 170/72)  BP(mean): --  ABP: --  ABP(mean): --  RR: 24 (19 Mar 2025 21:04) (24 - 28)  SpO2: 94% (19 Mar 2025 21:04) (94% - 97%)    O2 Parameters below as of 19 Mar 2025 21:04  Patient On (Oxygen Delivery Method): room air      PHYSICAL EXAM:    Constitutional: NAD. Awake. Disoriented (consistent with baseline per family)     Eyes: PEERL, EOM intact b/l.     Neck: Supple, non-tender, trachea midline.     Respiratory: CTA b/l. No rhonchi/ rales/ wheeze.     Cardiovascular: S1/ S2 present and clear, no murmur, gallops, or rub.     Gastrointestinal: Abd soft + non-tender. BS NA x 4 quadrants.     Extremities: No edema, clubbing, or cyanosis in Ext x 4.     Vascular: DP + radial Pulse 2+ b/l.     Skin: No rash/ focal lesions.     Neuro: Sensation intact to soft touch + Strength 5/5 in ext x 4.       LABS:                          10.8   12.94 )-----------( 212      ( 19 Mar 2025 18:30 )             31.5         143  |  106  |  29[H]  ----------------------------<  117[H]  4.4   |  27  |  1.1    Ca    8.3[L]      19 Mar 2025 18:30    TPro  6.0  /  Alb  3.2[L]  /  TBili  0.5  /  DBili  x   /  AST  28  /  ALT  31  /  AlkPhos  121[H]  03-19    LIVER FUNCTIONS - ( 19 Mar 2025 18:30 )  Alb: 3.2 g/dL / Pro: 6.0 g/dL / ALK PHOS: 121 U/L / ALT: 31 U/L / AST: 28 U/L / GGT: x           PT/INR - ( 19 Mar 2025 18:30 )   PT: 20.20 sec;   INR: 1.69 ratio         PTT - ( 19 Mar 2025 18:30 )  PTT:36.4 sec  Urinalysis Basic - ( 19 Mar 2025 19:35 )    Color: Dark Yellow / Appearance: Turbid / S.025 / pH: x  Gluc: x / Ketone: Negative mg/dL  / Bili: Negative / Urobili: 1.0 mg/dL   Blood: x / Protein: 100 mg/dL / Nitrite: Negative   Leuk Esterase: Large / RBC: 7 /HPF / WBC Too Numerous to count /HPF   Sq Epi: x / Non Sq Epi: x / Bacteria: Few /HPF      < from: CT Abdomen and Pelvis w/ IV Cont (25 @ 20:01) >  IMPRESSION:  Stool impaction the rectum measuring up to 9 cm with mild to moderate   degree of perirectal fat stranding      < end of copied text >    < from: CT Head No Cont (25 @ 19:53) >  IMPRESSION:  Motion limited examination.    Predominantly low density bilateral subdural collections are noted likely   reflecting subacute to chronic subdural hematomas. No significant mass   effect or midline shift.    < end of copied text >    
TONJA PECK  85y, Male  Allergies    No Known Allergies    Intolerances    LOS  1d    HPI  HPI:  85 y/o man with PMH of late onset alzheimer's dementia with hallucination (followed by Dr. Juan Neurologist), paroxysmal Afib on xarelto , Aortic Stenosis s/p AV replacement; CAD s/p CABG, CHF,  HTN and hyperlipidema presenting with AMS that has been going on for the past 3 days.  fever noticed in the ed.  Patient is lethargic.  unable to obtain ros/hx  as per wife, patient might has fell several months ago    Patient declined transfer to [place] for neurosurgery evaluation with full understanding of risks, benefits, and alteratives  discussed case with neuro surgery who recommended to hold off the anticoagulation, and head CT scan in 6 hrs      (19 Mar 2025 23:59)    INFECTIOUS DISEASE HISTORY:  ID consulted for antimicrobial recommendations.     Prior hospital charts reviewed [Yes]  Primary team notes reviewed [Yes]  Other consultant notes reviewed [Yes]    REVIEW OF SYSTEMS:  CONSTITUTIONAL: No fever or chills  HEENT: No sore throat  RESPIRATORY: No cough, no shortness of breath  CARDIOVASCULAR: No chest pain or palpitations  GASTROINTESTINAL: No abdominal or epigastric pain  GENITOURINARY: No dysuria  NEUROLOGICAL: No headache/dizziness  MSK: No joint pain, erythema, or swelling; no back pain  SKIN: No itching, rashes  All other ROS negative except noted above    PAST MEDICAL & SURGICAL HISTORY:  Afib      CHF (congestive heart failure)      Hypertension      S/P CABG (coronary artery bypass graft)    SOCIAL HISTORY: cannot obtain further history from the patient secondary to altered mental status or sedation      FAMILY HISTORY: No pertinent PMH for first degree relatives.     ANTIMICROBIALS:      ANTIMICROBIALS (past 90 days):  MEDICATIONS  (STANDING):    cefepime   IVPB   100 mL/Hr IV Intermittent (25 @ 19:00)    cefepime   IVPB   100 mL/Hr IV Intermittent (25 @ 06:01)    vancomycin  IVPB   250 mL/Hr IV Intermittent (25 @ 00:43)        OTHER MEDS:   MEDICATIONS  (STANDING):  acetaminophen     Tablet .. 650 every 6 hours PRN  acetaminophen   IVPB .. 1000 once  aMIOdarone    Tablet 100 <User Schedule>  atorvastatin 10 at bedtime  finasteride 5 daily  ondansetron Injectable 4 every 6 hours PRN  pantoprazole    Tablet 40 before breakfast  QUEtiapine 12.5 at bedtime  senna 2 at bedtime PRN  sertraline 50 daily  tamsulosin 0.4 at bedtime      VITALS:  Vital Signs Last 24 Hrs  T(F): 98.7 (25 @ 14:25), Max: 103.8 (25 @ 23:57)    Vital Signs Last 24 Hrs  HR: 63 (25 @ 14:25) (63 - 80)  BP: 138/59 (25 @ 14:25) (127/67 - 170/72)  RR: 20 (25 @ 14:25)  SpO2: 96% (25 @ 14:25) (94% - 97%)  Wt(kg): --    EXAM:  GENERAL: Somnolent. Grimacing with sternal rub.   HEAD: No head lesions  NECK: Supple  CHEST/LUNG: Shallow breath sounds.   HEART: S1 S2  ABDOMEN: Soft, nontender.  EXTREMITIES: No clubbing  NERVOUS SYSTEM: Somnolent.   MSK: No joint erythema, swelling or pain  SKIN: No rashes or lesions, no superficial thrombophlebitis    Labs:                        10.8   12.94 )-----------( 212      ( 19 Mar 2025 18:30 )             31.5         143  |  106  |  29[H]  ----------------------------<  117[H]  4.4   |  27  |  1.1    Ca    8.3[L]      19 Mar 2025 18:30    TPro  6.0  /  Alb  3.2[L]  /  TBili  0.5  /  DBili  x   /  AST  28  /  ALT  31  /  AlkPhos  121[H]        WBC Trend:  WBC Count: 12.94 (25 @ 18:30)      Auto Neutrophil #: 3.96 K/uL (24 @ 07:21)  Auto Neutrophil #: 6.58 K/uL (24 @ 07:09)  Auto Neutrophil #: 6.55 K/uL (24 @ 06:53)  Auto Neutrophil #: 3.06 K/uL (24 @ 08:12)  Auto Neutrophil #: 3.41 K/uL (24 @ 18:40)      Creatine Trend:  Creatinine: 1.1 ()      Liver Biochemical Testing Trend:  Alanine Aminotransferase (ALT/SGPT): 31 ()  Alanine Aminotransferase (ALT/SGPT): 12 ()  Alanine Aminotransferase (ALT/SGPT): 13 ()  Alanine Aminotransferase (ALT/SGPT): 12 ()  Aspartate Aminotransferase (AST/SGOT): 28 (25 @ 18:30)  Aspartate Aminotransferase (AST/SGOT): 22 (24 @ 07:17)  Aspartate Aminotransferase (AST/SGOT): 19 (24 @ 08:12)  Aspartate Aminotransferase (AST/SGOT): 21 (24 @ 18:40)  Bilirubin Total: 0.5 ()  Bilirubin Total: 0.4 ()  Bilirubin Total: 0.5 ()  Bilirubin Total: 0.8 ()      Trend LDH          Urinalysis Basic - ( 19 Mar 2025 19:35 )    Color: Dark Yellow / Appearance: Turbid / S.025 / pH: x  Gluc: x / Ketone: Negative mg/dL  / Bili: Negative / Urobili: 1.0 mg/dL   Blood: x / Protein: 100 mg/dL / Nitrite: Negative   Leuk Esterase: Large / RBC: 7 /HPF / WBC Too Numerous to count /HPF   Sq Epi: x / Non Sq Epi: x / Bacteria: Few /HPF        MICROBIOLOGY:    Male    Urinalysis with Rflx Culture (collected 19 Mar 2025 19:35)    Lactate, Blood: 1.0 ( @ 21:30)  Lactate, Blood: 2.3 ( @ 18:30)        INFLAMMATORY MARKERS      RADIOLOGY & ADDITIONAL TESTS:  I have personally reviewed the imagings.  CXR      CT  CT Abdomen and Pelvis w/ IV Cont:   ACC: [id number] EXAM:  CT ABDOMEN AND PELVIS IC   ORDERED BY: BETSY METZGER     PROCEDURE DATE:  2025          INTERPRETATION:  CLINICAL INFORMATION: Fever, abdominal pain    COMPARISON: CT ABDOMEN/PELVIS 3/19/2025.    CONTRAST/COMPLICATIONS:  IV Contrast: Omnipaque 350  90 cc administered   10 cc discarded  Oral Contrast: NONE    PROCEDURE:  CT of the Abdomen and Pelvis was performed.  Sagittal and coronal reformats were performed.    FINDINGS:  LOWER CHEST: Bibasilar atelectasis. Poststernotomy. Cardiomegaly    LIVER: Within normal limits.  BILE DUCTS: Normal caliber.  GALLBLADDER: Cholelithiasis.  SPLEEN: Within normal limits.  PANCREAS: Within normal limits.  ADRENALS: Within normal limits.  KIDNEYS/URETERS: No renal stones or hydronephrosis.    BLADDER: Mild urinary bladder wall thickening  REPRODUCTIVE ORGANS: Unremarkable at CT.    BOWEL: Stool impaction the rectum measuring up to 9 cm. Mild degree of   perirectal fat stranding. Additional desiccated stool within the colon.  PERITONEUM/RETROPERITONEUM: Within normal limits.  VESSELS: Atherosclerotic changes with extensive visceral vascular   calcifications. Circumaortic left renal vein  LYMPH NODES: No lymphadenopathy.  ABDOMINAL WALL: Small fat-containing umbilical hernia  BONES: Osteopenia. Subacute/chronic left L2 transverse process deformity    IMPRESSION:  Stool impaction the rectum measuring up to 9 cm with mild to moderate   degree of perirectal fat stranding    --- End of Report ---            SCARLET SERVIN MD; Attending Radiologist  This document has been electronically signed. Mar 19 2025  8:22PM (25 @ 20:01)    < from: CT Head No Cont (25 @ 00:28) >  IMPRESSION:    Increased attenuation of the dural reflections and intracranial vessels   likely reflecting residual contrast from recent intravenous contrast   administration.    Redemonstration of bilateral hemispheric subdural collections likely   reflecting subacute to chronic subdural hematomas, grossly stable since   prior. No mass effect or midline shift.    ATTENDING ADDENDUM: The density of the bilateral cerebral convexity and   falcine subdural collections appears uniformly increased when compared to   the previous head CT probably reflecting IV contrast. Recommend   short-term follow-up imaging.    --- End of Report ---    < end of copied text >  < from: CT Head No Cont (25 @ 19:53) >  Motion limited examination.    Predominantly low density bilateral subdural collections are noted likely   reflecting subacute to chronic subdural hematomas. No significant mass   effect or midline shift.    < end of copied text >  < from: CT Chest w/ IV Cont (24 @ 20:14) >  IMPRESSION:    Cholelithiasis.    No evidence of thoracic, abdominal or pelvic visceral injury, laceration,   or hematoma.    No evidence of acute fracture.    --- End of Report ---    < end of copied text >      CARDIOLOGY TESTING  12 Lead ECG:   Systolic  mmHg    Diastolic BP 59 mmHg    Ventricular Rate 65 BPM    Atrial Rate 65 BPM    P-R Interval 138 ms    QRS Duration 156 ms    Q-T Interval 486 ms    QTC Calculation(Bazett) 505 ms    P Axis -27 degrees    R Axis 107 degrees    T Axis -62 degrees    Diagnosis Line Normal sinus rhythm  Rightward axis  Left ventricular hypertrophy with QRS widening and repolarization abnormality  Abnormal ECG    Confirmed by KRISHNA SKAGGS, BHAVNA ([id number]) on 3/19/2025 10:33:31 PM (25 @ 17:52)

## 2025-03-20 NOTE — DIETITIAN INITIAL EVALUATION ADULT - PERTINENT MEDS FT
MEDICATIONS  (STANDING):  acetaminophen   IVPB .. 1000 milliGRAM(s) IV Intermittent once  aMIOdarone    Tablet 100 milliGRAM(s) Oral <User Schedule>  atorvastatin 10 milliGRAM(s) Oral at bedtime  chlorhexidine 2% Cloths 1 Application(s) Topical <User Schedule>  finasteride 5 milliGRAM(s) Oral daily  pantoprazole    Tablet 40 milliGRAM(s) Oral before breakfast  piperacillin/tazobactam IVPB.. 3.375 Gram(s) IV Intermittent every 8 hours  QUEtiapine 12.5 milliGRAM(s) Oral at bedtime  sertraline 50 milliGRAM(s) Oral daily  sodium chloride 0.9%. 1000 milliLiter(s) (70 mL/Hr) IV Continuous <Continuous>  tamsulosin 0.4 milliGRAM(s) Oral at bedtime    MEDICATIONS  (PRN):  acetaminophen     Tablet .. 650 milliGRAM(s) Oral every 6 hours PRN Temp greater or equal to 38C (100.4F), Mild Pain (1 - 3)  ondansetron Injectable 4 milliGRAM(s) IV Push every 6 hours PRN Nausea  senna 2 Tablet(s) Oral at bedtime PRN Constipation

## 2025-03-20 NOTE — CONSULT NOTE ADULT - ASSESSMENT
ASSESSMENT  This is a 84 year old male with PMH of late onset alzheimer's dementia with hallucination, paroxysmal Afib on xarelto , Aortic Stenosis s/p AV replacement; CAD s/p CABG, CHF,  HTN and hyperlipidema presenting with AMS    IMPRESSION  #Metabolic encephalopathy   #Subdural hematomas  #Severe dementia  #Suspected UTI- Hard to differentiate given mental status.   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    RECOMMENDATIONS  -Follow up with blood and urine cultures.   -Follow up with Neurology and Neurosurgery.   -Keep on Zosyn 3.375 gram q 8 hours. (S/D of abx 3/19)   -Off loading to prevent pressure sores and preventive measures to avoid aspiration. GOC    If any questions, please send a message or call on [employer] Teams  Please continue to update ID with any pertinent new laboratory or radiographic findings.    Mohan Benz M.D  Infectious Diseases Attending/   [employer] at [facility]   ASSESSMENT  This is a 84 year old male with PMH of late onset alzheimer's dementia with hallucination, paroxysmal Afib on xarelto , Aortic Stenosis s/p AV replacement; CAD s/p CABG, CHF,  HTN and hyperlipidema presenting with AMS    IMPRESSION  #Metabolic encephalopathy   #Subdural hematomas  #Severe dementia  #Fever  #Suspected UTI- Hard to differentiate given mental status.   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    RECOMMENDATIONS  -Follow up with blood and urine cultures.   -Follow up with Neurology and Neurosurgery.   -Keep on Zosyn 3.375 gram q 8 hours. (S/D of abx 3/19)   -Off loading to prevent pressure sores and preventive measures to avoid aspiration. GOC    If any questions, please send a message or call on [employer] Teams  Please continue to update ID with any pertinent new laboratory or radiographic findings.    Mohan Benz M.D  Infectious Diseases Attending/   [employer] at 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    RECOMMENDATIONS  -Follow up with blood and urine cultures.   -Follow up with Neurology and Neurosurgery.   -Keep on Zosyn 3.375 gram q 8 hours. (S/D of abx 3/19)   -Off loading to prevent pressure sores and preventive measures to avoid aspiration. GOC    If any questions, please send a message or call on [employer] Teams  Please continue to update ID with any pertinent new laboratory or radiographic findings.    Mohan Benz M.D  Infectious Diseases Attending/   [employer] at [facility]

## 2025-03-20 NOTE — CHART NOTE - NSCHARTNOTEFT_GEN_A_CORE
gram negative rober Blood culture.  patient is on zosyn  repeat Blood culture in am  ID to follow up

## 2025-03-21 LAB
ALBUMIN SERPL ELPH-MCNC: 2.9 G/DL — LOW (ref 3.5–5.2)
ALP SERPL-CCNC: 114 U/L — SIGNIFICANT CHANGE UP (ref 30–115)
ALT FLD-CCNC: 24 U/L — SIGNIFICANT CHANGE UP (ref 0–41)
ANION GAP SERPL CALC-SCNC: 10 MMOL/L — SIGNIFICANT CHANGE UP (ref 7–14)
AST SERPL-CCNC: 23 U/L — SIGNIFICANT CHANGE UP (ref 0–41)
BASOPHILS # BLD AUTO: 0.06 K/UL — SIGNIFICANT CHANGE UP (ref 0–0.2)
BASOPHILS NFR BLD AUTO: 0.3 % — SIGNIFICANT CHANGE UP (ref 0–1)
BILIRUB SERPL-MCNC: 0.6 MG/DL — SIGNIFICANT CHANGE UP (ref 0.2–1.2)
BUN SERPL-MCNC: 39 MG/DL — HIGH (ref 10–20)
CALCIUM SERPL-MCNC: 8.4 MG/DL — SIGNIFICANT CHANGE UP (ref 8.4–10.5)
CHLORIDE SERPL-SCNC: 108 MMOL/L — SIGNIFICANT CHANGE UP (ref 98–110)
CO2 SERPL-SCNC: 26 MMOL/L — SIGNIFICANT CHANGE UP (ref 17–32)
CREAT SERPL-MCNC: 1.1 MG/DL — SIGNIFICANT CHANGE UP (ref 0.7–1.5)
EGFR: 66 ML/MIN/1.73M2 — SIGNIFICANT CHANGE UP
EGFR: 66 ML/MIN/1.73M2 — SIGNIFICANT CHANGE UP
EOSINOPHIL # BLD AUTO: 0.02 K/UL — SIGNIFICANT CHANGE UP (ref 0–0.7)
EOSINOPHIL NFR BLD AUTO: 0.1 % — SIGNIFICANT CHANGE UP (ref 0–8)
GLUCOSE SERPL-MCNC: 113 MG/DL — HIGH (ref 70–99)
HCT VFR BLD CALC: 28 % — LOW (ref 42–52)
HGB BLD-MCNC: 9.8 G/DL — LOW (ref 14–18)
IMM GRANULOCYTES NFR BLD AUTO: 0.7 % — HIGH (ref 0.1–0.3)
LYMPHOCYTES # BLD AUTO: 0.77 K/UL — LOW (ref 1.2–3.4)
LYMPHOCYTES # BLD AUTO: 4.1 % — LOW (ref 20.5–51.1)
MCHC RBC-ENTMCNC: 30.8 PG — SIGNIFICANT CHANGE UP (ref 27–31)
MCHC RBC-ENTMCNC: 35 G/DL — SIGNIFICANT CHANGE UP (ref 32–37)
MCV RBC AUTO: 88.1 FL — SIGNIFICANT CHANGE UP (ref 80–94)
MONOCYTES # BLD AUTO: 1.04 K/UL — HIGH (ref 0.1–0.6)
MONOCYTES NFR BLD AUTO: 5.5 % — SIGNIFICANT CHANGE UP (ref 1.7–9.3)
NEUTROPHILS # BLD AUTO: 16.98 K/UL — HIGH (ref 1.4–6.5)
NEUTROPHILS NFR BLD AUTO: 89.3 % — HIGH (ref 42.2–75.2)
NRBC BLD AUTO-RTO: 0 /100 WBCS — SIGNIFICANT CHANGE UP (ref 0–0)
PLATELET # BLD AUTO: 244 K/UL — SIGNIFICANT CHANGE UP (ref 130–400)
PMV BLD: 11.4 FL — HIGH (ref 7.4–10.4)
POTASSIUM SERPL-MCNC: 4.4 MMOL/L — SIGNIFICANT CHANGE UP (ref 3.5–5)
POTASSIUM SERPL-SCNC: 4.4 MMOL/L — SIGNIFICANT CHANGE UP (ref 3.5–5)
PROT SERPL-MCNC: 5.8 G/DL — LOW (ref 6–8)
RBC # BLD: 3.18 M/UL — LOW (ref 4.7–6.1)
RBC # FLD: 16.3 % — HIGH (ref 11.5–14.5)
SODIUM SERPL-SCNC: 144 MMOL/L — SIGNIFICANT CHANGE UP (ref 135–146)
WBC # BLD: 19 K/UL — HIGH (ref 4.8–10.8)
WBC # FLD AUTO: 19 K/UL — HIGH (ref 4.8–10.8)

## 2025-03-21 PROCEDURE — 99233 SBSQ HOSP IP/OBS HIGH 50: CPT

## 2025-03-21 PROCEDURE — 70450 CT HEAD/BRAIN W/O DYE: CPT | Mod: 26

## 2025-03-21 RX ORDER — ACETAMINOPHEN 500 MG/5ML
1000 LIQUID (ML) ORAL ONCE
Refills: 0 | Status: COMPLETED | OUTPATIENT
Start: 2025-03-21 | End: 2025-03-21

## 2025-03-21 RX ORDER — ACETAMINOPHEN 500 MG/5ML
650 LIQUID (ML) ORAL EVERY 6 HOURS
Refills: 0 | Status: DISCONTINUED | OUTPATIENT
Start: 2025-03-21 | End: 2025-03-31

## 2025-03-21 RX ORDER — MEROPENEM 1 G/30ML
1000 INJECTION INTRAVENOUS ONCE
Refills: 0 | Status: COMPLETED | OUTPATIENT
Start: 2025-03-21 | End: 2025-03-21

## 2025-03-21 RX ORDER — KETOROLAC TROMETHAMINE 30 MG/ML
15 INJECTION, SOLUTION INTRAMUSCULAR; INTRAVENOUS ONCE
Refills: 0 | Status: DISCONTINUED | OUTPATIENT
Start: 2025-03-21 | End: 2025-03-21

## 2025-03-21 RX ADMIN — TAMSULOSIN HYDROCHLORIDE 0.4 MILLIGRAM(S): 0.4 CAPSULE ORAL at 21:49

## 2025-03-21 RX ADMIN — Medication 40 MILLIGRAM(S): at 05:29

## 2025-03-21 RX ADMIN — KETOROLAC TROMETHAMINE 15 MILLIGRAM(S): 30 INJECTION, SOLUTION INTRAMUSCULAR; INTRAVENOUS at 09:15

## 2025-03-21 RX ADMIN — FINASTERIDE 5 MILLIGRAM(S): 1 TABLET, FILM COATED ORAL at 11:54

## 2025-03-21 RX ADMIN — SERTRALINE 50 MILLIGRAM(S): 100 TABLET, FILM COATED ORAL at 11:55

## 2025-03-21 RX ADMIN — ATORVASTATIN CALCIUM 10 MILLIGRAM(S): 80 TABLET, FILM COATED ORAL at 21:53

## 2025-03-21 RX ADMIN — Medication 400 MILLIGRAM(S): at 09:15

## 2025-03-21 RX ADMIN — Medication 1000 MILLIGRAM(S): at 09:59

## 2025-03-21 RX ADMIN — QUETIAPINE FUMARATE 12.5 MILLIGRAM(S): 25 TABLET ORAL at 21:49

## 2025-03-21 RX ADMIN — Medication 25 GRAM(S): at 14:47

## 2025-03-21 RX ADMIN — Medication 25 GRAM(S): at 05:29

## 2025-03-21 RX ADMIN — Medication 25 GRAM(S): at 21:40

## 2025-03-21 RX ADMIN — KETOROLAC TROMETHAMINE 15 MILLIGRAM(S): 30 INJECTION, SOLUTION INTRAMUSCULAR; INTRAVENOUS at 09:59

## 2025-03-21 RX ADMIN — MEROPENEM 100 MILLIGRAM(S): 1 INJECTION INTRAVENOUS at 03:03

## 2025-03-21 RX ADMIN — Medication 1 APPLICATION(S): at 05:29

## 2025-03-21 NOTE — PROGRESS NOTE ADULT - ASSESSMENT
85 y/o man with PMH of late onset alzheimer's dementia with hallucination (followed by Dr. Juan Neurologist), paroxysmal Afib on xarelto , Aortic Stenosis s/p AV replacement; CAD s/p CABG, CHF,  HTN and hyperlipidema presenting with    Metabolic Encephalopathy   SIRS present on admission   Now With Gram Neg Rods bacteriemia   Infectious from Acute cystitis and possibly Structural from Hematoma  Head CT: subacute to chronic subdural hematomas. No significant mass effect or midline shift.  Neurosurgery Recommended [place] but Family declined understanding risk of brain herniation and death   Patient on Xarelto for AFIB: continue to hold for now   Repeat Head CT: The density of the bilateral cerebral convexity and falcine subdural collections appears uniformly increased when compared to   the previous head CT probably reflecting IV contrast. Recommend short-term follow-up imaging.  Treat underline UTI and follow-up Ucx, and bcx   Neuro checks Q8, keep NPO pending speech therapy consult   Will Repeat  Head CT in Am for further evaluation >>Pending   ID consult appreciated>>continue Zosyn     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  AORTIC Stenosis s/p AV replacement   Paroxysmal AFIB + HTN = HLD   -BP on Lower side, Hold Antihypertensives and continue IVF   -Continue Amiodarone , Statin, and DASH    BPH: continue Finasteride  and Flomax   Dementia: continue Seroquel and Zoloft with  daily re-orientation     DVT PPX: Xarelto on hold for Hematoma, SCD's for now   GI PPX: PPI   DNI/DNR   Dispo: From Home   Pending Clinical Improvement, acute

## 2025-03-21 NOTE — SWALLOW BEDSIDE ASSESSMENT ADULT - COMMENTS
Known to ST services 6/2024 reccs for puree consistency and mildly thick liquids
Detail Level: Detailed
Known to ST services 6/2024 recs for puree consistency and mildly thick liquids

## 2025-03-21 NOTE — PROGRESS NOTE ADULT - SUBJECTIVE AND OBJECTIVE BOX
Patient is a 85y old  Male who presents with a chief complaint of AMS (21 Mar 2025 09:15)      MEDICATIONS  (STANDING):  aMIOdarone    Tablet 100 milliGRAM(s) Oral <User Schedule>  atorvastatin 10 milliGRAM(s) Oral at bedtime  chlorhexidine 2% Cloths 1 Application(s) Topical <User Schedule>  finasteride 5 milliGRAM(s) Oral daily  pantoprazole    Tablet 40 milliGRAM(s) Oral before breakfast  piperacillin/tazobactam IVPB.. 4.5 Gram(s) IV Intermittent every 8 hours  QUEtiapine 12.5 milliGRAM(s) Oral at bedtime  sertraline 50 milliGRAM(s) Oral daily  sodium chloride 0.9%. 1000 milliLiter(s) (70 mL/Hr) IV Continuous <Continuous>  tamsulosin 0.4 milliGRAM(s) Oral at bedtime    MEDICATIONS  (PRN):  acetaminophen     Tablet .. 650 milliGRAM(s) Oral every 6 hours PRN Temp greater or equal to 38C (100.4F), Mild Pain (1 - 3)  acetaminophen  Suppository .. 650 milliGRAM(s) Rectal every 6 hours PRN Temp greater or equal to 38C (100.4F), Mild Pain (1 - 3)  ondansetron Injectable 4 milliGRAM(s) IV Push every 6 hours PRN Nausea  senna 2 Tablet(s) Oral at bedtime PRN Constipation        PHYSICAL EXAM:  Vital Signs Last 24 Hrs  T(C): 38.6 (21 Mar 2025 05:17), Max: 38.9 (21 Mar 2025 03:05)  T(F): 101.4 (21 Mar 2025 05:17), Max: 102 (21 Mar 2025 03:05)  HR: 63 (21 Mar 2025 05:17) (57 - 73)  BP: 160/62 (21 Mar 2025 05:17) (124/56 - 160/62)  BP(mean): 90 (21 Mar 2025 01:33) (90 - 90)  RR: 18 (21 Mar 2025 05:17) (18 - 20)  SpO2: 98% (21 Mar 2025 05:17) (93% - 98%)    Parameters below as of 21 Mar 2025 05:17  Patient On (Oxygen Delivery Method): room air      GENERAL: No acute distress, multiple intermittent Jerks and tremors (baseline)   HEAD:  Atraumatic, Normocephalic  EYES: conjunctiva and sclera clear  NECK: Supple,, no JVD  CHEST/LUNG: CTAB; No wheezes  HEART: Regular rate and rhythm; No murmur  ABDOMEN: Soft, non-tender, non-distended  EXTREMITIES:  , No clubbing, cyanosis, or edema  NEUROLOGY: A&O x 0,  no focal deficits      LABS:                        9.8    19.00 )-----------( 244      ( 21 Mar 2025 07:04 )             28.0     03-21    144  |  108  |  39[H]  ----------------------------<  113[H]  4.4   |  26  |  1.1    Ca    8.4      21 Mar 2025 07:04    TPro  5.8[L]  /  Alb  2.9[L]  /  TBili  0.6  /  DBili  x   /  AST  23  /  ALT  24  /  AlkPhos  114  03-21    PT/INR - ( 19 Mar 2025 18:30 )   PT: 20.20 sec;   INR: 1.69 ratio         PTT - ( 19 Mar 2025 18:30 )  PTT:36.4 sec      Urinalysis Basic - ( 21 Mar 2025 07:04 )    Color: x / Appearance: x / SG: x / pH: x  Gluc: 113 mg/dL / Ketone: x  / Bili: x / Urobili: x   Blood: x / Protein: x / Nitrite: x   Leuk Esterase: x / RBC: x / WBC x   Sq Epi: x / Non Sq Epi: x / Bacteria: x        Urinalysis with Rflx Culture (collected 19 Mar 2025 19:35)    Culture - Urine (collected 19 Mar 2025 19:35)  Source: Catheterized None  Preliminary Report (21 Mar 2025 08:27):    >100,000 CFU/ml Gram Negative Rods    Culture - Blood (collected 19 Mar 2025 18:30)  Source: Blood Blood-Peripheral  Gram Stain (20 Mar 2025 18:12):    Growth in aerobic bottle: Gram Negative Rods  Preliminary Report (20 Mar 2025 18:17):    Growth in aerobic bottle: Gram Negative Rods    Direct identification is available within approximately 3-5    hours either by Blood Panel Multiplexed PCR or Direct    MALDI-TOF. Details: [email or web address]  Organism: Blood Culture PCR (20 Mar 2025 20:15)  Organism: Blood Culture PCR (20 Mar 2025 20:15)    Culture - Blood (collected 19 Mar 2025 18:30)  Source: Blood Blood-Peripheral  Gram Stain (20 Mar 2025 19:51):    Growth in aerobic bottle: Gram Negative Rods  Preliminary Report (20 Mar 2025 19:51):    Growth in aerobic bottle: Gram Negative Rods

## 2025-03-21 NOTE — PROGRESS NOTE ADULT - ASSESSMENT
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    RECOMMENDATIONS  -Follow up with blood and urine cultures.   -Follow up with Neurology and Neurosurgery.   -Keep on Zosyn 4.5 gram q 8 hours. (S/D of abx 3/19)   -Off loading to prevent pressure sores and preventive measures to avoid aspiration. GOC    If any questions, please send a message or call on [employer] Teams  Please continue to update ID with any pertinent new laboratory or radiographic findings.    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    RECOMMENDATIONS  -Follow up with blood and urine cultures.   -Follow up with Neurology and Neurosurgery.   -Keep on Zosyn 4.5 gram q 8 hours. (S/D of abx 3/19)   -Off loading to prevent pressure sores and preventive measures to avoid aspiration. GOC    If any questions, please send a message or call on [employer] Teams  Please continue to update ID with any pertinent new laboratory or radiographic findings.    Mohan Benz M.D  Infectious Diseases Attending/   [employer] at [facility]

## 2025-03-21 NOTE — PROGRESS NOTE ADULT - SUBJECTIVE AND OBJECTIVE BOX
CISCO TONJA  85y, Male    LOS  2d    INTERVAL EVENTS/HPI  - T(F): , Max: 102 (03-21-25 @ 03:05)  - WBC Count: 19.00 (03-21-25 @ 07:04)  WBC Count: 12.94 (03-19-25 @ 18:30)  - Creatinine: 1.1 (03-21-25 @ 07:04)  Creatinine: 1.1 (03-19-25 @ 18:30)    REVIEW OF SYSTEMS: cannot obtain further history from the patient secondary to altered mental status or sedation      Prior hospital charts reviewed [Yes]  Primary team notes reviewed [Yes]  Other consultant notes reviewed [Yes]    ANTIMICROBIALS:   piperacillin/tazobactam IVPB.. 4.5 every 8 hours      OTHER MEDS: MEDICATIONS  (STANDING):  acetaminophen     Tablet .. 650 every 6 hours PRN  acetaminophen  Suppository .. 650 every 6 hours PRN  aMIOdarone    Tablet 100 <User Schedule>  atorvastatin 10 at bedtime  finasteride 5 daily  ondansetron Injectable 4 every 6 hours PRN  pantoprazole    Tablet 40 before breakfast  QUEtiapine 12.5 at bedtime  senna 2 at bedtime PRN  sertraline 50 daily  tamsulosin 0.4 at bedtime      Vital Signs Last 24 Hrs  T(F): 101.4 (03-21-25 @ 05:17), Max: 103.8 (03-19-25 @ 23:57)    Vital Signs Last 24 Hrs  HR: 63 (03-21-25 @ 05:17) (57 - 73)  BP: 160/62 (03-21-25 @ 05:17) (124/56 - 160/62)  RR: 18 (03-21-25 @ 05:17)  SpO2: 98% (03-21-25 @ 05:17) (93% - 98%)  Wt(kg): --    EXAM:  GENERAL: Somnolent.  HEAD: No head lesions  NECK: Supple  CHEST/LUNG: Shallow breath sounds.   HEART: S1 S2  ABDOMEN: Soft, nontender.  EXTREMITIES: No clubbing  NERVOUS SYSTEM: Somnolent.   MSK: No joint erythema, swelling or pain  SKIN: No rashes or lesions, no superficial thrombophlebitis      Labs:                        9.8    19.00 )-----------( 244      ( 21 Mar 2025 07:04 )             28.0     03-21    144  |  108  |  39[H]  ----------------------------<  113[H]  4.4   |  26  |  1.1    Ca    8.4      21 Mar 2025 07:04    TPro  5.8[L]  /  Alb  2.9[L]  /  TBili  0.6  /  DBili  x   /  AST  23  /  ALT  24  /  AlkPhos  114  03-21      WBC Trend:  WBC Count: 19.00 (03-21-25 @ 07:04)  WBC Count: 12.94 (03-19-25 @ 18:30)      Creatine Trend:  Creatinine: 1.1 (03-21)  Creatinine: 1.1 (03-19)      Liver Biochemical Testing Trend:  Alanine Aminotransferase (ALT/SGPT): 24 (03-21)  Alanine Aminotransferase (ALT/SGPT): 31 (03-19)  Alanine Aminotransferase (ALT/SGPT): 12 (06-26)  Alanine Aminotransferase (ALT/SGPT): 13 (06-25)  Alanine Aminotransferase (ALT/SGPT): 12 (06-24)  Aspartate Aminotransferase (AST/SGOT): 23 (03-21-25 @ 07:04)  Aspartate Aminotransferase (AST/SGOT): 28 (03-19-25 @ 18:30)  Aspartate Aminotransferase (AST/SGOT): 22 (06-26-24 @ 07:17)  Aspartate Aminotransferase (AST/SGOT): 19 (06-25-24 @ 08:12)  Aspartate Aminotransferase (AST/SGOT): 21 (06-24-24 @ 18:40)  Bilirubin Total: 0.6 (03-21)  Bilirubin Total: 0.5 (03-19)  Bilirubin Total: 0.4 (06-26)  Bilirubin Total: 0.5 (06-25)  Bilirubin Total: 0.8 (06-24)      Trend LDH      Urinalysis Basic - ( 21 Mar 2025 07:04 )    Color: x / Appearance: x / SG: x / pH: x  Gluc: 113 mg/dL / Ketone: x  / Bili: x / Urobili: x   Blood: x / Protein: x / Nitrite: x   Leuk Esterase: x / RBC: x / WBC x   Sq Epi: x / Non Sq Epi: x / Bacteria: x        MICROBIOLOGY:    Male    Urinalysis with Rflx Culture (collected 19 Mar 2025 19:35)    Culture - Urine (collected 19 Mar 2025 19:35)  Source: Catheterized None  Preliminary Report:    >100,000 CFU/ml Gram Negative Rods    Culture - Blood (collected 19 Mar 2025 18:30)  Source: Blood Blood-Peripheral  Preliminary Report:    Growth in aerobic bottle: Gram Negative Rods    Direct identification is available within approximately 3-5    hours either by Blood Panel Multiplexed PCR or Direct    MALDI-TOF. Details: [email or web address]  Organism: Blood Culture PCR  Organism: Blood Culture PCR    Sensitivities:      Method Type: PCR      -  Pseudomonas aeruginosa: Detec    Culture - Blood (collected 19 Mar 2025 18:30)  Source: Blood Blood-Peripheral  Preliminary Report:    Growth in aerobic bottle: Gram Negative Rods    Lactate, Blood: 1.0 (03-19 @ 21:30)  Lactate, Blood: 2.3 (03-19 @ 18:30)        RADIOLOGY & ADDITIONAL TESTS:  I have personally reviewed the relevant images.   CXR      CT  CT Abdomen and Pelvis w/ IV Cont:   ACC: [id number] EXAM:  CT ABDOMEN AND PELVIS IC   ORDERED BY: BETSY METZGER     PROCEDURE DATE:  03/19/2025          INTERPRETATION:  CLINICAL INFORMATION: Fever, abdominal pain    COMPARISON: CT ABDOMEN/PELVIS 3/19/2025.    CONTRAST/COMPLICATIONS:  IV Contrast: Omnipaque 350  90 cc administered   10 cc discarded  Oral Contrast: NONE    PROCEDURE:  CT of the Abdomen and Pelvis was performed.  Sagittal and coronal reformats were performed.    FINDINGS:  LOWER CHEST: Bibasilar atelectasis. Poststernotomy. Cardiomegaly    LIVER: Within normal limits.  BILE DUCTS: Normal caliber.  GALLBLADDER: Cholelithiasis.  SPLEEN: Within normal limits.  PANCREAS: Within normal limits.  ADRENALS: Within normal limits.  KIDNEYS/URETERS: No renal stones or hydronephrosis.    BLADDER: Mild urinary bladder wall thickening  REPRODUCTIVE ORGANS: Unremarkable at CT.    BOWEL: Stool impaction the rectum measuring up to 9 cm. Mild degree of   perirectal fat stranding. Additional desiccated stool within the colon.  PERITONEUM/RETROPERITONEUM: Within normal limits.  VESSELS: Atherosclerotic changes with extensive visceral vascular   calcifications. Circumaortic left renal vein  LYMPH NODES: No lymphadenopathy.  ABDOMINAL WALL: Small fat-containing umbilical hernia  BONES: Osteopenia. Subacute/chronic left L2 transverse process deformity    IMPRESSION:  Stool impaction the rectum measuring up to 9 cm with mild to moderate   degree of perirectal fat stranding    --- End of Report ---            SCARLET SERVIN MD; Attending Radiologist  This document has been electronically signed. Mar 19 2025  8:22PM (03-19-25 @ 20:01)        WEIGHT  Weight (kg): 66 (03-19-25 @ 23:57)  Creatinine: 1.1 mg/dL (03-21-25 @ 07:04)      All available historical records have been reviewed

## 2025-03-21 NOTE — SWALLOW BEDSIDE ASSESSMENT ADULT - ASR SWALLOW REFERRAL
family reports decreased PO intake, pt drinks Ensure at home/Registered Dietitian
family reports decreased PO intake, pt drinks Ensure at home/Registered Dietitian

## 2025-03-22 LAB
-  AZTREONAM: SIGNIFICANT CHANGE UP
-  CEFEPIME: SIGNIFICANT CHANGE UP
-  CEFTAZIDIME: SIGNIFICANT CHANGE UP
-  CIPROFLOXACIN: SIGNIFICANT CHANGE UP
-  IMIPENEM: SIGNIFICANT CHANGE UP
-  LEVOFLOXACIN: SIGNIFICANT CHANGE UP
-  MEROPENEM: SIGNIFICANT CHANGE UP
-  PIPERACILLIN/TAZOBACTAM: SIGNIFICANT CHANGE UP
ALBUMIN SERPL ELPH-MCNC: 2.9 G/DL — LOW (ref 3.5–5.2)
ALP SERPL-CCNC: 125 U/L — HIGH (ref 30–115)
ALT FLD-CCNC: 21 U/L — SIGNIFICANT CHANGE UP (ref 0–41)
ANION GAP SERPL CALC-SCNC: 9 MMOL/L — SIGNIFICANT CHANGE UP (ref 7–14)
AST SERPL-CCNC: 22 U/L — SIGNIFICANT CHANGE UP (ref 0–41)
BASOPHILS # BLD AUTO: 0.05 K/UL — SIGNIFICANT CHANGE UP (ref 0–0.2)
BASOPHILS NFR BLD AUTO: 0.3 % — SIGNIFICANT CHANGE UP (ref 0–1)
BILIRUB SERPL-MCNC: 0.4 MG/DL — SIGNIFICANT CHANGE UP (ref 0.2–1.2)
BUN SERPL-MCNC: 49 MG/DL — HIGH (ref 10–20)
CALCIUM SERPL-MCNC: 8.8 MG/DL — SIGNIFICANT CHANGE UP (ref 8.4–10.5)
CHLORIDE SERPL-SCNC: 114 MMOL/L — HIGH (ref 98–110)
CO2 SERPL-SCNC: 26 MMOL/L — SIGNIFICANT CHANGE UP (ref 17–32)
CREAT SERPL-MCNC: 1.1 MG/DL — SIGNIFICANT CHANGE UP (ref 0.7–1.5)
CULTURE RESULTS: ABNORMAL
CULTURE RESULTS: ABNORMAL
EGFR: 66 ML/MIN/1.73M2 — SIGNIFICANT CHANGE UP
EGFR: 66 ML/MIN/1.73M2 — SIGNIFICANT CHANGE UP
EOSINOPHIL # BLD AUTO: 0.12 K/UL — SIGNIFICANT CHANGE UP (ref 0–0.7)
EOSINOPHIL NFR BLD AUTO: 0.8 % — SIGNIFICANT CHANGE UP (ref 0–8)
GLUCOSE SERPL-MCNC: 126 MG/DL — HIGH (ref 70–99)
GRAM STN FLD: ABNORMAL
HCT VFR BLD CALC: 27.9 % — LOW (ref 42–52)
HGB BLD-MCNC: 9.8 G/DL — LOW (ref 14–18)
IMM GRANULOCYTES NFR BLD AUTO: 0.7 % — HIGH (ref 0.1–0.3)
LYMPHOCYTES # BLD AUTO: 1.05 K/UL — LOW (ref 1.2–3.4)
LYMPHOCYTES # BLD AUTO: 7.1 % — LOW (ref 20.5–51.1)
MCHC RBC-ENTMCNC: 30.7 PG — SIGNIFICANT CHANGE UP (ref 27–31)
MCHC RBC-ENTMCNC: 35.1 G/DL — SIGNIFICANT CHANGE UP (ref 32–37)
MCV RBC AUTO: 87.5 FL — SIGNIFICANT CHANGE UP (ref 80–94)
METHOD TYPE: SIGNIFICANT CHANGE UP
MONOCYTES # BLD AUTO: 1.15 K/UL — HIGH (ref 0.1–0.6)
MONOCYTES NFR BLD AUTO: 7.7 % — SIGNIFICANT CHANGE UP (ref 1.7–9.3)
NEUTROPHILS # BLD AUTO: 12.41 K/UL — HIGH (ref 1.4–6.5)
NEUTROPHILS NFR BLD AUTO: 83.4 % — HIGH (ref 42.2–75.2)
NRBC BLD AUTO-RTO: 0 /100 WBCS — SIGNIFICANT CHANGE UP (ref 0–0)
ORGANISM # SPEC MICROSCOPIC CNT: ABNORMAL
ORGANISM # SPEC MICROSCOPIC CNT: ABNORMAL
ORGANISM # SPEC MICROSCOPIC CNT: SIGNIFICANT CHANGE UP
PLATELET # BLD AUTO: 254 K/UL — SIGNIFICANT CHANGE UP (ref 130–400)
PMV BLD: 11.9 FL — HIGH (ref 7.4–10.4)
POTASSIUM SERPL-MCNC: 4.3 MMOL/L — SIGNIFICANT CHANGE UP (ref 3.5–5)
POTASSIUM SERPL-SCNC: 4.3 MMOL/L — SIGNIFICANT CHANGE UP (ref 3.5–5)
PROT SERPL-MCNC: 5.8 G/DL — LOW (ref 6–8)
RBC # BLD: 3.19 M/UL — LOW (ref 4.7–6.1)
RBC # FLD: 16.5 % — HIGH (ref 11.5–14.5)
SODIUM SERPL-SCNC: 149 MMOL/L — HIGH (ref 135–146)
SPECIMEN SOURCE: SIGNIFICANT CHANGE UP
WBC # BLD: 14.89 K/UL — HIGH (ref 4.8–10.8)
WBC # FLD AUTO: 14.89 K/UL — HIGH (ref 4.8–10.8)

## 2025-03-22 PROCEDURE — 99232 SBSQ HOSP IP/OBS MODERATE 35: CPT

## 2025-03-22 PROCEDURE — 73030 X-RAY EXAM OF SHOULDER: CPT | Mod: 26,LT

## 2025-03-22 RX ORDER — IBUPROFEN 200 MG
400 TABLET ORAL ONCE
Refills: 0 | Status: COMPLETED | OUTPATIENT
Start: 2025-03-22 | End: 2025-03-22

## 2025-03-22 RX ADMIN — Medication 25 GRAM(S): at 05:39

## 2025-03-22 RX ADMIN — SERTRALINE 50 MILLIGRAM(S): 100 TABLET, FILM COATED ORAL at 11:11

## 2025-03-22 RX ADMIN — Medication 400 MILLIGRAM(S): at 18:55

## 2025-03-22 RX ADMIN — Medication 25 GRAM(S): at 13:42

## 2025-03-22 RX ADMIN — ATORVASTATIN CALCIUM 10 MILLIGRAM(S): 80 TABLET, FILM COATED ORAL at 21:47

## 2025-03-22 RX ADMIN — QUETIAPINE FUMARATE 12.5 MILLIGRAM(S): 25 TABLET ORAL at 21:47

## 2025-03-22 RX ADMIN — FINASTERIDE 5 MILLIGRAM(S): 1 TABLET, FILM COATED ORAL at 11:12

## 2025-03-22 RX ADMIN — Medication 25 GRAM(S): at 21:45

## 2025-03-22 RX ADMIN — TAMSULOSIN HYDROCHLORIDE 0.4 MILLIGRAM(S): 0.4 CAPSULE ORAL at 21:46

## 2025-03-22 RX ADMIN — AMIODARONE HYDROCHLORIDE 100 MILLIGRAM(S): 50 INJECTION, SOLUTION INTRAVENOUS at 09:08

## 2025-03-22 RX ADMIN — Medication 1 APPLICATION(S): at 05:39

## 2025-03-22 RX ADMIN — Medication 650 MILLIGRAM(S): at 14:37

## 2025-03-22 RX ADMIN — Medication 650 MILLIGRAM(S): at 14:07

## 2025-03-22 NOTE — CHART NOTE - NSCHARTNOTEFT_GEN_A_CORE
As per family noticed today pt with left shoulder swelling and pain worse with movement, denies fall or trauma to area  EXAM: EXT: +left shoulder area of mild edema, no pain on palpation  A/P:  Xray of left shoulder  fup results  monitor vss  monitor pt  case d/w attending

## 2025-03-22 NOTE — CHART NOTE - NSCHARTNOTEFT_GEN_A_CORE
Called regarding patient's findings on CTH.     < from: CT Head No Cont (03.21.25 @ 10:06) >    IMPRESSION:  Motion degraded examination.  Essentially unchanged appearance of bilateral mixed density demonstrated  subdural collections likely reflecting subacute to chronic subdural   hematomas versus hygromas.  < end of copied text >    Imaging reviewed by Neurosurgeon Dr Epps. Per conversation with attg at [facility], pt is severely demented and cannot participate in exam/decisions.  Would recommend NeuroIR consult for possible MMA embolization.  If NeuroIR deems patient a candidate and if patient's family wants intervention, [place] be [place] for possible MMA embolization after consultation with NeuroIR team. There is no Neurosurgical intervention being offered for this due to chronicity of findings. Pt should NOT be resumed on AC for Afib for 4 weeks. At that time, pt should have repeat CTH and follow up with Neurosurgery as an outpatient, for further recommendations regarding AC continuation. Above d/w Attg. Called regarding patient's findings on CTH.     < from: CT Head No Cont (03.21.25 @ 10:06) >    IMPRESSION:  Motion degraded examination.  Essentially unchanged appearance of bilateral mixed density demonstrated  subdural collections likely reflecting subacute to chronic subdural   hematomas versus hygromas.  < end of copied text >    Imaging reviewed by Neurosurgeon Dr Epps. Per conversation with attg at [facility], pt is severely demented and cannot participate in exam/decisions.  Would recommend NeuroIR consult for possible MMA embolization.  If NeuroIR deems patient a candidate and if patient's family wants intervention, pt can be transferred [place] for possible MMA embolization after consultation with NeuroIR team. There is no need for acute neurosurgical intervention at this time. Pt should NOT be resumed on AC for Afib for 4 weeks. At that time, pt should have repeat CTH and follow up with Neurosurgery as an outpatient, f/u 4 weeks as outpatient with repeat head CT prior to appt for further recommendations regarding AC continuation. Above d/w Attg.

## 2025-03-22 NOTE — PROGRESS NOTE ADULT - SUBJECTIVE AND OBJECTIVE BOX
TONJA PECK 85y Male  MRN#: [id number]   Hospital Day: 3d    SUBJECTIVE  Patient is a 85y old Male who presents with a chief complaint of AMS (21 Mar 2025 10:22)  Currently admitted to medicine with the primary diagnosis of Acute UTI      INTERVAL HPI AND OVERNIGHT EVENTS:  Patient was examined and seen at bedside. This morning he is resting comfortably in bed and reports no issues or overnight events.    REVIEW OF SYMPTOMS:  CONSTITUTIONAL: No weakness, fevers or chills; No headaches  EYES: No visual changes, eye pain, or discharge  ENT: No vertigo; No ear pain or change in hearing; No sore throat or difficulty swallowing  NECK: No pain or stiffness  RESPIRATORY: No cough, wheezing, or hemoptysis; No shortness of breath  CARDIOVASCULAR: No chest pain or palpitations  GASTROINTESTINAL: No abdominal or epigastric pain; No nausea, vomiting, or hematemesis; No diarrhea or constipation; No melena or hematochezia  GENITOURINARY: No dysuria, frequency or hematuria  MUSCULOSKELETAL: No joint pain, no muscle pain, no weakness  NEUROLOGICAL: No numbness or weakness  SKIN: No itching or rashes    OBJECTIVE  PAST MEDICAL & SURGICAL HISTORY  Afib    CHF (congestive heart failure)    Hypertension    S/P CABG (coronary artery bypass graft)      ALLERGIES:  No Known Allergies    MEDICATIONS:  STANDING MEDICATIONS  aMIOdarone    Tablet 100 milliGRAM(s) Oral <User Schedule>  atorvastatin 10 milliGRAM(s) Oral at bedtime  chlorhexidine 2% Cloths 1 Application(s) Topical <User Schedule>  finasteride 5 milliGRAM(s) Oral daily  pantoprazole    Tablet 40 milliGRAM(s) Oral before breakfast  piperacillin/tazobactam IVPB.. 4.5 Gram(s) IV Intermittent every 8 hours  QUEtiapine 12.5 milliGRAM(s) Oral at bedtime  sertraline 50 milliGRAM(s) Oral daily  sodium chloride 0.9%. 1000 milliLiter(s) IV Continuous <Continuous>  tamsulosin 0.4 milliGRAM(s) Oral at bedtime    PRN MEDICATIONS  acetaminophen     Tablet .. 650 milliGRAM(s) Oral every 6 hours PRN  acetaminophen  Suppository .. 650 milliGRAM(s) Rectal every 6 hours PRN  ondansetron Injectable 4 milliGRAM(s) IV Push every 6 hours PRN  senna 2 Tablet(s) Oral at bedtime PRN      VITAL SIGNS: Last 24 Hours  T(C): 37.7 (21 Mar 2025 23:52), Max: 37.9 (21 Mar 2025 21:06)  T(F): 99.9 (21 Mar 2025 23:52), Max: 100.2 (21 Mar 2025 21:06)  HR: 63 (21 Mar 2025 23:52) (56 - 65)  BP: 160/71 (21 Mar 2025 23:52) (160/71 - 161/75)  BP(mean): --  RR: 18 (21 Mar 2025 14:36) (18 - 18)  SpO2: 100% (21 Mar 2025 21:06) (95% - 100%)    LABS:                        9.8    14.89 )-----------( 254      ( 22 Mar 2025 04:30 )             27.9     03-22    149[H]  |  114[H]  |  49[H]  ----------------------------<  126[H]  4.3   |  26  |  1.1    Ca    8.8      22 Mar 2025 04:30    TPro  5.8[L]  /  Alb  2.9[L]  /  TBili  0.4  /  DBili  x   /  AST  22  /  ALT  21  /  AlkPhos  125[H]  03-22      Urinalysis Basic - ( 22 Mar 2025 04:30 )    Color: x / Appearance: x / SG: x / pH: x  Gluc: 126 mg/dL / Ketone: x  / Bili: x / Urobili: x   Blood: x / Protein: x / Nitrite: x   Leuk Esterase: x / RBC: x / WBC x   Sq Epi: x / Non Sq Epi: x / Bacteria: x            Culture - Blood (collected 21 Mar 2025 07:04)  Source: Blood None  Gram Stain (22 Mar 2025 12:15):    Growth in aerobic bottle: Gram Negative Rods  Preliminary Report (22 Mar 2025 12:15):    Growth in aerobic bottle: Gram Negative Rods    Culture - Urine (collected 19 Mar 2025 19:35)  Source: Catheterized None  Preliminary Report (22 Mar 2025 07:52):    >100,000 CFU/ml Pseudomonas aeruginosa    Susceptibility to follow.    Urinalysis with Rflx Culture (collected 19 Mar 2025 19:35)    Culture - Blood (collected 19 Mar 2025 18:30)  Source: Blood Blood-Peripheral  Gram Stain (20 Mar 2025 18:12):    Growth in aerobic bottle: Gram Negative Rods  Final Report (22 Mar 2025 11:29):    Growth in aerobic bottle: Pseudomonas aeruginosa    Direct identification is available within approximately 3-5    hours either by Blood Panel Multiplexed PCR or Direct    MALDI-TOF. Details: [email or web address]  Organism: Blood Culture PCR  Pseudomonas aeruginosa (22 Mar 2025 11:29)  Organism: Pseudomonas aeruginosa (22 Mar 2025 11:29)  Organism: Blood Culture PCR (22 Mar 2025 11:29)    Culture - Blood (collected 19 Mar 2025 18:30)  Source: Blood Blood-Peripheral  Gram Stain (20 Mar 2025 19:51):    Growth in aerobic bottle: Gram Negative Rods  Final Report (22 Mar 2025 11:33):    Growth in aerobic bottle: Pseudomonas aeruginosa    See previous culture [id number]          RADIOLOGY:      PHYSICAL EXAM:  CONSTITUTIONAL: No acute distress, well-developed, well-groomed, AAOx3  HEAD: Atraumatic, normocephalic  EYES: EOM intact, PERRLA, conjunctiva and sclera clear  ENT: Supple, no masses, no thyromegaly, no bruits, no JVD; moist mucous membranes  PULMONARY: Clear to auscultation bilaterally; no wheezes, rales, or rhonchi  CARDIOVASCULAR: Regular rate and rhythm; no murmurs, rubs, or gallops  GASTROINTESTINAL: Soft, non-tender, non-distended; bowel sounds present  MUSCULOSKELETAL: 2+ peripheral pulses; no clubbing, no cyanosis, no edema  NEUROLOGY: non-focal  SKIN: No rashes or lesions; warm and dry     TONJA PECK 85y Male  MRN#: [id number]   Hospital Day: 3d    SUBJECTIVE  Patient is a 85y old Male who presents with a chief complaint of AMS (21 Mar 2025 10:22)  Currently admitted to medicine with the primary diagnosis of Acute UTI      INTERVAL HPI AND OVERNIGHT EVENTS:  Patient was examined and seen at bedside. This morning he is sleeping soundly in bed after being washed and eating breakfast w aid       OBJECTIVE  PAST MEDICAL & SURGICAL HISTORY  Afib    CHF (congestive heart failure)    Hypertension    S/P CABG (coronary artery bypass graft)      ALLERGIES:  No Known Allergies    MEDICATIONS:  STANDING MEDICATIONS  aMIOdarone    Tablet 100 milliGRAM(s) Oral <User Schedule>  atorvastatin 10 milliGRAM(s) Oral at bedtime  chlorhexidine 2% Cloths 1 Application(s) Topical <User Schedule>  finasteride 5 milliGRAM(s) Oral daily  pantoprazole    Tablet 40 milliGRAM(s) Oral before breakfast  piperacillin/tazobactam IVPB.. 4.5 Gram(s) IV Intermittent every 8 hours  QUEtiapine 12.5 milliGRAM(s) Oral at bedtime  sertraline 50 milliGRAM(s) Oral daily  sodium chloride 0.9%. 1000 milliLiter(s) IV Continuous <Continuous>  tamsulosin 0.4 milliGRAM(s) Oral at bedtime    PRN MEDICATIONS  acetaminophen     Tablet .. 650 milliGRAM(s) Oral every 6 hours PRN  acetaminophen  Suppository .. 650 milliGRAM(s) Rectal every 6 hours PRN  ondansetron Injectable 4 milliGRAM(s) IV Push every 6 hours PRN  senna 2 Tablet(s) Oral at bedtime PRN      VITAL SIGNS: Last 24 Hours  T(C): 37.7 (21 Mar 2025 23:52), Max: 37.9 (21 Mar 2025 21:06)  T(F): 99.9 (21 Mar 2025 23:52), Max: 100.2 (21 Mar 2025 21:06)  HR: 63 (21 Mar 2025 23:52) (56 - 65)  BP: 160/71 (21 Mar 2025 23:52) (160/71 - 161/75)  BP(mean): --  RR: 18 (21 Mar 2025 14:36) (18 - 18)  SpO2: 100% (21 Mar 2025 21:06) (95% - 100%)    LABS:                        9.8    14.89 )-----------( 254      ( 22 Mar 2025 04:30 )             27.9     03-22    149[H]  |  114[H]  |  49[H]  ----------------------------<  126[H]  4.3   |  26  |  1.1    Ca    8.8      22 Mar 2025 04:30    TPro  5.8[L]  /  Alb  2.9[L]  /  TBili  0.4  /  DBili  x   /  AST  22  /  ALT  21  /  AlkPhos  125[H]  03-22      Urinalysis Basic - ( 22 Mar 2025 04:30 )    Color: x / Appearance: x / SG: x / pH: x  Gluc: 126 mg/dL / Ketone: x  / Bili: x / Urobili: x   Blood: x / Protein: x / Nitrite: x   Leuk Esterase: x / RBC: x / WBC x   Sq Epi: x / Non Sq Epi: x / Bacteria: x            Culture - Blood (collected 21 Mar 2025 07:04)  Source: Blood None  Gram Stain (22 Mar 2025 12:15):    Growth in aerobic bottle: Gram Negative Rods  Preliminary Report (22 Mar 2025 12:15):    Growth in aerobic bottle: Gram Negative Rods    Culture - Urine (collected 19 Mar 2025 19:35)  Source: Catheterized None  Preliminary Report (22 Mar 2025 07:52):    >100,000 CFU/ml Pseudomonas aeruginosa    Susceptibility to follow.    Urinalysis with Rflx Culture (collected 19 Mar 2025 19:35)    Culture - Blood (collected 19 Mar 2025 18:30)  Source: Blood Blood-Peripheral  Gram Stain (20 Mar 2025 18:12):    Growth in aerobic bottle: Gram Negative Rods  Final Report (22 Mar 2025 11:29):    Growth in aerobic bottle: Pseudomonas aeruginosa    Direct identification is available within approximately 3-5    hours either by Blood Panel Multiplexed PCR or Direct    MALDI-TOF. Details: [email or web address]  Organism: Blood Culture PCR  Pseudomonas aeruginosa (22 Mar 2025 11:29)  Organism: Pseudomonas aeruginosa (22 Mar 2025 11:29)  Organism: Blood Culture PCR (22 Mar 2025 11:29)    Culture - Blood (collected 19 Mar 2025 18:30)  Source: Blood Blood-Peripheral  Gram Stain (20 Mar 2025 19:51):    Growth in aerobic bottle: Gram Negative Rods  Final Report (22 Mar 2025 11:33):    Growth in aerobic bottle: Pseudomonas aeruginosa    See previous culture [id number]          RADIOLOGY:  IMPRESSION:  Motion degraded examination.    Essentially unchanged appearance of bilateral mixed density demonstrated  subdural collections likely reflecting subacute to chronic subdural   hematomas versus hygromas.      PHYSICAL EXAM:  CONSTITUTIONAL: No acute distress, well-groomed, snoring, shouts and bats hands away when woken   HEAD: Atraumatic, normocephalic  EYES: conjunctiva and sclera clear  ENT: no masses, no JVD; moist mucous membranes  PULMONARY:  no wheezes, rales, Rhonchi +   CARDIOVASCULAR: Regular rate and rhythm; no murmurs, rubs, or gallops  GASTROINTESTINAL: Soft, non-tender, non-distended; bowel sounds present  MUSCULOSKELETAL: 2+ peripheral pulses; no clubbing, no cyanosis, no edema  NEUROLOGY: no tremor, agitated when woken   SKIN: No rashes or lesions; warm and dry

## 2025-03-22 NOTE — PROGRESS NOTE ADULT - ASSESSMENT
Mr Kell is a 85 YOM w a PMH of alzheimer's dementia with hallucination (followed by Dr. Juan Neurologist), HFpEF, HTN, paroxysmal Afib on xarelto , Aortic Stenosis s/p AV replacement; CAD s/p CABG and hyperlipidema presenting with worsening confusion    Metabolic Encephalopathy   pseudominas/ Gram Neg Rods bacteriemia   SIRS present on admission   Infectious from Acute cystitis and possibly Structural from Hematoma  Head CT: subacute to chronic subdural hematomas. No significant mass effect or midline shift.  Neurosurgery Recommended Transfer to [place] but Family declined understanding risk of brain herniation and death   Patient on Xarelto for AFIB: continue to hold for now   Repeat Head CT: The density of the bilateral cerebral convexity and falcine subdural collections appears uniformly increased when compared to   the previous head CT probably reflecting IV contrast. Recommend short-term follow-up imaging.  Treat underline UTI and follow-up Ucx, and bcx   Neuro checks Q8, keep NPO pending speech therapy consult   Will Repeat  Head CT in Am for further evaluation >>Pending   ID consult appreciated>>continue Zosyn     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  AORTIC Stenosis s/p AV replacement   Paroxysmal AFIB + HTN = HLD   -BP on Lower side, Hold Antihypertensives and continue IVF   -Continue Amiodarone , Statin, and DASH    BPH: continue Finasteride  and Flomax   Dementia: continue Seroquel and Zoloft with  daily re-orientation     DVT PPX: Xarelto on hold for Hematoma, SCD's for now   GI PPX: PPI   DNI/DNR   Dispo: From Home   Pending Clinical Improvement, acute                   #Misc  - DVT Prophylaxis:  - GI Prophylaxis:  - Diet:  - Activity:  - IV Fluids:  - Code Status:    Dispo: Mr Castorena is a 85 YOM w a PMH of alzheimer's dementia with hallucination (followed by Dr. Juan Neurologist), HFpEF, HTN, paroxysmal Afib on xarelto , Aortic Stenosis s/p AV replacement; CAD s/p CABG and hyperlipidema presenting with worsening confusion    Metabolic Encephalopathy   pseudominas/ Gram Neg Rods bacteriemia   SIRS present on admission   Infectious from Acute cystitis and possibly Structural from Hematoma  Head CT: subacute to chronic subdural hematomas. No significant mass effect or midline shift.  Neurosurgery Re[place] but Family declined understanding risk of brain herniation and death   Patient on Xarelto for AFIB: continue to hold for now   Repeat Head CT: The density of the bilateral cerebral convexity and falcine subdural collections appears uniformly increased when compared to   the previous head CT probably reflecting IV contrast. Recommend short-term follow-up imaging.  Treat underline UTI and follow-up Ucx, and bcx   Neuro checks Q8, keep NPO pending speech therapy consult   Will Repeat  Head CT in Am for further evaluation >>Pending   ID consult appreciated  -Follow up with blood and urine cultures.   -Keep on Zosyn 4.5 gram q 8 hours. (S/D of abx 3/19)     Subdural Hematoma vs hygroma  case discussed w NeuroSgx who recomended NeuroIR  f/u NeuroIR consult  PER NEUROSGX HOLD AC for MIN 4 weeks       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  AORTIC Stenosis s/p AV replacement   Paroxysmal AFIB + HTN   HLD   -BP on Lower side, Hold Antihypertensives and continue IVF   -Continue Amiodarone , Statin, and DASH    BPH: continue Finasteride  and Flomax   Dementia: continue Seroquel and Zoloft with  daily re-orientation     DVT PPX: Xarelto on hold for Hematoma MUST BE HELD 4 WEEKS PER NEUROSGX, SCD's for now   GI PPX: PPI   DNI/DNR   Dispo: From Home   Pending Clinical Improvement, acute                   #Misc  - DVT Prophylaxis:  - GI Prophylaxis:  - Diet:  - Activity:  - IV Fluids:  - Code Status:    Dispo:

## 2025-03-23 LAB
-  AMIKACIN: SIGNIFICANT CHANGE UP
-  AZTREONAM: SIGNIFICANT CHANGE UP
-  CEFEPIME: SIGNIFICANT CHANGE UP
-  CEFTAZIDIME: SIGNIFICANT CHANGE UP
-  CIPROFLOXACIN: SIGNIFICANT CHANGE UP
-  IMIPENEM: SIGNIFICANT CHANGE UP
-  LEVOFLOXACIN: SIGNIFICANT CHANGE UP
-  MEROPENEM: SIGNIFICANT CHANGE UP
-  PIPERACILLIN/TAZOBACTAM: SIGNIFICANT CHANGE UP
ANION GAP SERPL CALC-SCNC: 7 MMOL/L — SIGNIFICANT CHANGE UP (ref 7–14)
BUN SERPL-MCNC: 36 MG/DL — HIGH (ref 10–20)
CALCIUM SERPL-MCNC: 8.6 MG/DL — SIGNIFICANT CHANGE UP (ref 8.4–10.5)
CHLORIDE SERPL-SCNC: 117 MMOL/L — HIGH (ref 98–110)
CO2 SERPL-SCNC: 26 MMOL/L — SIGNIFICANT CHANGE UP (ref 17–32)
CREAT SERPL-MCNC: 0.8 MG/DL — SIGNIFICANT CHANGE UP (ref 0.7–1.5)
CULTURE RESULTS: ABNORMAL
CULTURE RESULTS: ABNORMAL
EGFR: 87 ML/MIN/1.73M2 — SIGNIFICANT CHANGE UP
EGFR: 87 ML/MIN/1.73M2 — SIGNIFICANT CHANGE UP
GLUCOSE SERPL-MCNC: 114 MG/DL — HIGH (ref 70–99)
HCT VFR BLD CALC: 28.7 % — LOW (ref 42–52)
HGB BLD-MCNC: 9.6 G/DL — LOW (ref 14–18)
MCHC RBC-ENTMCNC: 30.2 PG — SIGNIFICANT CHANGE UP (ref 27–31)
MCHC RBC-ENTMCNC: 33.4 G/DL — SIGNIFICANT CHANGE UP (ref 32–37)
MCV RBC AUTO: 90.3 FL — SIGNIFICANT CHANGE UP (ref 80–94)
METHOD TYPE: SIGNIFICANT CHANGE UP
NRBC BLD AUTO-RTO: 0 /100 WBCS — SIGNIFICANT CHANGE UP (ref 0–0)
ORGANISM # SPEC MICROSCOPIC CNT: ABNORMAL
ORGANISM # SPEC MICROSCOPIC CNT: SIGNIFICANT CHANGE UP
PLATELET # BLD AUTO: 295 K/UL — SIGNIFICANT CHANGE UP (ref 130–400)
PMV BLD: 11.3 FL — HIGH (ref 7.4–10.4)
POTASSIUM SERPL-MCNC: 5.4 MMOL/L — HIGH (ref 3.5–5)
POTASSIUM SERPL-SCNC: 5.4 MMOL/L — HIGH (ref 3.5–5)
RBC # BLD: 3.18 M/UL — LOW (ref 4.7–6.1)
RBC # FLD: 16.9 % — HIGH (ref 11.5–14.5)
SODIUM SERPL-SCNC: 150 MMOL/L — HIGH (ref 135–146)
SPECIMEN SOURCE: SIGNIFICANT CHANGE UP
SPECIMEN SOURCE: SIGNIFICANT CHANGE UP
WBC # BLD: 12.22 K/UL — HIGH (ref 4.8–10.8)
WBC # FLD AUTO: 12.22 K/UL — HIGH (ref 4.8–10.8)

## 2025-03-23 PROCEDURE — 99233 SBSQ HOSP IP/OBS HIGH 50: CPT

## 2025-03-23 RX ORDER — IBUPROFEN 200 MG
400 TABLET ORAL ONCE
Refills: 0 | Status: COMPLETED | OUTPATIENT
Start: 2025-03-23 | End: 2025-03-23

## 2025-03-23 RX ORDER — MEROPENEM 1 G/30ML
2000 INJECTION INTRAVENOUS EVERY 8 HOURS
Refills: 0 | Status: COMPLETED | OUTPATIENT
Start: 2025-03-23 | End: 2025-03-30

## 2025-03-23 RX ADMIN — MEROPENEM 280 MILLIGRAM(S): 1 INJECTION INTRAVENOUS at 14:10

## 2025-03-23 RX ADMIN — Medication 400 MILLIGRAM(S): at 18:40

## 2025-03-23 RX ADMIN — Medication 70 MILLILITER(S): at 14:10

## 2025-03-23 RX ADMIN — ATORVASTATIN CALCIUM 10 MILLIGRAM(S): 80 TABLET, FILM COATED ORAL at 21:08

## 2025-03-23 RX ADMIN — SERTRALINE 50 MILLIGRAM(S): 100 TABLET, FILM COATED ORAL at 12:06

## 2025-03-23 RX ADMIN — Medication 650 MILLIGRAM(S): at 14:30

## 2025-03-23 RX ADMIN — Medication 650 MILLIGRAM(S): at 21:08

## 2025-03-23 RX ADMIN — TAMSULOSIN HYDROCHLORIDE 0.4 MILLIGRAM(S): 0.4 CAPSULE ORAL at 21:08

## 2025-03-23 RX ADMIN — MEROPENEM 280 MILLIGRAM(S): 1 INJECTION INTRAVENOUS at 21:08

## 2025-03-23 RX ADMIN — FINASTERIDE 5 MILLIGRAM(S): 1 TABLET, FILM COATED ORAL at 12:06

## 2025-03-23 RX ADMIN — Medication 400 MILLIGRAM(S): at 18:09

## 2025-03-23 RX ADMIN — AMIODARONE HYDROCHLORIDE 100 MILLIGRAM(S): 50 INJECTION, SOLUTION INTRAVENOUS at 08:39

## 2025-03-23 RX ADMIN — Medication 25 GRAM(S): at 05:38

## 2025-03-23 RX ADMIN — QUETIAPINE FUMARATE 12.5 MILLIGRAM(S): 25 TABLET ORAL at 21:08

## 2025-03-23 RX ADMIN — Medication 650 MILLIGRAM(S): at 15:15

## 2025-03-23 RX ADMIN — Medication 1 APPLICATION(S): at 05:39

## 2025-03-23 NOTE — PROGRESS NOTE ADULT - SUBJECTIVE AND OBJECTIVE BOX
TONJA PECK 85y Male  MRN#: [id number]   Hospital Day: 4d    SUBJECTIVE  Patient is a 85y old Male who presents with a chief complaint of AMS (22 Mar 2025 14:07)  Currently admitted to medicine with the primary diagnosis of Acute UTI      INTERVAL HPI AND OVERNIGHT EVENTS:  Patient was examined and seen at bedside. This morning he is resting comfortably in bed     REVIEW OF SYMPTOMS:  altered     OBJECTIVE  PAST MEDICAL & SURGICAL HISTORY  Afib    CHF (congestive heart failure)    Hypertension    S/P CABG (coronary artery bypass graft)      ALLERGIES:  No Known Allergies    MEDICATIONS:  STANDING MEDICATIONS  aMIOdarone    Tablet 100 milliGRAM(s) Oral <User Schedule>  atorvastatin 10 milliGRAM(s) Oral at bedtime  chlorhexidine 2% Cloths 1 Application(s) Topical <User Schedule>  finasteride 5 milliGRAM(s) Oral daily  meropenem  IVPB 2000 milliGRAM(s) IV Intermittent every 8 hours  pantoprazole    Tablet 40 milliGRAM(s) Oral before breakfast  QUEtiapine 12.5 milliGRAM(s) Oral at bedtime  sertraline 50 milliGRAM(s) Oral daily  sodium chloride 0.9%. 1000 milliLiter(s) IV Continuous <Continuous>  tamsulosin 0.4 milliGRAM(s) Oral at bedtime    PRN MEDICATIONS  acetaminophen     Tablet .. 650 milliGRAM(s) Oral every 6 hours PRN  acetaminophen  Suppository .. 650 milliGRAM(s) Rectal every 6 hours PRN  ondansetron Injectable 4 milliGRAM(s) IV Push every 6 hours PRN  senna 2 Tablet(s) Oral at bedtime PRN      VITAL SIGNS: Last 24 Hours  T(C): 38.2 (23 Mar 2025 15:06), Max: 38.4 (22 Mar 2025 18:31)  T(F): 100.8 (23 Mar 2025 15:06), Max: 101.2 (22 Mar 2025 18:31)  HR: 67 (23 Mar 2025 13:34) (50 - 67)  BP: 161/63 (23 Mar 2025 13:34) (161/63 - 187/96)  BP(mean): --  RR: 18 (23 Mar 2025 04:25) (18 - 18)  SpO2: 97% (23 Mar 2025 15:06) (94% - 100%)    LABS:                        9.6    12.22 )-----------( 295      ( 23 Mar 2025 04:30 )             28.7     03-23    150[H]  |  117[H]  |  36[H]  ----------------------------<  114[H]  5.4[H]   |  26  |  0.8    Ca    8.6      23 Mar 2025 04:30    TPro  5.8[L]  /  Alb  2.9[L]  /  TBili  0.4  /  DBili  x   /  AST  22  /  ALT  21  /  AlkPhos  125[H]  03-22      Urinalysis Basic - ( 23 Mar 2025 04:30 )    Color: x / Appearance: x / SG: x / pH: x  Gluc: 114 mg/dL / Ketone: x  / Bili: x / Urobili: x   Blood: x / Protein: x / Nitrite: x   Leuk Esterase: x / RBC: x / WBC x   Sq Epi: x / Non Sq Epi: x / Bacteria: x            Culture - Blood (collected 21 Mar 2025 07:04)  Source: Blood None  Gram Stain (22 Mar 2025 12:15):    Growth in aerobic bottle: Gram Negative Rods  Final Report (23 Mar 2025 12:01):    Growth in aerobic bottle: Pseudomonas aeruginosa See previous culture    [id number]          RADIOLOGY:      PHYSICAL EXAM:  CONSTITUTIONAL: No acute distress, well-groomed, sleeping, snoring   HEAD: Atraumatic, normocephalic  EYES: conjunctiva and sclera clear  ENT: Supple, no masses, no JVD; moist mucous membranes  PULMONARY: rhonchi   CARDIOVASCULAR: Regular rate and rhythm; mechanical valve sound appreciated   GASTROINTESTINAL: Soft, non-tender, non-distended; bowel sounds present  MUSCULOSKELETAL: 2+ peripheral pulses; no clubbing, no cyanosis, no edema  NEUROLOGY: no tremor no fasciculations babinski down going   SKIN: No rashes or lesions; warm and dry

## 2025-03-23 NOTE — PHARMACOTHERAPY INTERVENTION NOTE - NSPHARMCOMMASP
ASP - De-escalation
ASP - Therapy recommended/ Alternative therapy
ASP - Dose optimization/Non-Renal dose adjustment

## 2025-03-23 NOTE — SWALLOW BEDSIDE ASSESSMENT ADULT - SWALLOW EVAL: FUNCTIONAL LEVEL AT TIME OF EVAL
Later in the afternoon met with pt's son and spouse at b/s. Report pt coughs with water at home. Otherwise tolerates Ensures and soft solids.
PCA reports +toleration of diet

## 2025-03-23 NOTE — PHARMACOTHERAPY INTERVENTION NOTE - COMMENTS
As per policy, adjusted piperacillin/tazobactam dose from 3.375 g IV q8h to 4.5 g IV q8h since the 3/19 blood culture grew Pseudomonas aeruginosa.    Emilie Donovan, PharmD, [facility]  Clinical Pharmacy Specialist, Infectious Diseases  Tele-Antimicrobial Stewardship Program (Tele-ASP)  Available on Microsoft Teams  
Physician Lead Consult: Contacted for assistance with placing meropenem order    Recommended meropenem 2g IV q8h, dosed based on an eGFR of ~80mL/min/1.73m2, in the setting of patient spiking fevers on high-dose piperacillin-tazobactam with cultures growing Pseudomonas aeruginosa susceptible to meropenem. Team ruling out endocarditis due to febrile episodes with TTE.     Rodriguez Naranjo, PharmD, [facility]  Clinical Pharmacy Specialist, Infectious Diseases  Tele-Antimicrobial Stewardship Program (Tele-ASP)  Tele-ASP Phone: [phone] 
Recommended discontinuing piperacillin-tazobactam as per ID consult recommendations.

## 2025-03-23 NOTE — PROGRESS NOTE ADULT - ASSESSMENT
Mr Castorena is a 85 YOM w a PMH of alzheimer's dementia with hallucination (followed by Dr. Juan Neurologist), HFpEF, HTN, paroxysmal Afib on xarelto , Aortic Stenosis s/p AV replacement; CAD s/p CABG and hyperlipidema presenting with worsening confusion    Cased discussed w ID and wife at length at bedside     Metabolic Encephalopathy   pseudominas/ Gram Neg Rods bacteriemia   SIRS present on admission   Infectious from Acute cystitis and possibly Structural from Hematoma  Head CT: subacute to chronic subdural hematomas. No significant mass effect or midline shift.  Neurosurgery Recommended Transfer to [place] but Family declined understanding risk of brain herniation and death   Patient on Xarelto for AFIB: continue to hold for now   Repeat Head CT: The density of the bilateral cerebral convexity and falcine subdural collections appears uniformly increased when compared to   the previous head CT probably reflecting IV contrast. Recommend short-term follow-up imaging.  Treat underline UTI and follow-up Ucx, and bcx   Neuro checks Q8, keep NPO pending speech therapy consult   Will Repeat  Head CT in Am for further evaluation >>Pending   ID consult appreciated  -Follow up with blood and urine cultures.   -Keep on Zosyn 4.5 gram q 8 hours. (S/D of abx 3/19)   -f/u TTE    Subdural Hematoma vs hygroma  case discussed w NeuroSgx who recomended NeuroIR  f/u NeuroIR consult  PER NEUROSGX HOLD AC for MIN 4 weeks       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  AORTIC Stenosis s/p AV replacement   Paroxysmal AFIB + HTN   HLD   -BP on Lower side, Hold Antihypertensives and continue IVF   -Continue Amiodarone , Statin, and DASH    BPH: continue Finasteride  and Flomax   Dementia: continue Seroquel and Zoloft with  daily re-orientation     DVT PPX: Xarelto on hold for Hematoma MUST BE HELD 4 WEEKS PER NEUROSGX, SCD's for now   GI PPX: PPI   DNI/DNR   Dispo: From Home   Pending Clinical Improvement, acute                Mr Castorena is a 85 YOM w a PMH of alzheimer's dementia with hallucination (followed by Dr. Juan Neurologist), HFpEF, HTN, paroxysmal Afib on xarelto , Aortic Stenosis s/p AV replacement; CAD s/p CABG and hyperlipidema presenting with worsening confusion    Cased discussed w ID and wife at length at bedside     Metabolic Encephalopathy   pseudominas/ Gram Neg Rods bacteriemia   SIRS present on admission   Infectious from Acute cystitis and possibly Structural from Hematoma  Head CT: subacute to chronic subdural hematomas. No significant mass effect or midline shift.  Neurosurgery Recommended Transfer to [place] but Family declined understanding risk of brain herniation and death   Patient on Xarelto for AFIB: continue to hold for now   Repeat Head CT: The density of the bilateral cerebral convexity and falcine subdural collections appears uniformly increased when compared to   the previous head CT probably reflecting IV contrast. Recommend short-term follow-up imaging.  Treat underline UTI and follow-up Ucx, and bcx   Neuro checks Q8, keep NPO pending speech therapy consult   Will Repeat  Head CT in Am for further evaluation >>Pending   ID consult appreciated  -Follow up with blood and urine cultures.   -per discussion w ID change Zosyn 4.5 gram q 8 hours. (S/D of abx 3/19) to Meropenem (day 1 3/23)   -f/u TTE    Subdural Hematoma vs hygroma  case discussed w NeuroSgx who recomended NeuroIR  f/u NeuroIR consult  PER NEUROSGX HOLD AC for MIN 4 weeks       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  AORTIC Stenosis s/p AV replacement   Paroxysmal AFIB + HTN   HLD   -BP on Lower side, Hold Antihypertensives and continue IVF   -Continue Amiodarone , Statin, and DASH    BPH: continue Finasteride  and Flomax   Dementia: continue Seroquel and Zoloft with  daily re-orientation     DVT PPX: Xarelto on hold for Hematoma MUST BE HELD 4 WEEKS PER NEUROSGX, SCD's for now   GI PPX: PPI   DNI/DNR   Dispo: From Home   Pending Clinical Improvement, acute

## 2025-03-24 ENCOUNTER — RESULT REVIEW (OUTPATIENT)
Age: 85
End: 2025-03-24

## 2025-03-24 LAB
ANION GAP SERPL CALC-SCNC: 8 MMOL/L — SIGNIFICANT CHANGE UP (ref 7–14)
BUN SERPL-MCNC: 34 MG/DL — HIGH (ref 10–20)
CALCIUM SERPL-MCNC: 8.7 MG/DL — SIGNIFICANT CHANGE UP (ref 8.4–10.5)
CHLORIDE SERPL-SCNC: 121 MMOL/L — HIGH (ref 98–110)
CO2 SERPL-SCNC: 26 MMOL/L — SIGNIFICANT CHANGE UP (ref 17–32)
CREAT SERPL-MCNC: 0.8 MG/DL — SIGNIFICANT CHANGE UP (ref 0.7–1.5)
EGFR: 87 ML/MIN/1.73M2 — SIGNIFICANT CHANGE UP
EGFR: 87 ML/MIN/1.73M2 — SIGNIFICANT CHANGE UP
GLUCOSE SERPL-MCNC: 115 MG/DL — HIGH (ref 70–99)
HCT VFR BLD CALC: 26.9 % — LOW (ref 42–52)
HGB BLD-MCNC: 9.3 G/DL — LOW (ref 14–18)
MCHC RBC-ENTMCNC: 30.8 PG — SIGNIFICANT CHANGE UP (ref 27–31)
MCHC RBC-ENTMCNC: 34.6 G/DL — SIGNIFICANT CHANGE UP (ref 32–37)
MCV RBC AUTO: 89.1 FL — SIGNIFICANT CHANGE UP (ref 80–94)
NRBC BLD AUTO-RTO: 0 /100 WBCS — SIGNIFICANT CHANGE UP (ref 0–0)
PLATELET # BLD AUTO: 335 K/UL — SIGNIFICANT CHANGE UP (ref 130–400)
PMV BLD: 11 FL — HIGH (ref 7.4–10.4)
POTASSIUM SERPL-MCNC: 4.7 MMOL/L — SIGNIFICANT CHANGE UP (ref 3.5–5)
POTASSIUM SERPL-SCNC: 4.7 MMOL/L — SIGNIFICANT CHANGE UP (ref 3.5–5)
RBC # BLD: 3.02 M/UL — LOW (ref 4.7–6.1)
RBC # FLD: 16.9 % — HIGH (ref 11.5–14.5)
SODIUM SERPL-SCNC: 155 MMOL/L — HIGH (ref 135–146)
WBC # BLD: 12.32 K/UL — HIGH (ref 4.8–10.8)
WBC # FLD AUTO: 12.32 K/UL — HIGH (ref 4.8–10.8)

## 2025-03-24 PROCEDURE — 93307 TTE W/O DOPPLER COMPLETE: CPT | Mod: 26

## 2025-03-24 PROCEDURE — 99233 SBSQ HOSP IP/OBS HIGH 50: CPT

## 2025-03-24 RX ORDER — SODIUM CHLORIDE 9 G/1000ML
1000 INJECTION, SOLUTION INTRAVENOUS
Refills: 0 | Status: DISCONTINUED | OUTPATIENT
Start: 2025-03-24 | End: 2025-03-26

## 2025-03-24 RX ORDER — IBUPROFEN 200 MG
400 TABLET ORAL ONCE
Refills: 0 | Status: COMPLETED | OUTPATIENT
Start: 2025-03-24 | End: 2025-03-24

## 2025-03-24 RX ADMIN — Medication 1 APPLICATION(S): at 05:18

## 2025-03-24 RX ADMIN — MEROPENEM 280 MILLIGRAM(S): 1 INJECTION INTRAVENOUS at 14:18

## 2025-03-24 RX ADMIN — Medication 650 MILLIGRAM(S): at 20:15

## 2025-03-24 RX ADMIN — MEROPENEM 280 MILLIGRAM(S): 1 INJECTION INTRAVENOUS at 21:53

## 2025-03-24 RX ADMIN — FINASTERIDE 5 MILLIGRAM(S): 1 TABLET, FILM COATED ORAL at 12:10

## 2025-03-24 RX ADMIN — TAMSULOSIN HYDROCHLORIDE 0.4 MILLIGRAM(S): 0.4 CAPSULE ORAL at 21:54

## 2025-03-24 RX ADMIN — Medication 400 MILLIGRAM(S): at 23:39

## 2025-03-24 RX ADMIN — SODIUM CHLORIDE 75 MILLILITER(S): 9 INJECTION, SOLUTION INTRAVENOUS at 14:18

## 2025-03-24 RX ADMIN — Medication 40 MILLIGRAM(S): at 05:21

## 2025-03-24 RX ADMIN — QUETIAPINE FUMARATE 12.5 MILLIGRAM(S): 25 TABLET ORAL at 21:54

## 2025-03-24 RX ADMIN — Medication 650 MILLIGRAM(S): at 22:00

## 2025-03-24 RX ADMIN — MEROPENEM 280 MILLIGRAM(S): 1 INJECTION INTRAVENOUS at 05:19

## 2025-03-24 RX ADMIN — SERTRALINE 50 MILLIGRAM(S): 100 TABLET, FILM COATED ORAL at 12:11

## 2025-03-24 RX ADMIN — ATORVASTATIN CALCIUM 10 MILLIGRAM(S): 80 TABLET, FILM COATED ORAL at 21:53

## 2025-03-24 NOTE — PROGRESS NOTE ADULT - SUBJECTIVE AND OBJECTIVE BOX
CISCO TONJA  85y, Male    LOS  5d    INTERVAL EVENTS/HPI  - T(F): , Max: 101.2 (03-23-25 @ 20:12)  - WBC Count: 12.32 (03-24-25 @ 06:45)  WBC Count: 12.22 (03-23-25 @ 04:30)  - Creatinine: 0.8 (03-24-25 @ 06:45)  Creatinine: 0.8 (03-23-25 @ 04:30)    REVIEW OF SYSTEMS: cannot obtain further history from the patient secondary to altered mental status or sedation    Prior hospital charts reviewed [Yes]  Primary team notes reviewed [Yes]  Other consultant notes reviewed [Yes]    ANTIMICROBIALS:   meropenem  IVPB 2000 every 8 hours      OTHER MEDS: MEDICATIONS  (STANDING):  acetaminophen     Tablet .. 650 every 6 hours PRN  acetaminophen  Suppository .. 650 every 6 hours PRN  aMIOdarone    Tablet 100 <User Schedule>  atorvastatin 10 at bedtime  finasteride 5 daily  ondansetron Injectable 4 every 6 hours PRN  pantoprazole    Tablet 40 before breakfast  QUEtiapine 12.5 at bedtime  senna 2 at bedtime PRN  sertraline 50 daily  tamsulosin 0.4 at bedtime      Vital Signs Last 24 Hrs  T(F): 99.8 (03-24-25 @ 13:15), Max: 103.8 (03-19-25 @ 23:57)    Vital Signs Last 24 Hrs  HR: 72 (03-24-25 @ 06:19) (71 - 72)  BP: 156/84 (03-24-25 @ 06:19) (154/70 - 156/84)  RR: 18 (03-24-25 @ 06:19)  SpO2: 97% (03-24-25 @ 06:19) (96% - 97%)  Wt(kg): --    EXAM:  GENERAL: Somnolent.  HEAD: No head lesions  NECK: Supple  CHEST/LUNG: Shallow breath sounds.   HEART: S1 S2  ABDOMEN: Soft, nontender.  EXTREMITIES: No clubbing  NERVOUS SYSTEM: Somnolent.   MSK: No joint erythema, swelling or pain  SKIN: No rashes or lesions, no superficial thrombophlebitis    Labs:                        9.3    12.32 )-----------( 335      ( 24 Mar 2025 06:45 )             26.9     03-24    155[H]  |  121[H]  |  34[H]  ----------------------------<  115[H]  4.7   |  26  |  0.8    Ca    8.7      24 Mar 2025 06:45        WBC Trend:  WBC Count: 12.32 (03-24-25 @ 06:45)  WBC Count: 12.22 (03-23-25 @ 04:30)  WBC Count: 14.89 (03-22-25 @ 04:30)  WBC Count: 19.00 (03-21-25 @ 07:04)      Creatine Trend:  Creatinine: 0.8 (03-24)  Creatinine: 0.8 (03-23)  Creatinine: 1.1 (03-22)  Creatinine: 1.1 (03-21)      Liver Biochemical Testing Trend:  Alanine Aminotransferase (ALT/SGPT): 21 (03-22)  Alanine Aminotransferase (ALT/SGPT): 24 (03-21)  Alanine Aminotransferase (ALT/SGPT): 31 (03-19)  Alanine Aminotransferase (ALT/SGPT): 12 (06-26)  Alanine Aminotransferase (ALT/SGPT): 13 (06-25)  Aspartate Aminotransferase (AST/SGOT): 22 (03-22-25 @ 04:30)  Aspartate Aminotransferase (AST/SGOT): 23 (03-21-25 @ 07:04)  Aspartate Aminotransferase (AST/SGOT): 28 (03-19-25 @ 18:30)  Aspartate Aminotransferase (AST/SGOT): 22 (06-26-24 @ 07:17)  Aspartate Aminotransferase (AST/SGOT): 19 (06-25-24 @ 08:12)  Bilirubin Total: 0.4 (03-22)  Bilirubin Total: 0.6 (03-21)  Bilirubin Total: 0.5 (03-19)  Bilirubin Total: 0.4 (06-26)  Bilirubin Total: 0.5 (06-25)      Trend LDH      Urinalysis Basic - ( 24 Mar 2025 06:45 )    Color: x / Appearance: x / SG: x / pH: x  Gluc: 115 mg/dL / Ketone: x  / Bili: x / Urobili: x   Blood: x / Protein: x / Nitrite: x   Leuk Esterase: x / RBC: x / WBC x   Sq Epi: x / Non Sq Epi: x / Bacteria: x        MICROBIOLOGY:    Male    Culture - Blood (collected 21 Mar 2025 07:04)  Source: Blood None  Final Report:    Growth in aerobic bottle: Pseudomonas aeruginosa See previous culture    [id number]    Culture - Urine (collected 19 Mar 2025 19:35)  Source: Catheterized None  Final Report:    >100,000 CFU/ml Pseudomonas aeruginosa  Organism: Pseudomonas aeruginosa  Organism: Pseudomonas aeruginosa    Sensitivities:      Method Type: NIALL      -  Amikacin: S <=16      -  Aztreonam: R >16      -  Cefepime: R >16      -  Ceftazidime: R >16      -  Ciprofloxacin: I 1      -  Imipenem: S <=1      -  Levofloxacin: I 2      -  Meropenem: S <=1      -  Piperacillin/Tazobactam: R >64    Urinalysis with Rflx Culture (collected 19 Mar 2025 19:35)    Culture - Blood (collected 19 Mar 2025 18:30)  Source: Blood Blood-Peripheral  Final Report:    Growth in aerobic bottle: Pseudomonas aeruginosa    Direct identification is available within approximately 3-5    hours either by Blood Panel Multiplexed PCR or Direct    MALDI-TOF. Details: [email or web address]  Organism: Blood Culture PCR  Pseudomonas aeruginosa  Organism: Pseudomonas aeruginosa    Sensitivities:      Method Type: NIALL      -  Aztreonam: S <=4      -  Cefepime: S 8      -  Ceftazidime: S 4      -  Ciprofloxacin: I 1      -  Imipenem: S <=1      -  Levofloxacin: I 2      -  Meropenem: S <=1      -  Piperacillin/Tazobactam: S <=8  Organism: Blood Culture PCR    Sensitivities:      Method Type: PCR      -  Pseudomonas aeruginosa: Detec    Culture - Blood (collected 19 Mar 2025 18:30)  Source: Blood Blood-Peripheral  Final Report:    Growth in aerobic bottle: Pseudomonas aeruginosa    See previous culture [id number]    RADIOLOGY & ADDITIONAL TESTS:  I have personally reviewed the relevant images.   CXR      CT    < from: Xray Shoulder 2 Views, Left (03.22.25 @ 22:23) >  FINDINGS/  IMPRESSION:    No acute fractures or dislocations are seen.    The left shoulder is high riding. There is no evidence of significant   degenerative disease.    The bones are osteopenic.    --- End of Report ---    < end of copied text >  < from: CT Head No Cont (03.21.25 @ 10:06) >    IMPRESSION:  Motion degraded examination.    Essentially unchanged appearance of bilateral mixed density demonstrated  subdural collections likely reflecting subacute to chronic subdural   hematomas versus hygromas.    --- End of Report ---      < end of copied text >        WEIGHT  Weight (kg): 66 (03-19-25 @ 23:57)  Creatinine: 0.8 mg/dL (03-24-25 @ 06:45)      All available historical records have been reviewed    < from: TTE Echo Complete w/ Contrast w/o Doppler (03.24.25 @ 09:35) >  Summary:   1. Normal global left ventricular systolic function.   2. LV Ejection Fraction by Cazares's Method with a biplane EF of 52 %.   3. Severe concentric left ventricular hypertrophy.   4. Abnormal septal motion consistent with left bundle branch block.   5. The left ventricular diastolic function could not be assessedin this   study.   6. Normal right ventricular size and function.   7. Severely enlarged left atrium. LA volume Index is 45.6 ml/m².   8. Normal right atrial size.   9. Bioprosthetic aortic valve with significant stenosis with PV 4.1 m/s,   PG 67 mmHg, 37 mmHg, EOA 1.33 cm2, EOAi 0.74 cm2/m2. Moderate prosthetic   valve regurgitation.  10. Moderate thickening and calcification of the posterior mitral valve   leaflet.  11. Possible small, mobile vegetation on atrial-side of anterior leaflet   of mitral valve.  12. Mild mitral valve regurgitation.  13. Mild pulmonic valve regurgitation.  14. Dilatation of the ascending aorta, measures 3.8 cm.  15. Normal pulmonary artery pressure.  16. There is no evidence of pericardial effusion.    < end of copied text >

## 2025-03-24 NOTE — PROGRESS NOTE ADULT - ASSESSMENT
This is a 84 year old male with PMH of late onset alzheimer's dementia with hallucination, paroxysmal Afib on xarelto , Aortic Stenosis s/p AV replacement; CAD s/p CABG, CHF,  HTN and hyperlipidema presenting with AMS    IMPRESSION  #Metabolic encephalopathy   #Subdural hematomas/Hygromas- Currently can not rule out the possibility of abcess or seeding of infection there.   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    RECOMMENDATIONS  -Follow up with Neurology and Neurosurgery.   -Follow up with Cardiology. It will be ideal if he received a ALBERTO to further define the vegetation.  -If no further intervention is being planned for neurosurgery stand point then proceed with PICC line.  -IV plan for IV meropenem 2 gram q 8 hours from 3/23/2025.   -Weekly CBC, CMP, ESR/CRP  - ID follow-up with Dr. Jai Woods for Telehealth. We will call the patient between 10:30-1:30      [address] [facility]       [phone]       Fax [phone]  -Will need repeat imaging of the head before stopping the abx.   -Off loading to prevent pressure sores and preventive measures to avoid aspiration. GOC    If any questions, please send a message or call on [facility] Teams  Please continue to update ID with any pertinent new laboratory or radiographic findings.    Mohan Benz M.D  Infectious Diseases Attending/   [employer] at [facility]   This is a 84 year old male with PMH of late onset alzheimer's dementia with hallucination, paroxysmal Afib on xarelto , Aortic Stenosis s/p AV replacement; CAD s/p CABG, CHF,  HTN and hyperlipidema presenting with AMS    IMPRESSION  #Metabolic encephalopathy   #Subdural hematomas/Hygromas- Currently can not rule out the possibility of abcess or seeding of infection there.   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    RECOMMENDATIONS  -Follow up with Neurology and Neurosurgery.   -Follow up with Cardiology. It will be ideal if he received a ALBERTO to further define the vegetation.  -If no further intervention is being planned for neurosurgery stand point then proceed with PICC line.  -IV plan for IV meropenem 2 gram q 8 hours from 3/23/2025.   -Weekly CBC, CMP, ESR/CRP  - ID follow-up with Dr. Jai Woods for Telehealth. We will call the patient between 10:30-1:30      [facility]       [phone]       Fax [phone]  -Will need repeat imaging of the head before stopping the abx. Repeat TTE in the outpatient setting.   -Off loading to prevent pressure sores and preventive measures to avoid aspiration. GOC    If any questions, please send a message or call on [employer] Teams  Please continue to update ID with any pertinent new laboratory or radiographic findings.    Mohan Benz M.D  Infectious Diseases Attending/   [employer] at [facility] [place]

## 2025-03-24 NOTE — PROGRESS NOTE ADULT - SUBJECTIVE AND OBJECTIVE BOX
TONJA PECK 85y Male  MRN#: [id number]   Hospital Day: 5d    SUBJECTIVE  Patient is a 85y old Male who presents with a chief complaint of AMS (23 Mar 2025 15:53)  Currently admitted to medicine with the primary diagnosis of Acute UTI      INTERVAL HPI AND OVERNIGHT EVENTS:  Patient was examined and seen at bedside. This morning he is resting comfortably in bed.    REVIEW OF SYMPTOMS:  CONSTITUTIONAL: No weakness, fevers or chills; No headaches  EYES: No visual changes, eye pain, or discharge  ENT: No vertigo; No ear pain or change in hearing; No sore throat or difficulty swallowing  NECK: No pain or stiffness  RESPIRATORY: No cough, wheezing, or hemoptysis; No shortness of breath  CARDIOVASCULAR: No chest pain or palpitations  GASTROINTESTINAL: No abdominal or epigastric pain; No nausea, vomiting; No diarrhea or constipation  GENITOURINARY: No dysuria, frequency or hematuria  MUSCULOSKELETAL: No joint pain, no muscle pain, no weakness  NEUROLOGICAL: No numbness or weakness  SKIN: No itching or rashes    OBJECTIVE  PAST MEDICAL & SURGICAL HISTORY  Afib    CHF (congestive heart failure)    Hypertension    S/P CABG (coronary artery bypass graft)      ALLERGIES:  No Known Allergies    MEDICATIONS:  STANDING MEDICATIONS  aMIOdarone    Tablet 100 milliGRAM(s) Oral <User Schedule>  atorvastatin 10 milliGRAM(s) Oral at bedtime  chlorhexidine 2% Cloths 1 Application(s) Topical <User Schedule>  finasteride 5 milliGRAM(s) Oral daily  meropenem  IVPB 2000 milliGRAM(s) IV Intermittent every 8 hours  pantoprazole    Tablet 40 milliGRAM(s) Oral before breakfast  QUEtiapine 12.5 milliGRAM(s) Oral at bedtime  sertraline 50 milliGRAM(s) Oral daily  sodium chloride 0.9%. 1000 milliLiter(s) IV Continuous <Continuous>  tamsulosin 0.4 milliGRAM(s) Oral at bedtime    PRN MEDICATIONS  acetaminophen     Tablet .. 650 milliGRAM(s) Oral every 6 hours PRN  acetaminophen  Suppository .. 650 milliGRAM(s) Rectal every 6 hours PRN  ondansetron Injectable 4 milliGRAM(s) IV Push every 6 hours PRN  senna 2 Tablet(s) Oral at bedtime PRN      VITAL SIGNS: Last 24 Hours  T(C): 37.3 (24 Mar 2025 06:19), Max: 38.4 (23 Mar 2025 20:12)  T(F): 99.2 (24 Mar 2025 06:19), Max: 101.2 (23 Mar 2025 20:12)  HR: 72 (24 Mar 2025 06:19) (67 - 72)  BP: 156/84 (24 Mar 2025 06:19) (154/70 - 161/63)  BP(mean): --  RR: 18 (24 Mar 2025 06:19) (18 - 18)  SpO2: 97% (24 Mar 2025 06:19) (94% - 97%)    LABS:                        9.3    12.32 )-----------( 335      ( 24 Mar 2025 06:45 )             26.9     03-23    150[H]  |  117[H]  |  36[H]  ----------------------------<  114[H]  5.4[H]   |  26  |  0.8    Ca    8.6      23 Mar 2025 04:30        Urinalysis Basic - ( 23 Mar 2025 04:30 )    Color: x / Appearance: x / SG: x / pH: x  Gluc: 114 mg/dL / Ketone: x  / Bili: x / Urobili: x   Blood: x / Protein: x / Nitrite: x   Leuk Esterase: x / RBC: x / WBC x   Sq Epi: x / Non Sq Epi: x / Bacteria: x                RADIOLOGY:      PHYSICAL EXAM:  CONSTITUTIONAL: No acute distress, well-developed, well-groomed, AAOx3  HEAD: Atraumatic, normocephalic  EYES: EOM intact, PERRLA, conjunctiva and sclera clear  ENT: Supple, no masses, no thyromegaly, no bruits, no JVD; moist mucous membranes  PULMONARY: Clear to auscultation bilaterally; no wheezes, rales, or rhonchi  CARDIOVASCULAR: Regular rate and rhythm; no murmurs, rubs, or gallops  GASTROINTESTINAL: Soft, non-tender, non-distended; bowel sounds present  MUSCULOSKELETAL: 2+ peripheral pulses; no clubbing, no cyanosis, no edema  NEUROLOGY: non-focal  SKIN: No rashes or lesions; warm and dry     TONJA PECK 85y Male  MRN#: [id number]   Hospital Day: 5d    SUBJECTIVE  Patient is a 85y old Male who presents with a chief complaint of AMS (23 Mar 2025 15:53)  Currently admitted to medicine with the primary diagnosis of Acute UTI      INTERVAL HPI AND OVERNIGHT EVENTS:  Patient was examined and seen at bedside. This morning he is resting comfortably in bed.    REVIEW OF SYMPTOMS:  poor historian, when asked questions keeps repeating "alright" and smiling     OBJECTIVE  PAST MEDICAL & SURGICAL HISTORY  Afib    CHF (congestive heart failure)    Hypertension    S/P CABG (coronary artery bypass graft)      ALLERGIES:  No Known Allergies    MEDICATIONS:  STANDING MEDICATIONS  aMIOdarone    Tablet 100 milliGRAM(s) Oral <User Schedule>  atorvastatin 10 milliGRAM(s) Oral at bedtime  chlorhexidine 2% Cloths 1 Application(s) Topical <User Schedule>  finasteride 5 milliGRAM(s) Oral daily  meropenem  IVPB 2000 milliGRAM(s) IV Intermittent every 8 hours  pantoprazole    Tablet 40 milliGRAM(s) Oral before breakfast  QUEtiapine 12.5 milliGRAM(s) Oral at bedtime  sertraline 50 milliGRAM(s) Oral daily  sodium chloride 0.9%. 1000 milliLiter(s) IV Continuous <Continuous>  tamsulosin 0.4 milliGRAM(s) Oral at bedtime    PRN MEDICATIONS  acetaminophen     Tablet .. 650 milliGRAM(s) Oral every 6 hours PRN  acetaminophen  Suppository .. 650 milliGRAM(s) Rectal every 6 hours PRN  ondansetron Injectable 4 milliGRAM(s) IV Push every 6 hours PRN  senna 2 Tablet(s) Oral at bedtime PRN      VITAL SIGNS: Last 24 Hours  T(C): 37.3 (24 Mar 2025 06:19), Max: 38.4 (23 Mar 2025 20:12)  T(F): 99.2 (24 Mar 2025 06:19), Max: 101.2 (23 Mar 2025 20:12)  HR: 72 (24 Mar 2025 06:19) (67 - 72)  BP: 156/84 (24 Mar 2025 06:19) (154/70 - 161/63)  BP(mean): --  RR: 18 (24 Mar 2025 06:19) (18 - 18)  SpO2: 97% (24 Mar 2025 06:19) (94% - 97%)    LABS:                        9.3    12.32 )-----------( 335      ( 24 Mar 2025 06:45 )             26.9     03-23    150[H]  |  117[H]  |  36[H]  ----------------------------<  114[H]  5.4[H]   |  26  |  0.8    Ca    8.6      23 Mar 2025 04:30        Urinalysis Basic - ( 23 Mar 2025 04:30 )    Color: x / Appearance: x / SG: x / pH: x  Gluc: 114 mg/dL / Ketone: x  / Bili: x / Urobili: x   Blood: x / Protein: x / Nitrite: x   Leuk Esterase: x / RBC: x / WBC x   Sq Epi: x / Non Sq Epi: x / Bacteria: x                RADIOLOGY:  TTE   1. Normal global left ventricular systolic function.   2. LV Ejection Fraction by Cazares's Method with a biplane EF of 52 %.   3. Severe concentric left ventricular hypertrophy.   4. Abnormal septal motion consistent with left bundle branch block.   5. The left ventricular diastolic function could not be assessedin this   study.   6. Normal right ventricular size and function.   7. Severely enlarged left atrium. LA volume Index is 45.6 ml/m².   8. Normal right atrial size.   9. Bioprosthetic aortic valve with significant stenosis with PV 4.1 m/s,   PG 67 mmHg, 37 mmHg, EOA 1.33 cm2, EOAi 0.74 cm2/m2. Moderate prosthetic   valve regurgitation.  10. Moderate thickening and calcification of the posterior mitral valve   leaflet.  11. Possible small, mobile vegetation on atrial-side of anterior leaflet   of mitral valve.  12. Mild mitral valve regurgitation.  13. Mild pulmonic valve regurgitation.  14. Dilatation of the ascending aorta, measures 3.8 cm.  15. Normal pulmonary artery pressure.  16. There is no evidence of pericardial effusion.      PHYSICAL EXAM:  CONSTITUTIONAL: No acute distress, thin elderly man, sleeping but easy to wake and cooperative   HEAD: Atraumatic, normocephalic  EYES: EOM intact, conjunctiva and sclera clear  ENT: no thyromegaly, no bruits, no JVD; moist mucous membranes  PULMONARY: no wheezes, rales, or rhonchi  CARDIOVASCULAR: Regular rate and rhythm; no murmurs, rubs, or gallops  GASTROINTESTINAL: Soft, non-tender, non-distended; bowel sounds present  MUSCULOSKELETAL: 2+ peripheral pulses; no clubbing, no cyanosis  NEUROLOGY: confused but cooperative, tries to follow simple commands   SKIN: No rashes or lesions; warm and dry

## 2025-03-24 NOTE — SWALLOW BEDSIDE ASSESSMENT ADULT - DIET PRIOR TO ADMI
soft solids (eggs, chili) and thin liquids

## 2025-03-24 NOTE — SWALLOW BEDSIDE ASSESSMENT ADULT - SWALLOW EVAL: RECOMMENDED DIET
minced + moist consistency and mildly thick liquids via cup sips. No straws. Allow time for pt to swallow
minced + moist consistency and mildly thick liquids via cup sips. No straws. Allow time for pt to swallow
puree consistency and mildly thick liquids via cup sips ONLY WHEN AWAKE/ALERT. No straws. Allow time for pt to swallow
puree consistency and mildly thick liquids via cup sips. No straws. Allow time for pt to swallow

## 2025-03-24 NOTE — PROGRESS NOTE ADULT - ASSESSMENT
Mr Castorena is a 85 YOM w a PMH of alzheimer's dementia with hallucination (followed by Dr. Juan Neurologist), HFpEF, HTN, paroxysmal Afib on xarelto , Aortic Stenosis s/p AV replacement; CAD s/p CABG and hyperlipidema presenting with worsening confusion      Metabolic Encephalopathy   pseudominas/ Gram Neg Rods bacteriemia   SIRS present on admission   Infectious from Acute cystitis and possibly Structural from Hematoma  Head CT: subacute to chronic subdural hematomas. No significant mass effect or midline shift.  Neurosurgery Re[place] but Family declined understanding risk of brain herniation and death   Patient on Xarelto for AFIB: continue to hold for now   Repeat Head CT: The density of the bilateral cerebral convexity and falcine subdural collections appears uniformly increased when compared to   the previous head CT probably reflecting IV contrast. Recommend short-term follow-up imaging.  Treat underline UTI and follow-up Ucx, and bcx   Neuro checks Q8, keep NPO pending speech therapy consult   Will Repeat  Head CT in Am for further evaluation >>Pending   ID consult appreciated  -Follow up with blood and urine cultures.   -per discussion w ID change Zosyn 4.5 gram q 8 hours. (S/D of abx 3/19) to Meropenem (day 1 3/23)   -patient febrile to 101.2 over night   -f/u TTE    Subdural Hematoma vs hygroma  case discussed w NeuroSgx who recomended NeuroIR  f/u NeuroIR consult  PER NEUROSGX HOLD AC for MIN 4 weeks       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  AORTIC Stenosis s/p AV replacement   Paroxysmal AFIB + HTN   HLD   -BP on Lower side, Hold Antihypertensives and continue IVF   -Continue Amiodarone , Statin, and DASH    BPH: continue Finasteride  and Flomax   Dementia: continue Seroquel and Zoloft with  daily re-orientation     DVT PPX: Xarelto on hold for Hematoma MUST BE HELD 4 WEEKS PER NEUROSGX, SCD's for now   GI PPX: PPI   DNI/DNR   Dispo: From Home   Pending Clinical Improvement, acute                Mr Castorena is a 85 YOM w a PMH of alzheimer's dementia with hallucination (followed by Dr. Juan Neurologist), HFpEF, HTN, paroxysmal Afib on xarelto , Aortic Stenosis s/p AV replacement; CAD s/p CABG and hyperlipidema presenting with worsening confusion    TTE findings discussed w cardiologist (Dr Benavidez) and ID (Dr Benz)     Metabolic Encephalopathy   pseudominas/ Gram Neg Rods bacteriemia   SIRS present on admission   Infectious from Acute cystitis and possibly Structural from Hematoma  Head CT: subacute to chronic subdural hematomas. No significant mass effect or midline shift.  Neurosurgery Recommended Transfer to [place] but Family declined understanding risk of brain herniation and death   Patient on Xarelto for AFIB: continue to hold for now   Repeat Head CT: The density of the bilateral cerebral convexity and falcine subdural collections appears uniformly increased when compared to   the previous head CT probably reflecting IV contrast. Recommend short-term follow-up imaging.  Treat underline UTI and follow-up Ucx, and bcx   Neuro checks Q8, keep NPO pending speech therapy consult   Will Repeat  Head CT in Am for further evaluation >>Pending   ID consult appreciated  -Follow up with blood and urine cultures.   -per discussion w ID change Zosyn 4.5 gram q 8 hours. (S/D of abx 3/19) to Meropenem (day 1 3/23)   -patient febrile to 101.2 over night   -TTE:  1. Normal global left ventricular systolic function.   2. LV Ejection Fraction by Cazares's Method with a biplane EF of 52 %.   3. Severe concentric left ventricular hypertrophy.   4. Abnormal septal motion consistent with left bundle branch block.   5. The left ventricular diastolic function could not be assessedin this   study.   6. Normal right ventricular size and function.   7. Severely enlarged left atrium. LA volume Index is 45.6 ml/m².   8. Normal right atrial size.   9. Bioprosthetic aortic valve with significant stenosis with PV 4.1 m/s,   PG 67 mmHg, 37 mmHg, EOA 1.33 cm2, EOAi 0.74 cm2/m2. Moderate prosthetic   valve regurgitation.  10. Moderate thickening and calcification of the posterior mitral valve   leaflet.  11. Possible small, mobile vegetation on atrial-side of anterior leaflet   of mitral valve.  12. Mild mitral valve regurgitation.  13. Mild pulmonic valve regurgitation.  14. Dilatation of the ascending aorta, measures 3.8 cm.  15. Normal pulmonary artery pressure.  16. There is no evidence of pericardial effusion.  PATIENT IS NOT A CANDIDATE FOR ALBERTO PER CARDIO, GIVEN BACTEREMIA, TTE FINDINGS, AND FEVER WILL ASSUME ENDOCARDITIS  -F/U ID FOR ABx RECS (WILL LIKELY NEED 6 WEEKS AND PICC  -F/U       Subdural Hematoma vs hygroma  case discussed w NeuroSgx who recomended NeuroIR  f/u NeuroIR consult  PER NEUROSGX HOLD AC for MIN 4 weeks       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  AORTIC Stenosis s/p AV replacement   Paroxysmal AFIB + HTN   HLD   -BP on Lower side, Hold Antihypertensives and continue IVF   -Continue Amiodarone , Statin, and DASH    BPH: continue Finasteride  and Flomax   Dementia: continue Seroquel and Zoloft with  daily re-orientation     DVT PPX: Xarelto on hold for Hematoma MUST BE HELD 4 WEEKS PER NEUROSGX, SCD's for now   GI PPX: PPI   DNI/DNR   Dispo: From Home   Pending Clinical Improvement, acute                Mr Castorena is a 85 YOM w a PMH of alzheimer's dementia with hallucination (followed by Dr. Juan Neurologist), HFpEF, HTN, paroxysmal Afib on xarelto , Aortic Stenosis s/p AV replacement; CAD s/p CABG and hyperlipidema presenting with worsening confusion    TTE findings discussed w cardiologist (Dr Benavidez) and ID (Dr Benz)     Metabolic Encephalopathy   pseudominas/ Gram Neg Rods bacteriemia   SIRS present on admission   Infectious from Acute cystitis and possibly Structural from Hematoma  Head CT: subacute to chronic subdural hematomas. No significant mass effect or midline shift.  Neurosurgery Recommended Transfer to [place] but Family declined understanding risk of brain herniation and death   Patient on Xarelto for AFIB: continue to hold for now   Repeat Head CT: The density of the bilateral cerebral convexity and falcine subdural collections appears uniformly increased when compared to   the previous head CT probably reflecting IV contrast. Recommend short-term follow-up imaging.  Treat underline UTI and follow-up Ucx, and bcx   Neuro checks Q8, keep NPO pending speech therapy consult   Will Repeat  Head CT in Am for further evaluation >>Pending   ID consult appreciated  -Follow up with blood and urine cultures.   -per discussion w ID change Zosyn 4.5 gram q 8 hours. (S/D of abx 3/19) to Meropenem (day 1 3/23)   -patient febrile to 101.2 over night   -TTE:  1. Normal global left ventricular systolic function.   2. LV Ejection Fraction by Cazares's Method with a biplane EF of 52 %.   3. Severe concentric left ventricular hypertrophy.   4. Abnormal septal motion consistent with left bundle branch block.   5. The left ventricular diastolic function could not be assessedin this   study.   6. Normal right ventricular size and function.   7. Severely enlarged left atrium. LA volume Index is 45.6 ml/m².   8. Normal right atrial size.   9. Bioprosthetic aortic valve with significant stenosis with PV 4.1 m/s,   PG 67 mmHg, 37 mmHg, EOA 1.33 cm2, EOAi 0.74 cm2/m2. Moderate prosthetic   valve regurgitation.  10. Moderate thickening and calcification of the posterior mitral valve   leaflet.  11. Possible small, mobile vegetation on atrial-side of anterior leaflet   of mitral valve.  12. Mild mitral valve regurgitation.  13. Mild pulmonic valve regurgitation.  14. Dilatation of the ascending aorta, measures 3.8 cm.  15. Normal pulmonary artery pressure.  16. There is no evidence of pericardial effusion.  PATIENT IS NOT A CANDIDATE FOR ALBERTO PER CARDIO, GIVEN BACTEREMIA, TTE FINDINGS, AND FEVER WILL ASSUME ENDOCARDITIS  -F/U ID FOR ABx RECS         Subdural Hematoma vs hygroma  case discussed w NeuroSgx who recomended NeuroIR  f/u NeuroIR consult  PER NEUROSGX HOLD AC for MIN 4 weeks       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  AORTIC Stenosis s/p AV replacement   Paroxysmal AFIB + HTN   HLD   -BP on Lower side, Hold Antihypertensives and continue IVF   -Continue Amiodarone , Statin, and DASH    BPH: continue Finasteride  and Flomax   Dementia: continue Seroquel and Zoloft with  daily re-orientation     DVT PPX: Xarelto on hold for Hematoma MUST BE HELD 4 WEEKS PER NEUROSGX, SCD's for now   GI PPX: PPI   DNI/DNR   Dispo: From Home   Pending Clinical Improvement, acute

## 2025-03-24 NOTE — SWALLOW BEDSIDE ASSESSMENT ADULT - ASR SWALLOW RECOMMEND DIAG
Family declined- not within GOC.

## 2025-03-24 NOTE — CHART NOTE - NSCHARTNOTEFT_GEN_A_CORE
The neuroendovascular team was contacted at [facility] (formerly [facility] due to the finding of bilateral, mixed density subdural collections on CTH, which have been stable as of 3/21/25. They were found to measure .9cm on the left and .7cm on the right without significant midline shift. The patient is reportedly AAOx0 with advanced dementia at baseline and may have fallen several months ago. Blood cultures were +for Pseudomonas aeruginosa and the patient has been febrile (now on IV Meropenem). The patient's son, Carlos, was called with an update regarding potential procedural planning and risks vs benefits of an MMA embolization (also given the patient's DNR/DNI status). He was in agreement with the plan below. At baseline, the patient requires 24/7 care. Pertinent patient clinical history and radiological imaging have been reviewed by Dr. Morales. No in-person neuroendovascular evaluation is possible while the patient remains at [facility].   - Potential procedural planning will be delayed until the patient's infectious workup has been completed.   - GOC discussion is recommended prior to any procedural planning (please contact us when this is completed).   [id number] Neuroendovascular if the patient is deemed clinically stable enough to undergo the procedure or if there are additional questions or concerns on this admission

## 2025-03-24 NOTE — PROGRESS NOTE ADULT - SUBJECTIVE AND OBJECTIVE BOX
HPI:  83 y/o man with PMH of late onset alzheimer's dementia with hallucination (followed by Dr. Juan Neurologist), paroxysmal Afib on xarelto ,   Aortic Stenosis s/p AV replacement; CAD s/p CABG, CHF,  HTN and hyperlipidema presenting with AMS that has been going on for the past 3 days.  fever noticed in the ed.  Patient is lethargic.  unable to obtain ros/hx  as per wife, patient might has fell several months ago  In course of hospitalization, pt found to have SDH while on DOAC.  Currently being on hold.   Patient declined transfer to [place] for neurosurgery evaluation with full understanding of risks, benefits, and alteratives  discussed case with neuro surgery who recommended to hold off the anticoagulation, and head CT scan in 6 hrs      (19 Mar 2025 23:59)    INFECTIOUS DISEASE HISTORY:  ID consulted for antimicrobial recommendations.     Prior hospital charts reviewed [Yes]  Primary team notes reviewed [Yes]  Other consultant notes reviewed [Yes]    REVIEW OF SYSTEMS:  CONSTITUTIONAL: No fever or chills  HEENT: No sore throat  RESPIRATORY: No cough, no shortness of breath  CARDIOVASCULAR: No chest pain or palpitations  GASTROINTESTINAL: No abdominal or epigastric pain  GENITOURINARY: No dysuria  NEUROLOGICAL: No headache/dizziness  MSK: No joint pain, erythema, or swelling; no back pain  SKIN: No itching, rashes  All other ROS negative except noted above    PAST MEDICAL & SURGICAL HISTORY:  Afib      CHF (congestive heart failure)      Hypertension      S/P CABG (coronary artery bypass graft)    SOCIAL HISTORY: cannot obtain further history from the patient secondary to altered mental status or sedation      FAMILY HISTORY: No pertinent PMH for first degree relatives.     ANTIMICROBIALS:      ANTIMICROBIALS (past 90 days):  MEDICATIONS  (STANDING):    cefepime   IVPB   100 mL/Hr IV Intermittent (03-19-25 @ 19:00)    cefepime   IVPB   100 mL/Hr IV Intermittent (03-20-25 @ 06:01)    vancomycin  IVPB   250 mL/Hr IV Intermittent (03-20-25 @ 00:43)        OTHER MEDS:   MEDICATIONS  (STANDING):  acetaminophen     Tablet .. 650 every 6 hours PRN  acetaminophen   IVPB .. 1000 once  aMIOdarone    Tablet 100 <User Schedule>  atorvastatin 10 at bedtime  finasteride 5 daily  ondansetron Injectable 4 every 6 hours PRN  pantoprazole    Tablet 40 before breakfast  QUEtiapine 12.5 at bedtime  senna 2 at bedtime PRN  sertraline 50 daily  tamsulosin 0.4 at bedtime      VITALS:  Vital Signs Last 24 Hrs  T(F): 98.7 (03-20-25 @ 14:25), Max: 103.8 (03-19-25 @ 23:57)    Vital Signs Last 24 Hrs  HR: 63 (03-20-25 @ 14:25) (63 - 80)  BP: 138/59 (03-20-25 @ 14:25) (127/67 - 170/72)  RR: 20 (03-20-25 @ 14:25)  SpO2: 96% (03-20-25 @ 14:25) (94% - 97%)  Wt(kg): --    EXAM:  GENERAL: Somnolent. Grimacing with sternal rub.   HEAD: No head lesions  NECK: Supple  CHEST/LUNG: Shallow breath sounds.   HEART: S1 S2  ABDOMEN: Soft, nontender.  EXTREMITIES: No clubbing  NERVOUS SYSTEM: Somnolent.   MSK: No joint erythema, swelling or pain  SKIN: No rashes or lesions, no superficial thrombophlebitis    Labs:                        10.8   12.94 )-----------( 212      ( 19 Mar 2025 18:30 )             31.5     03-19    143  |  106  |  29[H]  ----------------------------<  117[H]  4.4   |  27  |  1.1    Ca    8.3[L]      19 Mar 2025 18:30    TPro  6.0  /  Alb  3.2[L]  /  TBili  0.5  /  DBili  x   /  AST  28  /  ALT  31  /  AlkPhos  121[H]  03-19      WBC Trend:  WBC Count: 12.94 (03-19-25 @ 18:30)      Auto Neutrophil #: 3.96 K/uL (06-30-24 @ 07:21)  Auto Neutrophil #: 6.58 K/uL (06-28-24 @ 07:09)  Auto Neutrophil #: 6.55 K/uL (06-27-24 @ 06:53)  Auto Neutrophil #: 3.06 K/uL (06-25-24 @ 08:12)  Auto Neutrophil #: 3.41 K/uL (06-24-24 @ 18:40)            Trend LDH                INTERPRETATION:  CLINICAL INFORMATION: Fever, abdominal pain    COMPARISON: CT ABDOMEN/PELVIS 3/19/2025.    CONTRAST/COMPLICATIONS:  IV Contrast: Omnipaque 350  90 cc administered   10 cc discarded  Oral Contrast: NONE    PROCEDURE:  CT of the Abdomen and Pelvis was performed.  Sagittal and coronal reformats were performed.    FINDINGS:  LOWER CHEST: Bibasilar atelectasis. Poststernotomy. Cardiomegaly    LIVER: Within normal limits.  BILE DUCTS: Normal caliber.  GALLBLADDER: Cholelithiasis.  SPLEEN: Within normal limits.  PANCREAS: Within normal limits.  ADRENALS: Within normal limits.  KIDNEYS/URETERS: No renal stones or hydronephrosis.    BLADDER: Mild urinary bladder wall thickening  REPRODUCTIVE ORGANS: Unremarkable at CT.    BOWEL: Stool impaction the rectum measuring up to 9 cm. Mild degree of   perirectal fat stranding. Additional desiccated stool within the colon.  PERITONEUM/RETROPERITONEUM: Within normal limits.  VESSELS: Atherosclerotic changes with extensive visceral vascular   calcifications. Circumaortic left renal vein  LYMPH NODES: No lymphadenopathy.  ABDOMINAL WALL: Small fat-containing umbilical hernia  BONES: Osteopenia. Subacute/chronic left L2 transverse process deformity    IMPRESSION:  Stool impaction the rectum measuring up to 9 cm with mild to moderate   degree of perirectal fat stranding    --- End of Report ---            SCARLET SERVIN MD; Attending Radiologist  This document has been electronically signed. Mar 19 2025  8:22PM (03-19-25 @ 20:01)    < from: CT Head No Cont (03.20.25 @ 00:28) >  IMPRESSION:    Increased attenuation of the dural reflections and intracranial vessels   likely reflecting residual contrast from recent intravenous contrast   administration.    Redemonstration of bilateral hemispheric subdural collections likely   reflecting subacute to chronic subdural hematomas, grossly stable since   prior. No mass effect or midline shift.    ATTENDING ADDENDUM: The density of the bilateral cerebral convexity and   falcine subdural collections appears uniformly increased when compared to   the previous head CT probably reflecting IV contrast. Recommend   short-term follow-up imaging.    --- End of Report ---    < end of copied text >  < from: CT Head No Cont (03.19.25 @ 19:53) >  Motion limited examination.    Predominantly low density bilateral subdural collections are noted likely   reflecting subacute to chronic subdural hematomas. No significant mass   effect or midline shift.    < end of copied text >  < from: CT Chest w/ IV Cont (06.24.24 @ 20:14) >  IMPRESSION:    Cholelithiasis.    No evidence of thoracic, abdominal or pelvic visceral injury, laceration,   or hematoma.    No evidence of acute fracture.    --- End of Report ---    < end of copied text >

## 2025-03-24 NOTE — SWALLOW BEDSIDE ASSESSMENT ADULT - SWALLOW EVAL: DIAGNOSIS
Suspect some degree of pharyngeal dysphagia. +toleration for puree, minced + moist, and mildly thick liquids via controlled cup sips
Suspect some degree of pharyngeal dysphagia. Not appropriate for PO intake at this time. Increased lethargy. Decreased awareness of feeding situation.
Suspect some degree of pharyngeal dysphagia. +toleration of breakfast tray of minced + moist, and mildly thick liquids via controlled cup sips. Occasional coughing during meals.
Suspect some degree of pharyngeal dysphagia. +toleration observed without overt s/s of aspiration/penetration for puree and controlled cup sips of mildly thick liquids

## 2025-03-24 NOTE — SWALLOW BEDSIDE ASSESSMENT ADULT - SLP PERTINENT HISTORY OF CURRENT PROBLEM
83yo M PMH end stage Alzheimer's Disease with hallucinations, paroxysmal Atrial fibrillation (on Xarelto), Aortic Stenosois s/p AV replacement, CAD s/p CABG, CHF, HTN, HLD presented with worsened AMS x3 days. Admitted for sepsis, probable UTI. Head CT: subacute to chronic subdural hematomas. Neurosurgery Recommended Transfer to [place] but Family declined
85yo M PMH end stage Alzheimer's Disease with hallucinations, paroxysmal Atrial fibrillation (on Xarelto), Aortic Stenosois s/p AV replacement, CAD s/p CABG, CHF, HTN, HLD presented with worsened AMS x3 days. Admitted for sepsis, probable UTI. Head CT: subacute to chronic subdural hematomas. Neurosurgery Recommended Transfer to [place] but Family declined. Metabolic encephalopathy
83yo M PMH end stage Alzheimer's Disease with hallucinations, paroxysmal Atrial fibrillation (on Xarelto), Aortic Stenosois s/p AV replacement, CAD s/p CABG, CHF, HTN, HLD presented with worsened AMS x3 days. Admitted for sepsis, probable UTI. Head CT: subacute to chronic subdural hematomas. Neurosurgery Recommended Transfer to [place] but Family declined
yes
83yo M PMH end stage Alzheimer's Disease with hallucinations, paroxysmal Atrial fibrillation (on Xarelto), Aortic Stenosois s/p AV replacement, CAD s/p CABG, CHF, HTN, HLD presented with worsened AMS x3 days. Admitted for sepsis, probable UTI. Head CT: subacute to chronic subdural hematomas. Neurosurgery Recommended Transfer to [place] but Family declined. Metabolic encephalopathy

## 2025-03-24 NOTE — SWALLOW BEDSIDE ASSESSMENT ADULT - SWALLOW EVAL: RECOMMENDED FEEDING/EATING TECHNIQUES
allow for swallow between intakes/crush medication (when feasible)/no straws/oral hygiene/position upright (90 degrees)/small sips/bites

## 2025-03-24 NOTE — PROGRESS NOTE ADULT - ASSESSMENT
1. SDH on DOAC  agree with holding off doAC    holding off drainage with close monitor for expansion of hematoma    2.  late onset alzheimer's dementia with hallucination    3 paroxysmal Afib on xarelto     4  Aortic Stenosis s/p AV replacement; CAD s/p CABG, CHF,  HTN and hyperlipidema presenting with AMS    5. sepsis    Zosyn 3.375 gram q 8 hours. (S/D of abx 3/19)     will follow

## 2025-03-24 NOTE — SWALLOW BEDSIDE ASSESSMENT ADULT - SWALLOW EVAL: PATIENT/FAMILY GOALS STATEMENT
Spouse reported GOC are to prioritize feeding PO, no feeding tubes.

## 2025-03-24 NOTE — SWALLOW BEDSIDE ASSESSMENT ADULT - ADDITIONAL RECOMMENDATIONS
SLP d/c reconsult PRN
SLP to f/u to monitor PO tolerance.
Educated family on diet recs, feeding strategies, and POC. Understanding expressed.  SLP to f/u to monitor PO tolerance.
Educated family on diet recs, feeding strategies, and POC. Understanding expressed.  SLP to f/u to monitor PO tolerance.

## 2025-03-24 NOTE — SWALLOW BEDSIDE ASSESSMENT ADULT - NS ASR SWALLOW FINDINGS DISCUS
[id number], CHASITY Mccain/Physician/Nursing/Patient/Family
CHASITY Aranda/Physician/Nursing/Patient/Family
Physician/Nursing/Patient/Family
CHASITY Ham/Physician/Nursing/Patient/Family

## 2025-03-24 NOTE — SWALLOW BEDSIDE ASSESSMENT ADULT - SLP GENERAL OBSERVATIONS
Pt received awake. PCA feeding pt. breakfast tray. Occasional coughing noted.
Pt received asleep, arousable, mostly kept eyes closed, minimally verbal, +whole body rigors +restless. O2 via room air, NAD.
Pt received asleep, arousable, mostly kept eyes closed, minimally verbal, +whole body rigors +restless. O2 via room air, NAD.
Pt received asleep, arousable, mostly kept eyes closed, minimally verbal, +cooling blanket, O2 via room air, NAD.

## 2025-03-25 LAB
ALBUMIN SERPL ELPH-MCNC: 2.7 G/DL — LOW (ref 3.5–5.2)
ALP SERPL-CCNC: 121 U/L — HIGH (ref 30–115)
ALT FLD-CCNC: 36 U/L — SIGNIFICANT CHANGE UP (ref 0–41)
ANION GAP SERPL CALC-SCNC: 8 MMOL/L — SIGNIFICANT CHANGE UP (ref 7–14)
AST SERPL-CCNC: 38 U/L — SIGNIFICANT CHANGE UP (ref 0–41)
BILIRUB SERPL-MCNC: 0.3 MG/DL — SIGNIFICANT CHANGE UP (ref 0.2–1.2)
BUN SERPL-MCNC: 31 MG/DL — HIGH (ref 10–20)
CALCIUM SERPL-MCNC: 8.5 MG/DL — SIGNIFICANT CHANGE UP (ref 8.4–10.5)
CHLORIDE SERPL-SCNC: 120 MMOL/L — HIGH (ref 98–110)
CO2 SERPL-SCNC: 26 MMOL/L — SIGNIFICANT CHANGE UP (ref 17–32)
CREAT SERPL-MCNC: 0.7 MG/DL — SIGNIFICANT CHANGE UP (ref 0.7–1.5)
CRP SERPL-MCNC: 129.9 MG/L — HIGH
EGFR: 90 ML/MIN/1.73M2 — SIGNIFICANT CHANGE UP
EGFR: 90 ML/MIN/1.73M2 — SIGNIFICANT CHANGE UP
ERYTHROCYTE [SEDIMENTATION RATE] IN BLOOD: 75 MM/HR — HIGH (ref 0–10)
GLUCOSE SERPL-MCNC: 109 MG/DL — HIGH (ref 70–99)
HCT VFR BLD CALC: 26.6 % — LOW (ref 42–52)
HGB BLD-MCNC: 9.2 G/DL — LOW (ref 14–18)
MCHC RBC-ENTMCNC: 31.1 PG — HIGH (ref 27–31)
MCHC RBC-ENTMCNC: 34.6 G/DL — SIGNIFICANT CHANGE UP (ref 32–37)
MCV RBC AUTO: 89.9 FL — SIGNIFICANT CHANGE UP (ref 80–94)
NRBC BLD AUTO-RTO: 0 /100 WBCS — SIGNIFICANT CHANGE UP (ref 0–0)
PLATELET # BLD AUTO: 362 K/UL — SIGNIFICANT CHANGE UP (ref 130–400)
PMV BLD: 10.9 FL — HIGH (ref 7.4–10.4)
POTASSIUM SERPL-MCNC: 4.5 MMOL/L — SIGNIFICANT CHANGE UP (ref 3.5–5)
POTASSIUM SERPL-SCNC: 4.5 MMOL/L — SIGNIFICANT CHANGE UP (ref 3.5–5)
PROT SERPL-MCNC: 5.5 G/DL — LOW (ref 6–8)
RBC # BLD: 2.96 M/UL — LOW (ref 4.7–6.1)
RBC # FLD: 17.1 % — HIGH (ref 11.5–14.5)
SODIUM SERPL-SCNC: 154 MMOL/L — HIGH (ref 135–146)
WBC # BLD: 11.67 K/UL — HIGH (ref 4.8–10.8)
WBC # FLD AUTO: 11.67 K/UL — HIGH (ref 4.8–10.8)

## 2025-03-25 PROCEDURE — 51702 INSERT TEMP BLADDER CATH: CPT

## 2025-03-25 PROCEDURE — 99233 SBSQ HOSP IP/OBS HIGH 50: CPT

## 2025-03-25 RX ORDER — NIFEDIPINE 30 MG
10 TABLET, EXTENDED RELEASE 24 HR ORAL
Refills: 0 | Status: DISCONTINUED | OUTPATIENT
Start: 2025-03-25 | End: 2025-03-25

## 2025-03-25 RX ORDER — AMLODIPINE BESYLATE 10 MG/1
2.5 TABLET ORAL DAILY
Refills: 0 | Status: DISCONTINUED | OUTPATIENT
Start: 2025-03-25 | End: 2025-03-26

## 2025-03-25 RX ORDER — FUROSEMIDE 10 MG/ML
20 INJECTION INTRAMUSCULAR; INTRAVENOUS
Refills: 0 | Status: DISCONTINUED | OUTPATIENT
Start: 2025-03-25 | End: 2025-03-26

## 2025-03-25 RX ADMIN — FINASTERIDE 5 MILLIGRAM(S): 1 TABLET, FILM COATED ORAL at 13:23

## 2025-03-25 RX ADMIN — TAMSULOSIN HYDROCHLORIDE 0.4 MILLIGRAM(S): 0.4 CAPSULE ORAL at 21:27

## 2025-03-25 RX ADMIN — SODIUM CHLORIDE 75 MILLILITER(S): 9 INJECTION, SOLUTION INTRAVENOUS at 08:56

## 2025-03-25 RX ADMIN — FUROSEMIDE 20 MILLIGRAM(S): 10 INJECTION INTRAMUSCULAR; INTRAVENOUS at 21:28

## 2025-03-25 RX ADMIN — Medication 1 APPLICATION(S): at 05:06

## 2025-03-25 RX ADMIN — AMLODIPINE BESYLATE 2.5 MILLIGRAM(S): 10 TABLET ORAL at 18:32

## 2025-03-25 RX ADMIN — MEROPENEM 280 MILLIGRAM(S): 1 INJECTION INTRAVENOUS at 13:24

## 2025-03-25 RX ADMIN — FUROSEMIDE 20 MILLIGRAM(S): 10 INJECTION INTRAMUSCULAR; INTRAVENOUS at 13:24

## 2025-03-25 RX ADMIN — MEROPENEM 280 MILLIGRAM(S): 1 INJECTION INTRAVENOUS at 21:26

## 2025-03-25 RX ADMIN — SERTRALINE 50 MILLIGRAM(S): 100 TABLET, FILM COATED ORAL at 13:23

## 2025-03-25 RX ADMIN — QUETIAPINE FUMARATE 12.5 MILLIGRAM(S): 25 TABLET ORAL at 21:28

## 2025-03-25 RX ADMIN — AMIODARONE HYDROCHLORIDE 100 MILLIGRAM(S): 50 INJECTION, SOLUTION INTRAVENOUS at 08:56

## 2025-03-25 RX ADMIN — Medication 40 MILLIGRAM(S): at 05:08

## 2025-03-25 RX ADMIN — ATORVASTATIN CALCIUM 10 MILLIGRAM(S): 80 TABLET, FILM COATED ORAL at 21:27

## 2025-03-25 RX ADMIN — MEROPENEM 280 MILLIGRAM(S): 1 INJECTION INTRAVENOUS at 05:05

## 2025-03-25 NOTE — PROGRESS NOTE ADULT - ASSESSMENT
Mr Castorena is a 85 YOM w a PMH of alzheimer's dementia with hallucination (followed by Dr. Juan Neurologist), HFpEF, HTN, paroxysmal Afib on xarelto , Aortic Stenosis s/p AV replacement; CAD s/p CABG and hyperlipidema presenting with worsening confusion    F/U URO, PICC LINE PLACEMENT, NEUROENDO REGARDING PLAN FOR HYGROMA ([id number])    case discussed w ID (Dr Benz) and urology ([id number]), neuroendo (marisa)son and wife extensively updated via phone and bedside     Metabolic Encephalopathy   pseudominas/ Gram Neg Rods bacteriemia   SIRS present on admission   Infectious from Acute cystitis and possibly Structural from Hematoma  Head CT: subacute to chronic subdural hematomas. No significant mass effect or midline shift.  [place] but Family declined understanding risk of brain herniation and death   Patient on Xarelto for AFIB: continue to hold for now   Repeat Head CT: The density of the bilateral cerebral convexity and falcine subdural collections appears uniformly increased when compared to   the previous head CT probably reflecting IV contrast. Recommend short-term follow-up imaging.  Treat underline UTI and follow-up Ucx, and bcx   Neuro checks Q8, keep NPO pending speech therapy consult   Will Repeat  Head CT in Am for further evaluation >>Pending   ID consult appreciated  -Follow up with blood and urine cultures.   -per discussion w ID change Zosyn 4.5 gram q 8 hours. (S/D of abx 3/19) to Meropenem (day 1 3/23)   -patient febrile to 101.2 over night   -TTE:  1. Normal global left ventricular systolic function.   2. LV Ejection Fraction by Cazares's Method with a biplane EF of 52 %.   3. Severe concentric left ventricular hypertrophy.   4. Abnormal septal motion consistent with left bundle branch block.   5. The left ventricular diastolic function could not be assessedin this   study.   6. Normal right ventricular size and function.   7. Severely enlarged left atrium. LA volume Index is 45.6 ml/m².   8. Normal right atrial size.   9. Bioprosthetic aortic valve with significant stenosis with PV 4.1 m/s,   PG 67 mmHg, 37 mmHg, EOA 1.33 cm2, EOAi 0.74 cm2/m2. Moderate prosthetic   valve regurgitation.  10. Moderate thickening and calcification of the posterior mitral valve   leaflet.  11. Possible small, mobile vegetation on atrial-side of anterior leaflet   of mitral valve.  12. Mild mitral valve regurgitation.  13. Mild pulmonic valve regurgitation.  14. Dilatation of the ascending aorta, measures 3.8 cm.  15. Normal pulmonary artery pressure.  16. There is no evidence of pericardial effusion.  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  AORTIC Stenosis s/p AV replacement   Paroxysmal AFIB + HTN   HLD   -BP on Lower side, Hold Antihypertensives and continue IVF   -Continue Amiodarone , Statin, and DASH    BPH:  continue Finasteride  and Flomax  patient w minimal urine output and keeps removing condom cath  bladder scan >650  nurse attempted to place indwelling foly however nurse unable to place due to resistance and blood  urology called and consultplaced, may need coude cath      Dementia: continue Seroquel and Zoloft with  daily re-orientation     DVT PPX: Xarelto on hold for Hematoma MUST BE HELD 4 WEEKS PER NEUROSGX, SCD's for now   GI PPX: PPI   DNI/DNR   Dispo: From Home   Pending Clinical Improvement, acute                Mr Castorena is a 85 YOM w a PMH of alzheimer's dementia with hallucination (followed by Dr. Juan Neurologist), HFpEF, HTN, paroxysmal Afib on xarelto , Aortic Stenosis s/p AV replacement; CAD s/p CABG and hyperlipidema presenting with worsening confusion    F/U URO, PICC LINE PLACEMENT, NEUROENDO REGARDING PLAN FOR HYGROMA ([id number])    case discussed w ID (Dr Benz) and urology ([id number]), neuroendo (marisa)son and wife extensively updated via phone and bedside     Metabolic Encephalopathy   pseudominas/ Gram Neg Rods bacteriemia   SIRS present on admission   Infectious from Acute cystitis and possibly Structural from Hematoma  Head CT: subacute to chronic subdural hematomas. No significant mass effect or midline shift.  Neurosurgery [place] but Family declined understanding risk of brain herniation and death   Patient on Xarelto for AFIB: continue to hold for now   Repeat Head CT: The density of the bilateral cerebral convexity and falcine subdural collections appears uniformly increased when compared to   the previous head CT probably reflecting IV contrast. Recommend short-term follow-up imaging.  Treat underline UTI and follow-up Ucx, and bcx   Neuro checks Q8, keep NPO pending speech therapy consult   Will Repeat  Head CT in Am for further evaluation >>Pending   ID consult appreciated  -Follow up with blood and urine cultures.   -per discussion w ID change Zosyn 4.5 gram q 8 hours. (S/D of abx 3/19) to Meropenem (day 1 3/23)   -patient febrile to 101.2 over night   -TTE:  1. Normal global left ventricular systolic function.   2. LV Ejection Fraction by Cazares's Method with a biplane EF of 52 %.   3. Severe concentric left ventricular hypertrophy.   4. Abnormal septal motion consistent with left bundle branch block.   5. The left ventricular diastolic function could not be assessedin this   study.   6. Normal right ventricular size and function.   7. Severely enlarged left atrium. LA volume Index is 45.6 ml/m².   8. Normal right atrial size.   9. Bioprosthetic aortic valve with significant stenosis with PV 4.1 m/s,   PG 67 mmHg, 37 mmHg, EOA 1.33 cm2, EOAi 0.74 cm2/m2. Moderate prosthetic   valve regurgitation.  10. Moderate thickening and calcification of the posterior mitral valve   leaflet.  11. Possible small, mobile vegetation on atrial-side of anterior leaflet   of mitral valve.  12. Mild mitral valve regurgitation.  13. Mild pulmonic valve regurgitation.  14. Dilatation of the ascending aorta, measures 3.8 cm.  15. Normal pulmonary artery pressure.  16. There is no evidence of pericardial effusion.  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  AORTIC Stenosis s/p AV replacement   Paroxysmal AFIB + HTN   HLD   -BP on Lower side, Hold Antihypertensives and continue IVF   -Continue Amiodarone , Statin, and DASH    BPH:  continue Finasteride  and Flomax  patient w minimal urine output and keeps removing condom cath  bladder scan >650  nurse attempted to place indwelling foly however nurse unable to place due to resistance and blood  urology called and consultplaced, may need coude cath      Dementia: continue Seroquel and Zoloft with  daily re-orientation     DVT PPX: Xarelto on hold for Hematoma MUST BE HELD 4 WEEKS PER NEUROSGX, SCD's for now   GI PPX: PPI   DNI/DNR   Dispo: From Home   Pending Clinical Improvement, acute                Mr Castorena is a 85 YOM w a PMH of alzheimer's dementia with hallucination (followed by Dr. Juan Neurologist), HFpEF, HTN, paroxysmal Afib on xarelto , Aortic Stenosis s/p AV replacement; CAD s/p CABG and hyperlipidema presenting with worsening confusion    F/U URO, PICC LINE PLACEMENT planned for THURSDAY (not on AC as per neuro recs) , NEUROENDO REGARDING PLAN FOR HYGROMA ([id number])    case discussed w ID (Dr Benz) and urology ([id number]), neuroendo (marisa)son and wife extensively updated via phone and bedside     Metabolic Encephalopathy   pseudominas/ Gram Neg Rods bacteriemia   SIRS present on admission   Infectious from Acute cystitis and possibly Structural from Hematoma  Head CT: subacute to chronic subdural hematomas. No significant mass effect or midline shift.  Neurosurgery Recommended Transfer to [place] but Family declined understanding risk of brain herniation and death   Patient on Xarelto for AFIB: continue to hold for now   Repeat Head CT: The density of the bilateral cerebral convexity and falcine subdural collections appears uniformly increased when compared to   the previous head CT probably reflecting IV contrast. Recommend short-term follow-up imaging.  Treat underline UTI and follow-up Ucx, and bcx   Neuro checks Q8, keep NPO pending speech therapy consult   Will Repeat  Head CT in Am for further evaluation >>Pending   ID consult appreciated  -Follow up with blood and urine cultures.   -per discussion w ID change Zosyn 4.5 gram q 8 hours. (S/D of abx 3/19) to Meropenem (day 1 3/23)   -patient febrile to 101.2 over night   -TTE:  1. Normal global left ventricular systolic function.   2. LV Ejection Fraction by Cazares's Method with a biplane EF of 52 %.   3. Severe concentric left ventricular hypertrophy.   4. Abnormal septal motion consistent with left bundle branch block.   5. The left ventricular diastolic function could not be assessedin this   study.   6. Normal right ventricular size and function.   7. Severely enlarged left atrium. LA volume Index is 45.6 ml/m².   8. Normal right atrial size.   9. Bioprosthetic aortic valve with significant stenosis with PV 4.1 m/s,   PG 67 mmHg, 37 mmHg, EOA 1.33 cm2, EOAi 0.74 cm2/m2. Moderate prosthetic   valve regurgitation.  10. Moderate thickening and calcification of the posterior mitral valve   leaflet.  11. Possible small, mobile vegetation on atrial-side of anterior leaflet   of mitral valve.  12. Mild mitral valve regurgitation.  13. Mild pulmonic valve regurgitation.  14. Dilatation of the ascending aorta, measures 3.8 cm.  15. Normal pulmonary artery pressure.  16. There is no evidence of pericardial effusion.  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  AORTIC Stenosis s/p AV replacement   Paroxysmal AFIB + HTN   HLD   -BP on Lower side, Hold Antihypertensives and continue IVF   -Continue Amiodarone , Statin, and DASH    BPH:  continue Finasteride  and Flomax  patient w minimal urine output and keeps removing condom cath  bladder scan >650  nurse attempted to place indwelling foly however nurse unable to place due to resistance and blood  urology called and consultplaced, may need coude cath      Dementia: continue Seroquel and Zoloft with  daily re-orientation     DVT PPX: Xarelto on hold for Hematoma MUST BE HELD 4 WEEKS PER NEUROSGX, SCD's for now   GI PPX: PPI   DNI/DNR   Dispo: From Home   Pending Clinical Improvement, acute                Mr Castorena is a 85 YOM w a PMH of alzheimer's dementia with hallucination (followed by Dr. Juan Neurologist), HFpEF, HTN, paroxysmal Afib on xarelto , Aortic Stenosis s/p AV replacement; CAD s/p CABG and hyperlipidema presenting with worsening confusion    F/U URO, PICC LINE PLACEMENT planned for THURSDAY (not on AC as per neuro recs) , NEUROENDO REGARDING PLAN FOR HYGROMA ([id number])    case discussed w ID (Dr Benz) , neuroendo (marisa)son and wife extensively updated via phone and bedside     Metabolic Encephalopathy   pseudominas/ Gram Neg Rods bacteriemia   SIRS present on admission   Infectious from Acute cystitis and possibly Structural from Hematoma  Head CT: subacute to chronic subdural hematomas. No significant mass effect or midline shift.  Neurosurgery Recommended Transfer to [place] but Family declined understanding risk of brain herniation and death   Patient on Xarelto for AFIB: continue to hold for now   Repeat Head CT: The density of the bilateral cerebral convexity and falcine subdural collections appears uniformly increased when compared to   the previous head CT probably reflecting IV contrast. Recommend short-term follow-up imaging.  Treat underline UTI and follow-up Ucx, and bcx   Neuro checks Q8, keep NPO pending speech therapy consult   Will Repeat  Head CT in Am for further evaluation >>Pending   ID consult appreciated  -Follow up with blood and urine cultures.   -per discussion w ID change Zosyn 4.5 gram q 8 hours. (S/D of abx 3/19) to Meropenem (day 1 3/23)   -patient febrile to 101.2 over night   -TTE:  1. Normal global left ventricular systolic function.   2. LV Ejection Fraction by Cazares's Method with a biplane EF of 52 %.   3. Severe concentric left ventricular hypertrophy.   4. Abnormal septal motion consistent with left bundle branch block.   5. The left ventricular diastolic function could not be assessedin this   study.   6. Normal right ventricular size and function.   7. Severely enlarged left atrium. LA volume Index is 45.6 ml/m².   8. Normal right atrial size.   9. Bioprosthetic aortic valve with significant stenosis with PV 4.1 m/s,   PG 67 mmHg, 37 mmHg, EOA 1.33 cm2, EOAi 0.74 cm2/m2. Moderate prosthetic   valve regurgitation.  10. Moderate thickening and calcification of the posterior mitral valve   leaflet.  11. Possible small, mobile vegetation on atrial-side of anterior leaflet   of mitral valve.  12. Mild mitral valve regurgitation.  13. Mild pulmonic valve regurgitation.  14. Dilatation of the ascending aorta, measures 3.8 cm.  15. Normal pulmonary artery pressure.  16. There is no evidence of pericardial effusion.  PATIENT IS NOT A CANDIDATE FOR ALBERTO PER CARDIO, GIVEN BACTEREMIA, TTE FINDINGS, AND FEVER WILL ASSUME ENDOCARDITIS  -F/U ID FOR ABx RECS  -consult for PICC line placed, PLAN FOR PICC PLACEMENT THURSDAY 3/27     HTN   w realtive bradycardia  norvasc 2.5mg once daily strarted (Unable to crush nifedipine pills in pharmacy)   lasix 20mg once daily started     Subdural Hematoma vs hygroma  case discussed w NeuroSgx who recomended NeuroIR  f/u NeuroIR consult (when leaving will need to f/u 1-2 weeks later)  PER NEUROSGX HOLD AC for MIN 4 weeks   PLEASE UPDATE ON THURS/FRI if patient still here       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  AORTIC Stenosis s/p AV replacement   Paroxysmal AFIB + HTN   HLD   -BP on Lower side, Hold Antihypertensives and continue IVF   -Continue Amiodarone , Statin, and DASH    BPH:  continue Finasteride  and Flomax  patient w minimal urine output and keeps removing condom cath  bladder scan >650  nurse attempted to place indwelling foly however nurse unable to place due to resistance and blood  urology called and consultplaced, coude placed       Dementia: continue Seroquel and Zoloft with  daily re-orientation     DVT PPX: Xarelto on hold for Hematoma MUST BE HELD 4 WEEKS PER NEUROSGX, SCD's for now   GI PPX: PPI   DNI/DNR   Dispo: From Home   Pending Clinical Improvement, acute

## 2025-03-25 NOTE — PROGRESS NOTE ADULT - SUBJECTIVE AND OBJECTIVE BOX
HPI:  85 y/o man with PMH of late onset alzheimer's dementia with hallucination (followed by Dr. Juan Neurologist), paroxysmal Afib on xarelto ,   Aortic Stenosis s/p AV replacement; CAD s/p CABG, CHF,  HTN and hyperlipidema presenting with AMS that has been going on for the past 3 days.  fever noticed in the ed.  Patient is lethargic.  unable to obtain ros/hx  as per wife, patient might has fell several months ago  In course of hospitalization, pt found to have SDH while on DOAC.  Currently being on hold.   Patient declined transfer to [place] for neurosurgery evaluation with full understanding of risks, benefits, and alteratives  discussed case with neuro surgery who recommended to hold off the anticoagulation, and head CT scan in 6 hrs      (19 Mar 2025 23:59)    INFECTIOUS DISEASE HISTORY:  ID consulted for antimicrobial recommendations.     Prior hospital charts reviewed [Yes]  Primary team notes reviewed [Yes]  Other consultant notes reviewed [Yes]    REVIEW OF SYSTEMS:  CONSTITUTIONAL: No fever or chills  HEENT: No sore throat  RESPIRATORY: No cough, no shortness of breath  CARDIOVASCULAR: No chest pain or palpitations  GASTROINTESTINAL: No abdominal or epigastric pain  GENITOURINARY: No dysuria  NEUROLOGICAL: No headache/dizziness  MSK: No joint pain, erythema, or swelling; no back pain  SKIN: No itching, rashes  All other ROS negative except noted above    PAST MEDICAL & SURGICAL HISTORY:  Afib      CHF (congestive heart failure)      Hypertension      S/P CABG (coronary artery bypass graft)    SOCIAL HISTORY: cannot obtain further history from the patient secondary to altered mental status or sedation      FAMILY HISTORY: No pertinent PMH for first degree relatives.     ANTIMICROBIALS:      ANTIMICROBIALS (past 90 days):  MEDICATIONS  (STANDING):    cefepime   IVPB   100 mL/Hr IV Intermittent (03-19-25 @ 19:00)    cefepime   IVPB   100 mL/Hr IV Intermittent (03-20-25 @ 06:01)    vancomycin  IVPB   250 mL/Hr IV Intermittent (03-20-25 @ 00:43)        OTHER MEDS:   MEDICATIONS  (STANDING):  acetaminophen     Tablet .. 650 every 6 hours PRN  acetaminophen   IVPB .. 1000 once  aMIOdarone    Tablet 100 <User Schedule>  atorvastatin 10 at bedtime  finasteride 5 daily  ondansetron Injectable 4 every 6 hours PRN  pantoprazole    Tablet 40 before breakfast  QUEtiapine 12.5 at bedtime  senna 2 at bedtime PRN  sertraline 50 daily  tamsulosin 0.4 at bedtime      VITALS:  Vital Signs Last 24 Hrs  T(F): 98.7 (03-20-25 @ 14:25), Max: 103.8 (03-19-25 @ 23:57)    Vital Signs Last 24 Hrs  HR: 63 (03-20-25 @ 14:25) (63 - 80)  BP: 138/59 (03-20-25 @ 14:25) (127/67 - 170/72)  RR: 20 (03-20-25 @ 14:25)  SpO2: 96% (03-20-25 @ 14:25) (94% - 97%)  Wt(kg): --    EXAM:  GENERAL: Somnolent. Grimacing with sternal rub.   HEAD: No head lesions  NECK: Supple  CHEST/LUNG: Shallow breath sounds.   HEART: S1 S2  ABDOMEN: Soft, nontender.  EXTREMITIES: No clubbing  NERVOUS SYSTEM: Somnolent.   MSK: No joint erythema, swelling or pain  SKIN: No rashes or lesions, no superficial thrombophlebitis    Labs:                        10.8   12.94 )-----------( 212      ( 19 Mar 2025 18:30 )             31.5   >> hb 8     03-19    143  |  106  |  29[H]  ----------------------------<  117[H]  4.4   |  27  |  1.1    Ca    8.3[L]      19 Mar 2025 18:30    TPro  6.0  /  Alb  3.2[L]  /  TBili  0.5  /  DBili  x   /  AST  28  /  ALT  31  /  AlkPhos  121[H]  03-19      WBC Trend:  WBC Count: 12.94 (03-19-25 @ 18:30)      Auto Neutrophil #: 3.96 K/uL (06-30-24 @ 07:21)  Auto Neutrophil #: 6.58 K/uL (06-28-24 @ 07:09)  Auto Neutrophil #: 6.55 K/uL (06-27-24 @ 06:53)  Auto Neutrophil #: 3.06 K/uL (06-25-24 @ 08:12)  Auto Neutrophil #: 3.41 K/uL (06-24-24 @ 18:40)            Trend LDH                INTERPRETATION:  CLINICAL INFORMATION: Fever, abdominal pain    COMPARISON: CT ABDOMEN/PELVIS 3/19/2025.    CONTRAST/COMPLICATIONS:  IV Contrast: Omnipaque 350  90 cc administered   10 cc discarded  Oral Contrast: NONE    PROCEDURE:  CT of the Abdomen and Pelvis was performed.  Sagittal and coronal reformats were performed.    FINDINGS:  LOWER CHEST: Bibasilar atelectasis. Poststernotomy. Cardiomegaly    LIVER: Within normal limits.  BILE DUCTS: Normal caliber.  GALLBLADDER: Cholelithiasis.  SPLEEN: Within normal limits.  PANCREAS: Within normal limits.  ADRENALS: Within normal limits.  KIDNEYS/URETERS: No renal stones or hydronephrosis.    BLADDER: Mild urinary bladder wall thickening  REPRODUCTIVE ORGANS: Unremarkable at CT.    BOWEL: Stool impaction the rectum measuring up to 9 cm. Mild degree of   perirectal fat stranding. Additional desiccated stool within the colon.  PERITONEUM/RETROPERITONEUM: Within normal limits.  VESSELS: Atherosclerotic changes with extensive visceral vascular   calcifications. Circumaortic left renal vein  LYMPH NODES: No lymphadenopathy.  ABDOMINAL WALL: Small fat-containing umbilical hernia  BONES: Osteopenia. Subacute/chronic left L2 transverse process deformity    IMPRESSION:  Stool impaction the rectum measuring up to 9 cm with mild to moderate   degree of perirectal fat stranding    --- End of Report ---            SCARLET SERVIN MD; Attending Radiologist  This document has been electronically signed. Mar 19 2025  8:22PM (03-19-25 @ 20:01)    < from: CT Head No Cont (03.20.25 @ 00:28) >  IMPRESSION:    Increased attenuation of the dural reflections and intracranial vessels   likely reflecting residual contrast from recent intravenous contrast   administration.    Redemonstration of bilateral hemispheric subdural collections likely   reflecting subacute to chronic subdural hematomas, grossly stable since   prior. No mass effect or midline shift.    ATTENDING ADDENDUM: The density of the bilateral cerebral convexity and   falcine subdural collections appears uniformly increased when compared to   the previous head CT probably reflecting IV contrast. Recommend   short-term follow-up imaging.    --- End of Report ---    < end of copied text >  < from: CT Head No Cont (03.19.25 @ 19:53) >  Motion limited examination.    Predominantly low density bilateral subdural collections are noted likely   reflecting subacute to chronic subdural hematomas. No significant mass   effect or midline shift.    < end of copied text >  < from: CT Chest w/ IV Cont (06.24.24 @ 20:14) >  IMPRESSION:    Cholelithiasis.    No evidence of thoracic, abdominal or pelvic visceral injury, laceration,   or hematoma.    No evidence of acute fracture.    --- End of Report ---    < end of copied text >

## 2025-03-25 NOTE — CHART NOTE - NSCHARTNOTEFT_GEN_A_CORE
Registered Dietitian Follow-Up     Patient Profile Reviewed                           Yes X[]   No []     Nutrition History Previously Obtained        Yes [X]  No []       Pertinent  Information:     pt is 85 year old male with hx of late onset Alzheimer's with hallucinations, p.afib, aortic stenosis s/p AV replacement, CABG, CHF, HTN p/w AMS x 3 days and temp pt admitted with metabolic encephalopathy, SIRS, CTH with subacute to chronic SDH's. Gram negative rods bacteremia, + temps. pt is not a candidate for ALBERTO, assume endocarditis. PICC to be placed for abx on 3/27       Diet order:   Diet, Minced and Moist:   Mildly Thick Liquids (MILDTHICKLIQS)  Supplement Feeding Modality:  Oral  Ensure Plus High Protein Cans or Servings Per Day:  2       Frequency:  Two Times a day (25 @ 10:22) [Active]      HT: 177.8 cm  Weight (kg): 66 (25 @ 23:57)    Daily Weight in k ()  % Weight Change    MEDICATIONS  (STANDING):  aMIOdarone    Tablet 100 milliGRAM(s) Oral <User Schedule>  amLODIPine   Tablet 2.5 milliGRAM(s) Oral daily  atorvastatin 10 milliGRAM(s) Oral at bedtime  chlorhexidine 2% Cloths 1 Application(s) Topical <User Schedule>  dextrose 5% + sodium chloride 0.45%. 1000 milliLiter(s) (75 mL/Hr) IV Continuous <Continuous>  finasteride 5 milliGRAM(s) Oral daily  furosemide    Tablet 20 milliGRAM(s) Oral two times a day  meropenem  IVPB 2000 milliGRAM(s) IV Intermittent every 8 hours  pantoprazole    Tablet 40 milliGRAM(s) Oral before breakfast  QUEtiapine 12.5 milliGRAM(s) Oral at bedtime  sertraline 50 milliGRAM(s) Oral daily  tamsulosin 0.4 milliGRAM(s) Oral at bedtime    MEDICATIONS  (PRN):  acetaminophen     Tablet .. 650 milliGRAM(s) Oral every 6 hours PRN Temp greater or equal to 38C (100.4F), Mild Pain (1 - 3)  acetaminophen  Suppository .. 650 milliGRAM(s) Rectal every 6 hours PRN Temp greater or equal to 38C (100.4F), Mild Pain (1 - 3)  ondansetron Injectable 4 milliGRAM(s) IV Push every 6 hours PRN Nausea  senna 2 Tablet(s) Oral at bedtime PRN Constipation    Pertinent Labs:  @ 05:58: Na 154[H], BUN 31[H], Cr 0.7, [H], K+ 4.5, Phos --, Mg --, Alk Phos 121[H], ALT/SGPT 36, AST/SGOT 38, HbA1c --    Finger Sticks:    Physical Findings:  - Appearance: pt resting  - GI function: +BS   - Tubes: n/a   - Oral/Mouth cavity:  - Skin: intact   - Edema: none noted      Nutrition Requirements:  Weight Used: 66 kgs     Estimated Energy Needs   9915-1501 kcals (25-30 kcals/kg/BW)  66-79g protein (1-1.2g/kg/BW)  1;1 kcal for estimated fluid needs      Nutrient Intake: pt tolerating po diet well consumes 50% of meals and 100% of ensure with 1;1 feed    [] Previous Nutrition Diagnosis:            [] Ongoing          [] Resolved    inadequate pro-energy intake resolving     Goal/Expected Outcome: pt to continue to tolerate po diet and meet at least 80% of estimated needs within 5-7 days      Indicator/Monitoring: tolerance to po diet, labs/meds, NFPF    Recommendation:   maintain on present diet and supplements  moderate risk

## 2025-03-25 NOTE — PROGRESS NOTE ADULT - ASSESSMENT
1. SDH on DOAC   agree with holding off doAC    holding off drainage with close monitor for expansion of hematoma  ? hygroma  vs SDh    2.  late onset alzheimer's dementia with hallucination    3 paroxysmal Afib on xarelto ..on hold   >>> lovenox for now     4  Aortic Stenosis s/p AV replacement; CAD s/p CABG, CHF,  HTN and hyperlipidema presenting with AMS    5. sepsis with bacteremia gNR   Zosyn 3.375 gram q 8 hours. (S/D of abx 3/19)   long term antibx    will follow

## 2025-03-25 NOTE — PROGRESS NOTE ADULT - ASSESSMENT
This is a 84 year old male with PMH of late onset alzheimer's dementia with hallucination, paroxysmal Afib on xarelto , Aortic Stenosis s/p AV replacement; CAD s/p CABG, CHF,  HTN and hyperlipidema presenting with AMS    IMPRESSION  #Metabolic encephalopathy   #Subdural hematomas/Hygromas- Currently can not rule out the possibility of abcess or seeding of infection there.   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  ESR 75, .9 3/25/2025     RECOMMENDATIONS  -Follow up with Neurology and Neurosurgery.   -Follow up with Cardiology. It will be ideal if he received a ALBERTO to further define the vegetation.  -If ALBERTO not amenable, obtain cardiac CT for further aligning the vegetation.   -If no further intervention is being planned for neurosurgery stand point then proceed with PICC line.  -IV plan for IV meropenem 2 gram q 8 hours from 3/23/2025.   -Weekly CBC, CMP, ESR/CRP  - ID follow-up with Dr. Jai Woods for Telehealth. We will call the patient between 10:30-1:30      [address] [facility]       [phone]       Fax [phone]  -Will need repeat imaging of the head before stopping the abx. Repeat TTE in the outpatient setting.   -Off loading to prevent pressure sores and preventive measures to avoid aspiration. GOC    If any questions, please send a message or call on [employer] Teams  Please continue to update ID with any pertinent new laboratory or radiographic findings.    Mohan Benz M.D  Infectious Diseases Attending/   [employer] at [facility]   This is a 84 year old male with PMH of late onset alzheimer's dementia with hallucination, paroxysmal Afib on xarelto , Aortic Stenosis s/p AV replacement; CAD s/p CABG, CHF,  HTN and hyperlipidema presenting with AMS    IMPRESSION  #Metabolic encephalopathy   #Subdural hematomas/Hygromas- Currently can not rule out the possibility of abcess or seeding of infection there.   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  ESR 75, .9 3/25/2025     RECOMMENDATIONS  -Follow up with repeat blood cultures from 3/24/2025.   -Follow up with Neurology and Neurosurgery.   -Follow up with Cardiology. It will be ideal if he received a ALBERTO to further define the vegetation.  -If ALBERTO not amenable, obtain cardiac CT for further aligning the vegetation.   -If no further intervention is being planned for neurosurgery stand point then proceed with PICC line.  -IV plan for IV meropenem 2 gram q 8 hours from 3/23/2025.   -Weekly CBC, CMP, ESR/CRP  - ID follow-up with Dr. Jai Woods for Telehealth. We will call the patient between 10:30-1:30      [address] [facility]       [phone]       Fax [phone]  -Will need repeat imaging of the head before stopping the abx. Repeat TTE in the outpatient setting.   -Off loading to prevent pressure sores and preventive measures to avoid aspiration. GOC    If any questions, please send a message or call on [employer] Teams  Please continue to update ID with any pertinent new laboratory or radiographic findings.    Mohan Benz M.D  Infectious Diseases Attending/   [employer] at hospitals/[facility]

## 2025-03-25 NOTE — PROGRESS NOTE ADULT - SUBJECTIVE AND OBJECTIVE BOX
TONJA PECK 85y Male  MRN#: [id number]   Hospital Day: 6d    SUBJECTIVE  Patient is a 85y old Male who presents with a chief complaint of AMS (25 Mar 2025 09:19)  Currently admitted to medicine with the primary diagnosis of Acute UTI      INTERVAL HPI AND OVERNIGHT EVENTS:  Patient was examined and seen at bedside. This morning he is resting comfortably in bed.    REVIEW OF SYMPTOMS:  unable to assess at this time. patient also poor historian     OBJECTIVE  PAST MEDICAL & SURGICAL HISTORY  Afib    CHF (congestive heart failure)    Hypertension    S/P CABG (coronary artery bypass graft)      ALLERGIES:  No Known Allergies    MEDICATIONS:  STANDING MEDICATIONS  aMIOdarone    Tablet 100 milliGRAM(s) Oral <User Schedule>  atorvastatin 10 milliGRAM(s) Oral at bedtime  chlorhexidine 2% Cloths 1 Application(s) Topical <User Schedule>  dextrose 5% + sodium chloride 0.45%. 1000 milliLiter(s) IV Continuous <Continuous>  finasteride 5 milliGRAM(s) Oral daily  furosemide    Tablet 20 milliGRAM(s) Oral two times a day  meropenem  IVPB 2000 milliGRAM(s) IV Intermittent every 8 hours  NIFEdipine IR 10 milliGRAM(s) Oral two times a day  pantoprazole    Tablet 40 milliGRAM(s) Oral before breakfast  QUEtiapine 12.5 milliGRAM(s) Oral at bedtime  sertraline 50 milliGRAM(s) Oral daily  tamsulosin 0.4 milliGRAM(s) Oral at bedtime    PRN MEDICATIONS  acetaminophen     Tablet .. 650 milliGRAM(s) Oral every 6 hours PRN  acetaminophen  Suppository .. 650 milliGRAM(s) Rectal every 6 hours PRN  ondansetron Injectable 4 milliGRAM(s) IV Push every 6 hours PRN  senna 2 Tablet(s) Oral at bedtime PRN      VITAL SIGNS: Last 24 Hours  T(C): 36.6 (25 Mar 2025 13:26), Max: 38.8 (24 Mar 2025 22:19)  T(F): 97.9 (25 Mar 2025 13:26), Max: 101.8 (24 Mar 2025 22:19)  HR: 52 (25 Mar 2025 13:26) (52 - 64)  BP: 156/60 (25 Mar 2025 13:26) (156/60 - 184/75)  BP(mean): --  RR: 16 (25 Mar 2025 13:26) (16 - 17)  SpO2: 95% (25 Mar 2025 13:26) (94% - 97%)    LABS:                        9.2    11.67 )-----------( 362      ( 25 Mar 2025 05:58 )             26.6     03-25    154[H]  |  120[H]  |  31[H]  ----------------------------<  109[H]  4.5   |  26  |  0.7    Ca    8.5      25 Mar 2025 05:58    TPro  5.5[L]  /  Alb  2.7[L]  /  TBili  0.3  /  DBili  x   /  AST  38  /  ALT  36  /  AlkPhos  121[H]  03-25      Urinalysis Basic - ( 25 Mar 2025 05:58 )    Color: x / Appearance: x / SG: x / pH: x  Gluc: 109 mg/dL / Ketone: x  / Bili: x / Urobili: x   Blood: x / Protein: x / Nitrite: x   Leuk Esterase: x / RBC: x / WBC x   Sq Epi: x / Non Sq Epi: x / Bacteria: x        Sedimentation Rate, Erythrocyte: 75 mm/Hr *H* (03-25-25 @ 05:58)          RADIOLOGY:      PHYSICAL EXAM:  CONSTITUTIONAL: No acute distress, bitemporal wasting , well-groomed  HEAD: Atraumatic, normocephalic  EYES: PERRLA, conjunctiva and sclera clear  ENT: Supple, no bruits, + JVD; moist mucous membranes  PULMONARY: Clear to auscultation bilaterally; no wheezes, rales, or rhonchi  CARDIOVASCULAR: Regular rate and rhythm; no murmurs, rubs, or gallops  GASTROINTESTINAL: Soft, non-tender, non-distended; bowel sounds present  MUSCULOSKELETAL: 2+ peripheral pulses; no clubbing, no cyanosis, no edema  NEUROLOGY: sleeping but easy to wake. no tremor of fasciculations   SKIN: No rashes or lesions; warm and dry

## 2025-03-25 NOTE — PROGRESS NOTE ADULT - SUBJECTIVE AND OBJECTIVE BOX
CISCO TONJA  85y, Male    LOS  6d    INTERVAL EVENTS/HPI  - T(F): , Max: 101.8 (03-24-25 @ 22:19)  - WBC Count: 11.67 (03-25-25 @ 05:58)  WBC Count: 12.32 (03-24-25 @ 06:45)  - Creatinine: 0.7 (03-25-25 @ 05:58)  Creatinine: 0.8 (03-24-25 @ 06:45)    REVIEW OF SYSTEMS: cannot obtain further history from the patient secondary to altered mental status or sedation    Prior hospital charts reviewed [Yes]  Primary team notes reviewed [Yes]  Other consultant notes reviewed [Yes]    ANTIMICROBIALS:   meropenem  IVPB 2000 every 8 hours      OTHER MEDS: MEDICATIONS  (STANDING):  acetaminophen     Tablet .. 650 every 6 hours PRN  acetaminophen  Suppository .. 650 every 6 hours PRN  aMIOdarone    Tablet 100 <User Schedule>  atorvastatin 10 at bedtime  finasteride 5 daily  ondansetron Injectable 4 every 6 hours PRN  pantoprazole    Tablet 40 before breakfast  QUEtiapine 12.5 at bedtime  senna 2 at bedtime PRN  sertraline 50 daily  tamsulosin 0.4 at bedtime      Vital Signs Last 24 Hrs  T(F): 97.7 (03-25-25 @ 06:20), Max: 103.8 (03-19-25 @ 23:57)    Vital Signs Last 24 Hrs  HR: 64 (03-25-25 @ 05:32) (59 - 64)  BP: 184/75 (03-25-25 @ 05:32) (165/63 - 184/75)  RR: 16 (03-25-25 @ 05:32)  SpO2: 97% (03-25-25 @ 05:32) (94% - 97%)  Wt(kg): --    EXAM:  GENERAL: Somnolent.  HEAD: No head lesions  NECK: Supple  CHEST/LUNG: Shallow breath sounds.   HEART: S1 S2  ABDOMEN: Soft, nontender.  EXTREMITIES: No clubbing  NERVOUS SYSTEM: Somnolent.   MSK: No joint erythema, swelling or pain  SKIN: No rashes or lesions, no superficial thrombophlebitis    Labs:                        9.2    11.67 )-----------( 362      ( 25 Mar 2025 05:58 )             26.6     03-25    154[H]  |  120[H]  |  31[H]  ----------------------------<  109[H]  4.5   |  26  |  0.7    Ca    8.5      25 Mar 2025 05:58    TPro  5.5[L]  /  Alb  2.7[L]  /  TBili  0.3  /  DBili  x   /  AST  38  /  ALT  36  /  AlkPhos  121[H]  03-25      WBC Trend:  WBC Count: 11.67 (03-25-25 @ 05:58)  WBC Count: 12.32 (03-24-25 @ 06:45)  WBC Count: 12.22 (03-23-25 @ 04:30)  WBC Count: 14.89 (03-22-25 @ 04:30)      Creatine Trend:  Creatinine: 0.7 (03-25)  Creatinine: 0.8 (03-24)  Creatinine: 0.8 (03-23)  Creatinine: 1.1 (03-22)      Liver Biochemical Testing Trend:  Alanine Aminotransferase (ALT/SGPT): 36 (03-25)  Alanine Aminotransferase (ALT/SGPT): 21 (03-22)  Alanine Aminotransferase (ALT/SGPT): 24 (03-21)  Alanine Aminotransferase (ALT/SGPT): 31 (03-19)  Alanine Aminotransferase (ALT/SGPT): 12 (06-26)  Aspartate Aminotransferase (AST/SGOT): 38 (03-25-25 @ 05:58)  Aspartate Aminotransferase (AST/SGOT): 22 (03-22-25 @ 04:30)  Aspartate Aminotransferase (AST/SGOT): 23 (03-21-25 @ 07:04)  Aspartate Aminotransferase (AST/SGOT): 28 (03-19-25 @ 18:30)  Aspartate Aminotransferase (AST/SGOT): 22 (06-26-24 @ 07:17)  Bilirubin Total: 0.3 (03-25)  Bilirubin Total: 0.4 (03-22)  Bilirubin Total: 0.6 (03-21)  Bilirubin Total: 0.5 (03-19)  Bilirubin Total: 0.4 (06-26)      Trend LDH      Urinalysis Basic - ( 25 Mar 2025 05:58 )    Color: x / Appearance: x / SG: x / pH: x  Gluc: 109 mg/dL / Ketone: x  / Bili: x / Urobili: x   Blood: x / Protein: x / Nitrite: x   Leuk Esterase: x / RBC: x / WBC x   Sq Epi: x / Non Sq Epi: x / Bacteria: x        MICROBIOLOGY:    Male    Culture - Blood (collected 21 Mar 2025 07:04)  Source: Blood None  Final Report:    Growth in aerobic bottle: Pseudomonas aeruginosa See previous culture    [id number]    Culture - Urine (collected 19 Mar 2025 19:35)  Source: Catheterized None  Final Report:    >100,000 CFU/ml Pseudomonas aeruginosa  Organism: Pseudomonas aeruginosa  Organism: Pseudomonas aeruginosa    Sensitivities:      Method Type: NIALL      -  Amikacin: S <=16      -  Aztreonam: R >16      -  Cefepime: R >16      -  Ceftazidime: R >16      -  Ciprofloxacin: I 1      -  Imipenem: S <=1      -  Levofloxacin: I 2      -  Meropenem: S <=1      -  Piperacillin/Tazobactam: R >64    Urinalysis with Rflx Culture (collected 19 Mar 2025 19:35)    Culture - Blood (collected 19 Mar 2025 18:30)  Source: Blood Blood-Peripheral  Final Report:    Growth in aerobic bottle: Pseudomonas aeruginosa    Direct identification is available within approximately 3-5    hours either by Blood Panel Multiplexed PCR or Direct    MALDI-TOF. Details: [email or web address]  Organism: Blood Culture PCR  Pseudomonas aeruginosa  Organism: Pseudomonas aeruginosa    Sensitivities:      Method Type: NIALL      -  Aztreonam: S <=4      -  Cefepime: S 8      -  Ceftazidime: S 4      -  Ciprofloxacin: I 1      -  Imipenem: S <=1      -  Levofloxacin: I 2      -  Meropenem: S <=1      -  Piperacillin/Tazobactam: S <=8  Organism: Blood Culture PCR    Sensitivities:      Method Type: PCR      -  Pseudomonas aeruginosa: Detec    Culture - Blood (collected 19 Mar 2025 18:30)  Source: Blood Blood-Peripheral  Final Report:    Growth in aerobic bottle: Pseudomonas aeruginosa    See previous culture [id number]  C-Reactive Protein: 129.9 (03-25)  RADIOLOGY & ADDITIONAL TESTS:  I have personally reviewed the relevant images.   CXR      CT    < from: CT Head No Cont (03.21.25 @ 10:06) >    IMPRESSION:  Motion degraded examination.    Essentially unchanged appearance of bilateral mixed density demonstrated  subdural collections likely reflecting subacute to chronic subdural   hematomas versus hygromas.    < end of copied text >  < from: TTE Echo Complete w/ Contrast w/o Doppler (03.24.25 @ 09:35) >  Summary:   1. Normal global left ventricular systolic function.   2. LV Ejection Fraction by Cazares's Method with a biplane EF of 52 %.   3. Severe concentric left ventricular hypertrophy.   4. Abnormal septal motion consistent with left bundle branch block.   5. The left ventricular diastolic function could not be assessedin this   study.   6. Normal right ventricular size and function.   7. Severely enlarged left atrium. LA volume Index is 45.6 ml/m².   8. Normal right atrial size.   9. Bioprosthetic aortic valve with significant stenosis with PV 4.1 m/s,   PG 67 mmHg, 37 mmHg, EOA 1.33 cm2, EOAi 0.74 cm2/m2. Moderate prosthetic   valve regurgitation.  10. Moderate thickening and calcification of the posterior mitral valve   leaflet.  11. Possible small, mobile vegetation on atrial-side of anterior leaflet   of mitral valve.  12. Mild mitral valve regurgitation.  13. Mild pulmonic valve regurgitation.  14. Dilatation of the ascending aorta, measures 3.8 cm.  15. Normal pulmonary artery pressure.  16. There is no evidence of pericardial effusion.    < end of copied text >      WEIGHT  Weight (kg): 66 (03-19-25 @ 23:57)  Creatinine: 0.7 mg/dL (03-25-25 @ 05:58)      All available historical records have been reviewed

## 2025-03-26 LAB
-  TOBRAMYCIN: SIGNIFICANT CHANGE UP
ANION GAP SERPL CALC-SCNC: 8 MMOL/L — SIGNIFICANT CHANGE UP (ref 7–14)
BUN SERPL-MCNC: 28 MG/DL — HIGH (ref 10–20)
CALCIUM SERPL-MCNC: 8.3 MG/DL — LOW (ref 8.4–10.5)
CHLORIDE SERPL-SCNC: 112 MMOL/L — HIGH (ref 98–110)
CO2 SERPL-SCNC: 27 MMOL/L — SIGNIFICANT CHANGE UP (ref 17–32)
CREAT SERPL-MCNC: 0.8 MG/DL — SIGNIFICANT CHANGE UP (ref 0.7–1.5)
EGFR: 87 ML/MIN/1.73M2 — SIGNIFICANT CHANGE UP
EGFR: 87 ML/MIN/1.73M2 — SIGNIFICANT CHANGE UP
GLUCOSE SERPL-MCNC: 104 MG/DL — HIGH (ref 70–99)
HCT VFR BLD CALC: 25.4 % — LOW (ref 42–52)
HGB BLD-MCNC: 8.7 G/DL — LOW (ref 14–18)
MCHC RBC-ENTMCNC: 30.7 PG — SIGNIFICANT CHANGE UP (ref 27–31)
MCHC RBC-ENTMCNC: 34.3 G/DL — SIGNIFICANT CHANGE UP (ref 32–37)
MCV RBC AUTO: 89.8 FL — SIGNIFICANT CHANGE UP (ref 80–94)
METHOD TYPE: SIGNIFICANT CHANGE UP
NRBC BLD AUTO-RTO: 0 /100 WBCS — SIGNIFICANT CHANGE UP (ref 0–0)
ORGANISM # SPEC MICROSCOPIC CNT: ABNORMAL
ORGANISM # SPEC MICROSCOPIC CNT: SIGNIFICANT CHANGE UP
PLATELET # BLD AUTO: 382 K/UL — SIGNIFICANT CHANGE UP (ref 130–400)
PMV BLD: 11.3 FL — HIGH (ref 7.4–10.4)
POTASSIUM SERPL-MCNC: 4.1 MMOL/L — SIGNIFICANT CHANGE UP (ref 3.5–5)
POTASSIUM SERPL-SCNC: 4.1 MMOL/L — SIGNIFICANT CHANGE UP (ref 3.5–5)
RBC # BLD: 2.83 M/UL — LOW (ref 4.7–6.1)
RBC # FLD: 17 % — HIGH (ref 11.5–14.5)
SODIUM SERPL-SCNC: 147 MMOL/L — HIGH (ref 135–146)
WBC # BLD: 12.58 K/UL — HIGH (ref 4.8–10.8)
WBC # FLD AUTO: 12.58 K/UL — HIGH (ref 4.8–10.8)

## 2025-03-26 PROCEDURE — 99233 SBSQ HOSP IP/OBS HIGH 50: CPT

## 2025-03-26 RX ORDER — AMLODIPINE BESYLATE 10 MG/1
10 TABLET ORAL EVERY 24 HOURS
Refills: 0 | Status: DISCONTINUED | OUTPATIENT
Start: 2025-03-26 | End: 2025-03-31

## 2025-03-26 RX ORDER — METOPROLOL SUCCINATE 50 MG/1
25 TABLET, EXTENDED RELEASE ORAL AT BEDTIME
Refills: 0 | Status: DISCONTINUED | OUTPATIENT
Start: 2025-03-26 | End: 2025-03-28

## 2025-03-26 RX ORDER — SODIUM CHLORIDE 9 G/1000ML
1000 INJECTION, SOLUTION INTRAVENOUS
Refills: 0 | Status: DISCONTINUED | OUTPATIENT
Start: 2025-03-26 | End: 2025-03-31

## 2025-03-26 RX ORDER — METOPROLOL SUCCINATE 50 MG/1
25 TABLET, EXTENDED RELEASE ORAL DAILY
Refills: 0 | Status: DISCONTINUED | OUTPATIENT
Start: 2025-03-27 | End: 2025-03-28

## 2025-03-26 RX ADMIN — FINASTERIDE 5 MILLIGRAM(S): 1 TABLET, FILM COATED ORAL at 11:07

## 2025-03-26 RX ADMIN — Medication 40 MILLIGRAM(S): at 05:51

## 2025-03-26 RX ADMIN — MEROPENEM 280 MILLIGRAM(S): 1 INJECTION INTRAVENOUS at 21:39

## 2025-03-26 RX ADMIN — Medication 650 MILLIGRAM(S): at 06:22

## 2025-03-26 RX ADMIN — Medication 1 APPLICATION(S): at 05:52

## 2025-03-26 RX ADMIN — TAMSULOSIN HYDROCHLORIDE 0.4 MILLIGRAM(S): 0.4 CAPSULE ORAL at 21:39

## 2025-03-26 RX ADMIN — SODIUM CHLORIDE 50 MILLILITER(S): 9 INJECTION, SOLUTION INTRAVENOUS at 13:14

## 2025-03-26 RX ADMIN — FUROSEMIDE 20 MILLIGRAM(S): 10 INJECTION INTRAMUSCULAR; INTRAVENOUS at 05:51

## 2025-03-26 RX ADMIN — MEROPENEM 280 MILLIGRAM(S): 1 INJECTION INTRAVENOUS at 05:52

## 2025-03-26 RX ADMIN — Medication 650 MILLIGRAM(S): at 04:59

## 2025-03-26 RX ADMIN — SERTRALINE 50 MILLIGRAM(S): 100 TABLET, FILM COATED ORAL at 11:07

## 2025-03-26 RX ADMIN — ATORVASTATIN CALCIUM 10 MILLIGRAM(S): 80 TABLET, FILM COATED ORAL at 21:40

## 2025-03-26 RX ADMIN — QUETIAPINE FUMARATE 12.5 MILLIGRAM(S): 25 TABLET ORAL at 21:39

## 2025-03-26 RX ADMIN — AMLODIPINE BESYLATE 2.5 MILLIGRAM(S): 10 TABLET ORAL at 05:51

## 2025-03-26 RX ADMIN — METOPROLOL SUCCINATE 25 MILLIGRAM(S): 50 TABLET, EXTENDED RELEASE ORAL at 21:39

## 2025-03-26 RX ADMIN — MEROPENEM 280 MILLIGRAM(S): 1 INJECTION INTRAVENOUS at 14:29

## 2025-03-26 NOTE — PROGRESS NOTE ADULT - ASSESSMENT
1. SDH on DOAC   agree with holding off doAC    holding off drainage with close monitor for expansion of hematoma  ? hygroma  vs SDh    2.  late onset alzheimer's dementia with hallucination    3 paroxysmal Afib on xarelto ..on hold      4  Aortic Stenosis s/p AV replacement; CAD s/p CABG, CHF,  HTN and hyperlipidema presenting with AMS    5. sepsis with bacteremia gNR   Zosyn 3.375 gram q 8 hours. (S/D of abx 3/19)   long term antibx    will follow

## 2025-03-26 NOTE — PROGRESS NOTE ADULT - SUBJECTIVE AND OBJECTIVE BOX
Patient is a 85y old  Male who presents with a chief complaint of AMS (25 Mar 2025 16:26)      MEDICATIONS  (STANDING):  aMIOdarone    Tablet 100 milliGRAM(s) Oral <User Schedule>  amLODIPine   Tablet 2.5 milliGRAM(s) Oral daily  atorvastatin 10 milliGRAM(s) Oral at bedtime  chlorhexidine 2% Cloths 1 Application(s) Topical <User Schedule>  dextrose 5% + sodium chloride 0.45%. 1000 milliLiter(s) (50 mL/Hr) IV Continuous <Continuous>  finasteride 5 milliGRAM(s) Oral daily  furosemide    Tablet 20 milliGRAM(s) Oral two times a day  meropenem  IVPB 2000 milliGRAM(s) IV Intermittent every 8 hours  pantoprazole    Tablet 40 milliGRAM(s) Oral before breakfast  QUEtiapine 12.5 milliGRAM(s) Oral at bedtime  sertraline 50 milliGRAM(s) Oral daily  tamsulosin 0.4 milliGRAM(s) Oral at bedtime    MEDICATIONS  (PRN):  acetaminophen     Tablet .. 650 milliGRAM(s) Oral every 6 hours PRN Temp greater or equal to 38C (100.4F), Mild Pain (1 - 3)  acetaminophen  Suppository .. 650 milliGRAM(s) Rectal every 6 hours PRN Temp greater or equal to 38C (100.4F), Mild Pain (1 - 3)  ondansetron Injectable 4 milliGRAM(s) IV Push every 6 hours PRN Nausea  senna 2 Tablet(s) Oral at bedtime PRN Constipation      CAPILLARY BLOOD GLUCOSE        I&O's Summary    25 Mar 2025 07:01  -  26 Mar 2025 07:00  --------------------------------------------------------  IN: 0 mL / OUT: 2675 mL / NET: -2675 mL    26 Mar 2025 07:01  -  26 Mar 2025 12:55  --------------------------------------------------------  IN: 0 mL / OUT: 800 mL / NET: -800 mL        PHYSICAL EXAM:  Vital Signs Last 24 Hrs  T(C): 37.7 (26 Mar 2025 07:38), Max: 38.7 (26 Mar 2025 04:57)  T(F): 99.8 (26 Mar 2025 07:38), Max: 101.7 (26 Mar 2025 04:57)  HR: 78 (26 Mar 2025 04:57) (52 - 78)  BP: 169/64 (26 Mar 2025 04:57) (141/81 - 169/64)  BP(mean): --  RR: 17 (26 Mar 2025 04:57) (16 - 17)  SpO2: 97% (26 Mar 2025 04:57) (95% - 98%)    Parameters below as of 26 Mar 2025 04:57  Patient On (Oxygen Delivery Method): room air        GENERAL: No acute distress,  intermittent lethargy  HEAD:  Atraumatic, Normocephalic  EYES: closes eyes  NECK: Supple, no JVD  CHEST/LUNG: CTAB; No wheezes, rales, or rhonchi  HEART: Regular rate and rhythm; No murmurs  ABDOMEN: Soft, non-tender, non-distended;  EXTREMITIES: No clubbing, cyanosis, or edema  NEUROLOGY: A&O x 0, no focal deficits,  SKIN: No rashes or lesions    LABS:                        8.7    12.58 )-----------( 382      ( 26 Mar 2025 05:59 )             25.4     03-26    147[H]  |  112[H]  |  28[H]  ----------------------------<  104[H]  4.1   |  27  |  0.8    Ca    8.3[L]      26 Mar 2025 05:59    TPro  5.5[L]  /  Alb  2.7[L]  /  TBili  0.3  /  DBili  x   /  AST  38  /  ALT  36  /  AlkPhos  121[H]  03-2        Urinalysis Basic - ( 26 Mar 2025 05:59 )    Color: x / Appearance: x / SG: x / pH: x  Gluc: 104 mg/dL / Ketone: x  / Bili: x / Urobili: x   Blood: x / Protein: x / Nitrite: x   Leuk Esterase: x / RBC: x / WBC x   Sq Epi: x / Non Sq Epi: x / Bacteria: x        Culture - Blood (collected 24 Mar 2025 06:45)  Source: Blood None  Preliminary Report (25 Mar 2025 17:02):    No growth at 24 hours

## 2025-03-26 NOTE — PROGRESS NOTE ADULT - ASSESSMENT
Mr Kell is a 85 YOM w a PMH of alzheimer's dementia with hallucination (followed by Dr. Juan Neurologist), HFpEF, HTN, paroxysmal Afib on xarelto , Aortic Stenosis s/p AV replacement; CAD s/p CABG and hyperlipidema presenting with worsening confusion            Acute Cystis: ucx: pseudomonas aeruginosa   Bacteriemia: pseudomonas aeruginosa   Possible Endocarditis    SIRS present on admission >>progressed to sepsis   Likely Infectious from Acute cystitis and possibly some contribution from cerebral Hematoma/hygroma  Head CT: subacute to chronic subdural hematomas. No significant mass effect or midline shift.  Neurosurgery Recommended Transfer to [place] but Family declined understanding risk of brain herniation and death   Patient on Xarelto for AFIB: continue to hold for now AC for 4 weeks per Neurosurgery   Repeat Head CT: The density of the bilateral cerebral convexity and falcine subdural collections appears uniformly increased when compared to   the previous head CT probably reflecting IV contrast. Recommend short-term follow-up imaging.  2D-ECHO: EF 52%, Severe LVH, Bioprosthetic aortic valve with significant stenosis, Moderate prosthetic valve regurgitation.  Possible small, mobile vegetation on atrial-side of anterior leaflet   PATIENT IS NOT A CANDIDATE FOR ALBERTO PER CARDIO, GIVEN BACTEREMIA  PLAN FOR PICC PLACEMENT THURSDAY 3/27      Metabolic Encephalopathy>>still with intermittent lethargy  Advanced  Dementia   Most Likely infectious with some possibility of Subdural cerebral hematoma/hygroma  Neurosurgery consult appreciated>>Family refused transfer to [place]   **Recs to keep holding anticoagulation for at least 4 weeks with repeat CT head .O/P follow-up with Dr Supriya Amor   Neuro Endocrine: - Potential procedural planning for MMA embolization  will be delayed until the patient's infectious workup has been completed.    ID consult appreciated >>Continue IV antibiotics  Hold  Seroquel, continue daily re-orientation       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  AORTIC Stenosis s/p AV replacement   Paroxysmal AFIB + HTN + HLD   -BP now elevated, started on Norvasc and Lasix  -Continue Amiodarone , Statin, and DASH    Chronic BPH with acute urinary Retention   S/P Saenz catheter's insertion 3/25  continue Finasteride  and Flomax    DVT PPX: Xarelto on hold for Hematoma/hygroma for 4 weeks, SCD's for now   GI PPX: PPI   DNI/DNR   Dispo: From Home   Pending Clinical Improvement, acute      Mr Kell is a 85 YOM w a PMH of alzheimer's dementia with hallucination (followed by Dr. Juan Neurologist), HFpEF, HTN, paroxysmal Afib on xarelto , Aortic Stenosis s/p AV replacement; CAD s/p CABG and hyperlipidema presenting with worsening confusion            Acute Cystis: ucx: pseudomonas aeruginosa   Bacteriemia: pseudomonas aeruginosa   Possible Endocarditis    SIRS present on admission >>progressed to sepsis   Likely Infectious from Acute cystitis and possibly some contribution from cerebral Hematoma/hygroma  Head CT: subacute to chronic subdural hematomas. No significant mass effect or midline shift.  Neurosurgery Recommended Transfer to [place] but Family declined understanding risk of brain herniation and death   Patient on Xarelto for AFIB: continue to hold for now AC for 4 weeks per Neurosurgery   Repeat Head CT: The density of the bilateral cerebral convexity and falcine subdural collections appears uniformly increased when compared to   the previous head CT probably reflecting IV contrast. Recommend short-term follow-up imaging.  2D-ECHO: EF 52%, Severe LVH, Bioprosthetic aortic valve with significant stenosis, Moderate prosthetic valve regurgitation.  Possible small, mobile vegetation on atrial-side of anterior leaflet   PATIENT IS NOT A CANDIDATE FOR ALBERTO PER CARDIO, GIVEN BACTEREMIA  Cardiac CT recommended by ID, Radiologist aware   PLAN FOR PICC PLACEMENT THURSDAY 3/27      Metabolic Encephalopathy>>still with intermittent lethargy  Advanced  Dementia   Most Likely infectious with some possibility of Subdural cerebral hematoma/hygroma  Neurosurgery consult appreciated>>Family refused transfer to [place]   **Recs to keep holding anticoagulation for at least 4 weeks with repeat CT head .O/P follow-up with Dr Supriya Amor   Neuro Endocrine: - Potential procedural planning for MMA embolization  will be delayed until the patient's infectious workup has been completed.    ID consult appreciated >>Continue IV antibiotics  Hold  Seroquel, continue daily re-orientation       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  AORTIC Stenosis s/p AV replacement   Paroxysmal AFIB + HTN + HLD   -BP now elevated, started on Norvasc and Lasix  -Continue Amiodarone , Statin, and DASH    Chronic BPH with acute urinary Retention   S/P Saenz catheter's insertion 3/25  continue Finasteride  and Flomax    DVT PPX: Xarelto on hold for Hematoma/hygroma for 4 weeks, SCD's for now   GI PPX: PPI   DNI/DNR   Dispo: From Home   Pending Clinical Improvement, acute      Mr Kell is a 85 YOM w a PMH of alzheimer's dementia with hallucination (followed by Dr. Juan Neurologist), HFpEF, HTN, paroxysmal Afib on xarelto , Aortic Stenosis s/p AV replacement; CAD s/p CABG and hyperlipidemia presenting with worsening confusion        Acute Cystis: ucx: pseudomonas aeruginosa   Bacteriemia: pseudomonas aeruginosa   Possible Endocarditis    SIRS present on admission >>progressed to sepsis   Likely Infectious from Acute cystitis and possibly some contribution from cerebral Hematoma/hygroma  Head CT: subacute to chronic subdural hematomas. No significant mass effect or midline shift.  Neurosurgery Recommended Transfer to [place] but Family declined understanding risk of brain herniation and death   Patient on Xarelto for AFIB: continue to hold for now AC for 4 weeks per Neurosurgery   Repeat Head CT: The density of the bilateral cerebral convexity and falcine subdural collections appears uniformly increased when compared to   the previous head CT probably reflecting IV contrast. Recommend short-term follow-up imaging.  2D-ECHO: EF 52%, Severe LVH, Bioprosthetic aortic valve with significant stenosis, Moderate prosthetic valve regurgitation.  Possible small, mobile vegetation on atrial-side of anterior leaflet   PATIENT IS NOT A CANDIDATE FOR ALBERTO PER CARDIO, GIVEN BACTEREMIA  Cardiac CT recommended by ID, Radiologist aware   PLAN FOR PICC PLACEMENT THURSDAY 3/27      Metabolic Encephalopathy>>still with intermittent lethargy  Advanced  Dementia   Most Likely infectious with some possibility of Subdural cerebral hematoma/hygroma  Neurosurgery consult appreciated>>Family refused transfer to [place]   **Recs to keep holding anticoagulation for at least 4 weeks with repeat CT head .O/P follow-up with Dr Supriya Amor   Neuro Endocrine: - Potential procedural planning for MMA embolization  will be delayed until the patient's infectious workup has been completed.    ID consult appreciated >>Continue IV antibiotics  Hold  Seroquel, continue daily re-orientation    Hypovolemic Hypernatremia  Continue IV hydration   Patient intermittently alert per feeding        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  AORTIC Stenosis s/p AV replacement   Paroxysmal AFIB + HTN + HLD   -BP now elevated, started on Norvasc and Lasix  -Continue Amiodarone , Statin, and DASH    Chronic BPH with acute urinary Retention   S/P Saenz catheter's insertion 3/25  continue Finasteride  and Flomax    DVT PPX: Xarelto on hold for Hematoma/hygroma for 4 weeks, SCD's for now   GI PPX: PPI   DNI/DNR   Dispo: From Home   Pending Clinical Improvement, acute      Mr Kell is a 85 YOM w a PMH of alzheimer's dementia with hallucination (followed by Dr. Juan Neurologist), HFpEF, HTN, paroxysmal Afib on xarelto , Aortic Stenosis s/p AV replacement; CAD s/p CABG and hyperlipidemia presenting with worsening confusion        Acute Cystis: ucx: pseudomonas aeruginosa   Bacteriemia: pseudomonas aeruginosa   Possible Endocarditis    SIRS present on admission >>progressed to sepsis   Likely Infectious from Acute cystitis and possibly some contribution from cerebral Hematoma/hygroma  Head CT: subacute to chronic subdural hematomas. No significant mass effect or midline shift.  Neurosurgery Recommended Transfer to [place] but Family declined understanding risk of brain herniation and death   Patient on Xarelto for AFIB: continue to hold for now AC for 4 weeks per Neurosurgery   Repeat Head CT: The density of the bilateral cerebral convexity and falcine subdural collections appears uniformly increased when compared to   the previous head CT probably reflecting IV contrast. Recommend short-term follow-up imaging.  2D-ECHO: EF 52%, Severe LVH, Bioprosthetic aortic valve with significant stenosis, Moderate prosthetic valve regurgitation.  Possible small, mobile vegetation on atrial-side of anterior leaflet   PATIENT IS NOT A CANDIDATE FOR ALBERTO PER CARDIO, GIVEN BACTEREMIA  Cardiac CT recommended by ID, Radiologist aware   PLAN FOR PICC PLACEMENT THURSDAY 3/27      Metabolic Encephalopathy>>still with intermittent lethargy  Advanced  Dementia   Most Likely infectious with some possibility of Subdural cerebral hematoma/hygroma  Neurosurgery consult appreciated>>Family refused transfer to [place]   **Recs to keep holding anticoagulation for at least 4 weeks with repeat CT head .O/P follow-up with Dr Supriya Amor   Neuro Endocrine: - Potential procedural planning for MMA embolization  will be delayed until the patient's infectious workup has been completed.    ID consult appreciated >>Continue IV antibiotics  Hold  Seroquel, continue daily re-orientation    Hypovolemic Hypernatremia  Continue IV hydration   Patient intermittently alert per feeding        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  AORTIC Stenosis s/p AV replacement   Paroxysmal AFIB + HTN + HLD   -BP now elevated, started on Norvasc and IV hydralazine for SBP greater than 160  -Continue Amiodarone , Statin, and DASH    Chronic BPH with acute urinary Retention   S/P Saenz catheter's insertion 3/25  continue Finasteride  and Flomax    DVT PPX: Xarelto on hold for Hematoma/hygroma for 4 weeks, SCD's for now   GI PPX: PPI   DNI/DNR   Dispo: From Home   Pending Clinical Improvement, acute

## 2025-03-26 NOTE — PROGRESS NOTE ADULT - SUBJECTIVE AND OBJECTIVE BOX
HPI:  85 y/o man with PMH of late onset alzheimer's dementia with hallucination (followed by Dr. Juan Neurologist), paroxysmal Afib on xarelto ,   Aortic Stenosis s/p AV replacement; CAD s/p CABG, CHF,  HTN and hyperlipidema presenting with AMS that has been going on for the past 3 days.  fever noticed in the ed.  Patient is lethargic.  unable to obtain ros/hx  as per wife, patient might has fell several months ago  In course of hospitalization, pt found to have SDH while on DOAC.  Currently being on hold.   Patient declined transfer to [place] for neurosurgery evaluation with full understanding of risks, benefits, and alteratives  discussed case with neuro surgery who recommended to hold off the anticoagulation, and head CT scan in 6 hrs      (19 Mar 2025 23:59)        REVIEW OF SYSTEMS:  CONSTITUTIONAL: No fever or chills  HEENT: No sore throat  RESPIRATORY: No cough, no shortness of breath  CARDIOVASCULAR: No chest pain or palpitations  GASTROINTESTINAL: No abdominal or epigastric pain  GENITOURINARY: No dysuria  NEUROLOGICAL: No headache/dizziness  MSK: No joint pain, erythema, or swelling; no back pain  SKIN: No itching, rashes  All other ROS negative except noted above    PAST MEDICAL & SURGICAL HISTORY:  Afib      CHF (congestive heart failure)      Hypertension      S/P CABG (coronary artery bypass graft)    SOCIAL HISTORY: cannot obtain further history from the patient secondary to altered mental status or sedation      FAMILY HISTORY: No pertinent PMH for first degree relatives.     ANTIMICROBIALS:      ANTIMICROBIALS (past 90 days):  MEDICATIONS  (STANDING):    cefepime   IVPB   100 mL/Hr IV Intermittent (03-19-25 @ 19:00)    cefepime   IVPB   100 mL/Hr IV Intermittent (03-20-25 @ 06:01)    vancomycin  IVPB   250 mL/Hr IV Intermittent (03-20-25 @ 00:43)        OTHER MEDS:   MEDICATIONS  (STANDING):  acetaminophen     Tablet .. 650 every 6 hours PRN  acetaminophen   IVPB .. 1000 once  aMIOdarone    Tablet 100 <User Schedule>  atorvastatin 10 at bedtime  finasteride 5 daily  ondansetron Injectable 4 every 6 hours PRN  pantoprazole    Tablet 40 before breakfast  QUEtiapine 12.5 at bedtime  senna 2 at bedtime PRN  sertraline 50 daily  tamsulosin 0.4 at bedtime      VITALS:  Vital Signs Last 24 Hrs  T(F): 98.7 (03-20-25 @ 14:25), Max: 103.8 (03-19-25 @ 23:57)    Vital Signs Last 24 Hrs  HR: 63 (03-20-25 @ 14:25) (63 - 80)  BP: 138/59 (03-20-25 @ 14:25) (127/67 - 170/72)  RR: 20 (03-20-25 @ 14:25)  SpO2: 96% (03-20-25 @ 14:25) (94% - 97%)  Wt(kg): --    EXAM:  GENERAL: Somnolent. Grimacing with sternal rub.   HEAD: No head lesions  NECK: Supple  CHEST/LUNG: Shallow breath sounds.   HEART: S1 S2  ABDOMEN: Soft, nontender.  EXTREMITIES: No clubbing  NERVOUS SYSTEM: Somnolent.   MSK: No joint erythema, swelling or pain  SKIN: No rashes or lesions, no superficial thrombophlebitis    Labs:                        10.8   12.94 )-----------( 212      ( 19 Mar 2025 18:30 )             31.5   >> hb 8     03-19    143  |  106  |  29[H]  ----------------------------<  117[H]  4.4   |  27  |  1.1    Ca    8.3[L]      19 Mar 2025 18:30    TPro  6.0  /  Alb  3.2[L]  /  TBili  0.5  /  DBili  x   /  AST  28  /  ALT  31  /  AlkPhos  121[H]  03-19      WBC Trend:  WBC Count: 12.94 (03-19-25 @ 18:30)      Auto Neutrophil #: 3.96 K/uL (06-30-24 @ 07:21)  Auto Neutrophil #: 6.58 K/uL (06-28-24 @ 07:09)  Auto Neutrophil #: 6.55 K/uL (06-27-24 @ 06:53)  Auto Neutrophil #: 3.06 K/uL (06-25-24 @ 08:12)  Auto Neutrophil #: 3.41 K/uL (06-24-24 @ 18:40)            Trend LDH                INTERPRETATION:  CLINICAL INFORMATION: Fever, abdominal pain    COMPARISON: CT ABDOMEN/PELVIS 3/19/2025.    CONTRAST/COMPLICATIONS:  IV Contrast: Omnipaque 350  90 cc administered   10 cc discarded  Oral Contrast: NONE    PROCEDURE:  CT of the Abdomen and Pelvis was performed.  Sagittal and coronal reformats were performed.    FINDINGS:  LOWER CHEST: Bibasilar atelectasis. Poststernotomy. Cardiomegaly    LIVER: Within normal limits.  BILE DUCTS: Normal caliber.  GALLBLADDER: Cholelithiasis.  SPLEEN: Within normal limits.  PANCREAS: Within normal limits.  ADRENALS: Within normal limits.  KIDNEYS/URETERS: No renal stones or hydronephrosis.    BLADDER: Mild urinary bladder wall thickening  REPRODUCTIVE ORGANS: Unremarkable at CT.    BOWEL: Stool impaction the rectum measuring up to 9 cm. Mild degree of   perirectal fat stranding. Additional desiccated stool within the colon.  PERITONEUM/RETROPERITONEUM: Within normal limits.  VESSELS: Atherosclerotic changes with extensive visceral vascular   calcifications. Circumaortic left renal vein  LYMPH NODES: No lymphadenopathy.  ABDOMINAL WALL: Small fat-containing umbilical hernia  BONES: Osteopenia. Subacute/chronic left L2 transverse process deformity    IMPRESSION:  Stool impaction the rectum measuring up to 9 cm with mild to moderate   degree of perirectal fat stranding    --- End of Report ---            SCARLET SERVIN MD; Attending Radiologist  This document has been electronically signed. Mar 19 2025  8:22PM (03-19-25 @ 20:01)    < from: CT Head No Cont (03.20.25 @ 00:28) >  IMPRESSION:    Increased attenuation of the dural reflections and intracranial vessels   likely reflecting residual contrast from recent intravenous contrast   administration.    Redemonstration of bilateral hemispheric subdural collections likely   reflecting subacute to chronic subdural hematomas, grossly stable since   prior. No mass effect or midline shift.    ATTENDING ADDENDUM: The density of the bilateral cerebral convexity and   falcine subdural collections appears uniformly increased when compared to   the previous head CT probably reflecting IV contrast. Recommend   short-term follow-up imaging.    --- End of Report ---    < end of copied text >  < from: CT Head No Cont (03.19.25 @ 19:53) >  Motion limited examination.    Predominantly low density bilateral subdural collections are noted likely   reflecting subacute to chronic subdural hematomas. No significant mass   effect or midline shift.    < end of copied text >  < from: CT Chest w/ IV Cont (06.24.24 @ 20:14) >  IMPRESSION:    Cholelithiasis.    No evidence of thoracic, abdominal or pelvic visceral injury, laceration,   or hematoma.    No evidence of acute fracture.    --- End of Report ---    < end of copied text >

## 2025-03-27 LAB
ACANTHOCYTES BLD QL SMEAR: SLIGHT — SIGNIFICANT CHANGE UP
ALBUMIN SERPL ELPH-MCNC: 2.9 G/DL — LOW (ref 3.5–5.2)
ALP SERPL-CCNC: 111 U/L — SIGNIFICANT CHANGE UP (ref 30–115)
ALT FLD-CCNC: 29 U/L — SIGNIFICANT CHANGE UP (ref 0–41)
ANION GAP SERPL CALC-SCNC: 8 MMOL/L — SIGNIFICANT CHANGE UP (ref 7–14)
AST SERPL-CCNC: 29 U/L — SIGNIFICANT CHANGE UP (ref 0–41)
BASOPHILS # BLD AUTO: 0.2 K/UL — SIGNIFICANT CHANGE UP (ref 0–0.2)
BASOPHILS NFR BLD AUTO: 2 % — HIGH (ref 0–1)
BILIRUB SERPL-MCNC: 0.5 MG/DL — SIGNIFICANT CHANGE UP (ref 0.2–1.2)
BUN SERPL-MCNC: 24 MG/DL — HIGH (ref 10–20)
BURR CELLS BLD QL SMEAR: PRESENT — SIGNIFICANT CHANGE UP
BURR CELLS BLD QL SMEAR: SLIGHT — SIGNIFICANT CHANGE UP
CALCIUM SERPL-MCNC: 8.4 MG/DL — SIGNIFICANT CHANGE UP (ref 8.4–10.5)
CHLORIDE SERPL-SCNC: 110 MMOL/L — SIGNIFICANT CHANGE UP (ref 98–110)
CO2 SERPL-SCNC: 26 MMOL/L — SIGNIFICANT CHANGE UP (ref 17–32)
CREAT SERPL-MCNC: 0.7 MG/DL — SIGNIFICANT CHANGE UP (ref 0.7–1.5)
EGFR: 90 ML/MIN/1.73M2 — SIGNIFICANT CHANGE UP
EGFR: 90 ML/MIN/1.73M2 — SIGNIFICANT CHANGE UP
EOSINOPHIL # BLD AUTO: 0.3 K/UL — SIGNIFICANT CHANGE UP (ref 0–0.7)
EOSINOPHIL NFR BLD AUTO: 3 % — SIGNIFICANT CHANGE UP (ref 0–8)
GLUCOSE SERPL-MCNC: 103 MG/DL — HIGH (ref 70–99)
HCT VFR BLD CALC: 27.6 % — LOW (ref 42–52)
HGB BLD-MCNC: 9.3 G/DL — LOW (ref 14–18)
LG PLATELETS BLD QL AUTO: SLIGHT — SIGNIFICANT CHANGE UP
LYMPHOCYTES # BLD AUTO: 3.32 K/UL — SIGNIFICANT CHANGE UP (ref 1.2–3.4)
LYMPHOCYTES # BLD AUTO: 33 % — SIGNIFICANT CHANGE UP (ref 20.5–51.1)
MCHC RBC-ENTMCNC: 30 PG — SIGNIFICANT CHANGE UP (ref 27–31)
MCHC RBC-ENTMCNC: 33.7 G/DL — SIGNIFICANT CHANGE UP (ref 32–37)
MCV RBC AUTO: 89 FL — SIGNIFICANT CHANGE UP (ref 80–94)
METAMYELOCYTES # FLD: 1 % — HIGH (ref 0–0)
METAMYELOCYTES NFR BLD: 1 % — HIGH (ref 0–0)
MONOCYTES # BLD AUTO: 0.81 K/UL — HIGH (ref 0.1–0.6)
MONOCYTES NFR BLD AUTO: 8 % — SIGNIFICANT CHANGE UP (ref 1.7–9.3)
MYELOCYTES NFR BLD: 1 % — HIGH (ref 0–0)
NEUTROPHILS # BLD AUTO: 5.14 K/UL — SIGNIFICANT CHANGE UP (ref 1.4–6.5)
NEUTROPHILS NFR BLD AUTO: 50 % — SIGNIFICANT CHANGE UP (ref 42.2–75.2)
NEUTS BAND # BLD: 1 % — SIGNIFICANT CHANGE UP (ref 0–6)
NEUTS BAND NFR BLD: 1 % — SIGNIFICANT CHANGE UP (ref 0–6)
NRBC # BLD: 0 /100 WBCS — SIGNIFICANT CHANGE UP (ref 0–0)
NRBC BLD AUTO-RTO: SIGNIFICANT CHANGE UP /100 WBCS (ref 0–0)
NRBC BLD-RTO: 0 /100 WBCS — SIGNIFICANT CHANGE UP (ref 0–0)
PLAT MORPH BLD: NORMAL — SIGNIFICANT CHANGE UP
PLATELET # BLD AUTO: 387 K/UL — SIGNIFICANT CHANGE UP (ref 130–400)
PLATELET CLUMP BLD QL SMEAR: SLIGHT
PMV BLD: 10.8 FL — HIGH (ref 7.4–10.4)
POLYCHROMASIA BLD QL SMEAR: SLIGHT — SIGNIFICANT CHANGE UP
POTASSIUM SERPL-MCNC: 4.4 MMOL/L — SIGNIFICANT CHANGE UP (ref 3.5–5)
POTASSIUM SERPL-SCNC: 4.4 MMOL/L — SIGNIFICANT CHANGE UP (ref 3.5–5)
PROT SERPL-MCNC: 5.9 G/DL — LOW (ref 6–8)
RBC # BLD: 3.1 M/UL — LOW (ref 4.7–6.1)
RBC # FLD: 16.8 % — HIGH (ref 11.5–14.5)
RBC BLD AUTO: ABNORMAL
SCHISTOCYTES BLD QL AUTO: SLIGHT — SIGNIFICANT CHANGE UP
SODIUM SERPL-SCNC: 144 MMOL/L — SIGNIFICANT CHANGE UP (ref 135–146)
STOMATOCYTES BLD QL SMEAR: SLIGHT — SIGNIFICANT CHANGE UP
TARGETS BLD QL SMEAR: SIGNIFICANT CHANGE UP
VARIANT LYMPHS # BLD: 1 % — SIGNIFICANT CHANGE UP (ref 0–5)
VARIANT LYMPHS NFR BLD MANUAL: 1 % — SIGNIFICANT CHANGE UP (ref 0–5)
WBC # BLD: 10.07 K/UL — SIGNIFICANT CHANGE UP (ref 4.8–10.8)
WBC # FLD AUTO: 10.07 K/UL — SIGNIFICANT CHANGE UP (ref 4.8–10.8)

## 2025-03-27 PROCEDURE — 99233 SBSQ HOSP IP/OBS HIGH 50: CPT

## 2025-03-27 PROCEDURE — 36573 INSJ PICC RS&I 5 YR+: CPT

## 2025-03-27 PROCEDURE — 75572 CT HRT W/3D IMAGE: CPT | Mod: 26

## 2025-03-27 RX ORDER — KETOROLAC TROMETHAMINE 30 MG/ML
15 INJECTION, SOLUTION INTRAMUSCULAR; INTRAVENOUS ONCE
Refills: 0 | Status: DISCONTINUED | OUTPATIENT
Start: 2025-03-27 | End: 2025-03-27

## 2025-03-27 RX ADMIN — Medication 650 MILLIGRAM(S): at 20:38

## 2025-03-27 RX ADMIN — KETOROLAC TROMETHAMINE 15 MILLIGRAM(S): 30 INJECTION, SOLUTION INTRAMUSCULAR; INTRAVENOUS at 23:50

## 2025-03-27 RX ADMIN — ATORVASTATIN CALCIUM 10 MILLIGRAM(S): 80 TABLET, FILM COATED ORAL at 21:18

## 2025-03-27 RX ADMIN — Medication 650 MILLIGRAM(S): at 20:08

## 2025-03-27 RX ADMIN — TAMSULOSIN HYDROCHLORIDE 0.4 MILLIGRAM(S): 0.4 CAPSULE ORAL at 21:18

## 2025-03-27 RX ADMIN — SODIUM CHLORIDE 50 MILLILITER(S): 9 INJECTION, SOLUTION INTRAVENOUS at 14:41

## 2025-03-27 RX ADMIN — KETOROLAC TROMETHAMINE 15 MILLIGRAM(S): 30 INJECTION, SOLUTION INTRAMUSCULAR; INTRAVENOUS at 23:20

## 2025-03-27 RX ADMIN — Medication 1 APPLICATION(S): at 05:48

## 2025-03-27 RX ADMIN — MEROPENEM 280 MILLIGRAM(S): 1 INJECTION INTRAVENOUS at 12:59

## 2025-03-27 RX ADMIN — QUETIAPINE FUMARATE 12.5 MILLIGRAM(S): 25 TABLET ORAL at 21:19

## 2025-03-27 RX ADMIN — Medication 10 MILLIGRAM(S): at 05:52

## 2025-03-27 RX ADMIN — MEROPENEM 280 MILLIGRAM(S): 1 INJECTION INTRAVENOUS at 05:50

## 2025-03-27 RX ADMIN — METOPROLOL SUCCINATE 25 MILLIGRAM(S): 50 TABLET, EXTENDED RELEASE ORAL at 21:18

## 2025-03-27 RX ADMIN — MEROPENEM 280 MILLIGRAM(S): 1 INJECTION INTRAVENOUS at 21:38

## 2025-03-27 NOTE — PROGRESS NOTE ADULT - ASSESSMENT
1. SDH on DOAC   agree with holding off doAC    holding off drainage with close monitor for expansion of hematoma   SDh    2.  late onset alzheimer's dementia with hallucination    3 paroxysmal Afib on xarelto ..on hold      4  Aortic Stenosis s/p AV replacement; CAD s/p CABG, CHF,  HTN and hyperlipidema presenting with AMS    5. sepsis with bacteremia gNR   Zosyn 3.375 gram q 8 hours. (S/D of abx 3/19)   long term antibx>> s/p PICC line     will follow

## 2025-03-27 NOTE — PROGRESS NOTE ADULT - ASSESSMENT
This is a 84 year old male with PMH of late onset alzheimer's dementia with hallucination, paroxysmal Afib on xarelto , Aortic Stenosis s/p AV replacement; CAD s/p CABG, CHF,  HTN and hyperlipidema presenting with AMS    IMPRESSION  #Metabolic encephalopathy   #Subdural hematomas/Hygromas- Currently can not rule out the possibility of abcess or seeding of infection there.   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  ESR 75, .9 3/25/2025     RECOMMENDATIONS  -Blood cultures NGTD 3/24/2025.   -Follow up with Neurology and Neurosurgery.   -Follow up with Cardiology. It will be ideal if he received a ALBERTO to further define the vegetation.  -Follow up with cardiac chest CT.   -IV plan for IV meropenem 2 gram q 8 hours from 3/24/2025.   -Weekly CBC, CMP, ESR/CRP  - ID follow-up with Dr. Jai Woods for Telehealth. We will call the patient between 10:30-1:30      [address]       [phone]       Fax [phone]  -Will need repeat imaging of the head before stopping the abx. Repeat TTE in the outpatient setting.   -Off loading to prevent pressure sores and preventive measures to avoid aspiration. GOC    If any questions, please send a message or call on [employer] Teams  Please continue to update ID with any pertinent new laboratory or radiographic findings.    Mohan Benz M.D  Infectious Diseases Attending/   [employer] at [facility]

## 2025-03-27 NOTE — PRE PROCEDURE NOTE - PRE PROCEDURE EVALUATION
Vascular & Interventional Radiology Pre-Procedure Note    Procedure Name: Image guided peripherally inserted central catheter with sedation/anesthesia    HPI: HPI:  85 y/o man with PMH of late onset alzheimer's dementia with hallucination, paroxysmal Afib on xarelto , Aortic Stenosis s/p AV replacement; CAD s/p CABG, CHF,  HTN and hyperlipidema presents to IR for Image guided peripherally inserted central catheter with sedation/anesthesia      Allergies: No Known Allergies    Medications (Abx/Cardiac/Anticoagulation/Blood Products)    amLODIPine   Tablet: 2.5 milliGRAM(s) Oral (03-26 @ 05:51)  furosemide    Tablet: 20 milliGRAM(s) Oral (03-26 @ 05:51)  meropenem  IVPB: 280 mL/Hr IV Intermittent (03-27 @ 05:50)  metoprolol tartrate: 25 milliGRAM(s) Oral (03-26 @ 21:39)    Data:    T(C): 36.1  HR: 59  BP: 175/69  RR: 20  SpO2: 96%    Exam  General: NAD, AAO x3, no distress  Chest: breathing comfortably on room air, CTAB  Abdomen: soft, non-tender, non- distended   Extremities: positive pulses bilaterally x4    Labs:   -WBC 10.07 / HgB 9.3 / Hct 27.6 / Plt 387  -Na 144 / Cl 110 / BUN 24 / Glucose 103  -K 4.4 / CO2 26 / Cr 0.7  -ALT 29 / Alk Phos 111 / T.Bili 0.5      Consentable: [x ] Yes   [ ] No     Plan:   -85y Male presents to IR Image guided peripherally inserted central catheter with sedation/anesthesia  -Risks/Benefits/alternatives explained with the patient and/or healthcare proxy and witnessed informed consent obtained.    Vascular & Interventional Radiology Pre-Procedure Note    Procedure Name: Image guided peripherally inserted central catheter    HPI: HPI:  85 y/o man with PMH of late onset alzheimer's dementia with hallucination, paroxysmal Afib on xarelto , Aortic Stenosis s/p AV replacement; CAD s/p CABG, CHF,  HTN and hyperlipidema presents to IR for Image guided peripherally inserted central catheter      Allergies: No Known Allergies    Medications (Abx/Cardiac/Anticoagulation/Blood Products)    amLODIPine   Tablet: 2.5 milliGRAM(s) Oral (03-26 @ 05:51)  furosemide    Tablet: 20 milliGRAM(s) Oral (03-26 @ 05:51)  meropenem  IVPB: 280 mL/Hr IV Intermittent (03-27 @ 05:50)  metoprolol tartrate: 25 milliGRAM(s) Oral (03-26 @ 21:39)    Data:    T(C): 36.1  HR: 59  BP: 175/69  RR: 20  SpO2: 96%    Exam  General: NAD, AAO x3, no distress  Chest: breathing comfortably on room air, CTAB  Abdomen: soft, non-tender, non- distended   Extremities: positive pulses bilaterally x4    Labs:   -WBC 10.07 / HgB 9.3 / Hct 27.6 / Plt 387  -Na 144 / Cl 110 / BUN 24 / Glucose 103  -K 4.4 / CO2 26 / Cr 0.7  -ALT 29 / Alk Phos 111 / T.Bili 0.5      Consentable: [x ] Yes   [ ] No     Plan:   -85y Male presents to IR Image guided peripherally inserted central catheter  -Risks/Benefits/alternatives explained with the patient and/or healthcare proxy and witnessed informed consent obtained.

## 2025-03-27 NOTE — PROGRESS NOTE ADULT - SUBJECTIVE AND OBJECTIVE BOX
HPI:  83 y/o man with PMH of late onset alzheimer's dementia with hallucination (followed by Dr. Juan Neurologist), paroxysmal Afib on xarelto ,   Aortic Stenosis s/p AV replacement; CAD s/p CABG, CHF,  HTN and hyperlipidema presenting with AMS that has been going on for the past 3 days.  fever noticed in the ed.  Patient is lethargic.  unable to obtain ros/hx  as per wife, patient might has fell several months ago  In course of hospitalization, pt found to have SDH while on DOAC.  Currently being on hold.   Patient declined transfer to [place] for neurosurgery evaluation with full understanding of risks, benefits, and alteratives  discussed case with neuro surgery who recommended to hold off the anticoagulation, and head CT scan in 6 hrs      (19 Mar 2025 23:59)        REVIEW OF SYSTEMS:  CONSTITUTIONAL: No fever or chills  HEENT: No sore throat  RESPIRATORY: No cough, no shortness of breath  CARDIOVASCULAR: No chest pain or palpitations  GASTROINTESTINAL: No abdominal or epigastric pain  GENITOURINARY: No dysuria  NEUROLOGICAL: No headache/dizziness  MSK: No joint pain, erythema, or swelling; no back pain  SKIN: No itching, rashes  All other ROS negative except noted above    PAST MEDICAL & SURGICAL HISTORY:  Afib      CHF (congestive heart failure)      Hypertension      S/P CABG (coronary artery bypass graft)    SOCIAL HISTORY: cannot obtain further history from the patient secondary to altered mental status or sedation      FAMILY HISTORY: No pertinent PMH for first degree relatives.     ANTIMICROBIALS:      ANTIMICROBIALS (past 90 days):  MEDICATIONS  (STANDING):    cefepime   IVPB   100 mL/Hr IV Intermittent (03-19-25 @ 19:00)    cefepime   IVPB   100 mL/Hr IV Intermittent (03-20-25 @ 06:01)    vancomycin  IVPB   250 mL/Hr IV Intermittent (03-20-25 @ 00:43)        OTHER MEDS:   MEDICATIONS  (STANDING):  acetaminophen     Tablet .. 650 every 6 hours PRN  acetaminophen   IVPB .. 1000 once  aMIOdarone    Tablet 100 <User Schedule>  atorvastatin 10 at bedtime  finasteride 5 daily  ondansetron Injectable 4 every 6 hours PRN  pantoprazole    Tablet 40 before breakfast  QUEtiapine 12.5 at bedtime  senna 2 at bedtime PRN  sertraline 50 daily  tamsulosin 0.4 at bedtime      VITALS:  Vital Signs Last 24 Hrs  T(F): 98.7 (03-20-25 @ 14:25), Max: 103.8 (03-19-25 @ 23:57)    Vital Signs Last 24 Hrs  HR: 63 (03-20-25 @ 14:25) (63 - 80)  BP: 138/59 (03-20-25 @ 14:25) (127/67 - 170/72)  RR: 20 (03-20-25 @ 14:25)  SpO2: 96% (03-20-25 @ 14:25) (94% - 97%)  Wt(kg): --    EXAM:  GENERAL: Somnolent. Grimacing with sternal rub.   HEAD: No head lesions  NECK: Supple  CHEST/LUNG: Shallow breath sounds.   HEART: S1 S2  ABDOMEN: Soft, nontender.  EXTREMITIES: No clubbing  NERVOUS SYSTEM: Somnolent.   MSK: No joint erythema, swelling or pain  SKIN: No rashes or lesions, no superficial thrombophlebitis    Labs:                        10.8   12.94 )-----------( 212      ( 19 Mar 2025 18:30 )             31.5   >> hb 8     03-19    143  |  106  |  29[H]  ----------------------------<  117[H]  4.4   |  27  |  1.1    Ca    8.3[L]      19 Mar 2025 18:30    TPro  6.0  /  Alb  3.2[L]  /  TBili  0.5  /  DBili  x   /  AST  28  /  ALT  31  /  AlkPhos  121[H]  03-19      WBC Trend:  WBC Count: 12.94 (03-19-25 @ 18:30)      Auto Neutrophil #: 3.96 K/uL (06-30-24 @ 07:21)  Auto Neutrophil #: 6.58 K/uL (06-28-24 @ 07:09)  Auto Neutrophil #: 6.55 K/uL (06-27-24 @ 06:53)  Auto Neutrophil #: 3.06 K/uL (06-25-24 @ 08:12)  Auto Neutrophil #: 3.41 K/uL (06-24-24 @ 18:40)            Trend LDH                INTERPRETATION:  CLINICAL INFORMATION: Fever, abdominal pain    COMPARISON: CT ABDOMEN/PELVIS 3/19/2025.    CONTRAST/COMPLICATIONS:  IV Contrast: Omnipaque 350  90 cc administered   10 cc discarded  Oral Contrast: NONE    PROCEDURE:  CT of the Abdomen and Pelvis was performed.  Sagittal and coronal reformats were performed.    FINDINGS:  LOWER CHEST: Bibasilar atelectasis. Poststernotomy. Cardiomegaly    LIVER: Within normal limits.  BILE DUCTS: Normal caliber.  GALLBLADDER: Cholelithiasis.  SPLEEN: Within normal limits.  PANCREAS: Within normal limits.  ADRENALS: Within normal limits.  KIDNEYS/URETERS: No renal stones or hydronephrosis.    BLADDER: Mild urinary bladder wall thickening  REPRODUCTIVE ORGANS: Unremarkable at CT.    BOWEL: Stool impaction the rectum measuring up to 9 cm. Mild degree of   perirectal fat stranding. Additional desiccated stool within the colon.  PERITONEUM/RETROPERITONEUM: Within normal limits.  VESSELS: Atherosclerotic changes with extensive visceral vascular   calcifications. Circumaortic left renal vein  LYMPH NODES: No lymphadenopathy.  ABDOMINAL WALL: Small fat-containing umbilical hernia  BONES: Osteopenia. Subacute/chronic left L2 transverse process deformity    IMPRESSION:  Stool impaction the rectum measuring up to 9 cm with mild to moderate   degree of perirectal fat stranding    --- End of Report ---            SCARLET SERVIN MD; Attending Radiologist  This document has been electronically signed. Mar 19 2025  8:22PM (03-19-25 @ 20:01)    < from: CT Head No Cont (03.20.25 @ 00:28) >  IMPRESSION:    Increased attenuation of the dural reflections and intracranial vessels   likely reflecting residual contrast from recent intravenous contrast   administration.    Redemonstration of bilateral hemispheric subdural collections likely   reflecting subacute to chronic subdural hematomas, grossly stable since   prior. No mass effect or midline shift.    ATTENDING ADDENDUM: The density of the bilateral cerebral convexity and   falcine subdural collections appears uniformly increased when compared to   the previous head CT probably reflecting IV contrast. Recommend   short-term follow-up imaging.    --- End of Report ---    < end of copied text >  < from: CT Head No Cont (03.19.25 @ 19:53) >  Motion limited examination.    Predominantly low density bilateral subdural collections are noted likely   reflecting subacute to chronic subdural hematomas. No significant mass   effect or midline shift.    < end of copied text >  < from: CT Chest w/ IV Cont (06.24.24 @ 20:14) >  IMPRESSION:    Cholelithiasis.    No evidence of thoracic, abdominal or pelvic visceral injury, laceration,   or hematoma.    No evidence of acute fracture.    --- End of Report ---    < end of copied text >

## 2025-03-27 NOTE — PROGRESS NOTE ADULT - SUBJECTIVE AND OBJECTIVE BOX
Patient is a 85y old  Male who presents with a chief complaint of AMS (26 Mar 2025 16:11)        MEDICATIONS  (STANDING):  aMIOdarone    Tablet 100 milliGRAM(s) Oral <User Schedule>  amLODIPine   Tablet 10 milliGRAM(s) Oral every 24 hours  atorvastatin 10 milliGRAM(s) Oral at bedtime  chlorhexidine 2% Cloths 1 Application(s) Topical <User Schedule>  dextrose 5% + sodium chloride 0.45%. 1000 milliLiter(s) (50 mL/Hr) IV Continuous <Continuous>  finasteride 5 milliGRAM(s) Oral daily  meropenem  IVPB 2000 milliGRAM(s) IV Intermittent every 8 hours  metoprolol tartrate 25 milliGRAM(s) Oral at bedtime  metoprolol tartrate 25 milliGRAM(s) Oral daily  pantoprazole    Tablet 40 milliGRAM(s) Oral before breakfast  QUEtiapine 12.5 milliGRAM(s) Oral at bedtime  sertraline 50 milliGRAM(s) Oral daily  tamsulosin 0.4 milliGRAM(s) Oral at bedtime    MEDICATIONS  (PRN):  acetaminophen     Tablet .. 650 milliGRAM(s) Oral every 6 hours PRN Temp greater or equal to 38C (100.4F), Mild Pain (1 - 3)  acetaminophen  Suppository .. 650 milliGRAM(s) Rectal every 6 hours PRN Temp greater or equal to 38C (100.4F), Mild Pain (1 - 3)  hydrALAZINE 10 milliGRAM(s) Oral every 8 hours PRN Systolic blood pressure >  hydrALAZINE Injectable 10 milliGRAM(s) IV Push every 8 hours PRN SBP greater than 170  ondansetron Injectable 4 milliGRAM(s) IV Push every 6 hours PRN Nausea  senna 2 Tablet(s) Oral at bedtime PRN Constipation      CAPILLARY BLOOD GLUCOSE  I&O's Summary    26 Mar 2025 07:01  -  27 Mar 2025 07:00  --------------------------------------------------------  IN: 490 mL / OUT: 2550 mL / NET: -2060 mL        PHYSICAL EXAM:  Vital Signs Last 24 Hrs  T(C): 36.1 (27 Mar 2025 04:41), Max: 37.5 (26 Mar 2025 13:07)  T(F): 97 (27 Mar 2025 04:41), Max: 99.5 (26 Mar 2025 13:07)  HR: 59 (27 Mar 2025 04:41) (56 - 72)  BP: 175/69 (27 Mar 2025 04:41) (149/63 - 175/69)  BP(mean): --  RR: 20 (27 Mar 2025 04:41) (18 - 20)  SpO2: 96% (27 Mar 2025 04:41) (95% - 96%)    Parameters below as of 26 Mar 2025 20:18  Patient On (Oxygen Delivery Method): room air    GENERAL: No acute distress,  intermittent lethargy  HEAD:  Atraumatic, Normocephalic  EYES: closes eyes  NECK: Supple, no JVD  CHEST/LUNG: CTAB; No wheezes, rales, or rhonchi  HEART: Regular rate and rhythm; No murmurs  ABDOMEN: Soft, non-tender, non-distended;  EXTREMITIES: No clubbing, cyanosis, or edema  NEUROLOGY: A&O x 0, no focal deficits,  SKIN: No rashes or lesions      LABS:                        9.3    10.07 )-----------( 387      ( 27 Mar 2025 06:42 )             27.6     03-27    144  |  110  |  24[H]  ----------------------------<  103[H]  4.4   |  26  |  0.7    Ca    8.4      27 Mar 2025 06:42    TPro  5.9[L]  /  Alb  2.9[L]  /  TBili  0.5  /  DBili  x   /  AST  29  /  ALT  29  /  AlkPhos  111  03-27          Urinalysis Basic - ( 27 Mar 2025 06:42 )    Color: x / Appearance: x / SG: x / pH: x  Gluc: 103 mg/dL / Ketone: x  / Bili: x / Urobili: x   Blood: x / Protein: x / Nitrite: x   Leuk Esterase: x / RBC: x / WBC x   Sq Epi: x / Non Sq Epi: x / Bacteria: x

## 2025-03-27 NOTE — PROGRESS NOTE ADULT - SUBJECTIVE AND OBJECTIVE BOX
CISCO TONJA  85y, Male    LOS  8d    INTERVAL EVENTS/HPI  - No acute events overnight  - T(F): , Max: 99.8 (03-27-25 @ 13:10)  - WBC Count: 10.07 (03-27-25 @ 06:42)  WBC Count: 12.58 (03-26-25 @ 05:59)  - Creatinine: 0.7 (03-27-25 @ 06:42)  Creatinine: 0.8 (03-26-25 @ 05:59)    REVIEW OF SYSTEMS:  CONSTITUTIONAL: No fever or chills  HEENT: No sore throat  RESPIRATORY: No cough, no shortness of breath  CARDIOVASCULAR: No chest pain or palpitations  GASTROINTESTINAL: No abdominal or epigastric pain  GENITOURINARY: No dysuria  NEUROLOGICAL: No headache/dizziness  MSK: No joint pain, erythema, or swelling; no back pain  SKIN: No itching, rashes  All other ROS negative except noted above    Prior hospital charts reviewed [Yes]  Primary team notes reviewed [Yes]  Other consultant notes reviewed [Yes]    ANTIMICROBIALS:   meropenem  IVPB 2000 every 8 hours      OTHER MEDS: MEDICATIONS  (STANDING):  acetaminophen     Tablet .. 650 every 6 hours PRN  acetaminophen  Suppository .. 650 every 6 hours PRN  aMIOdarone    Tablet 100 <User Schedule>  amLODIPine   Tablet 10 every 24 hours  atorvastatin 10 at bedtime  finasteride 5 daily  hydrALAZINE 10 every 8 hours PRN  hydrALAZINE Injectable 10 every 8 hours PRN  metoprolol tartrate 25 at bedtime  metoprolol tartrate 25 daily  ondansetron Injectable 4 every 6 hours PRN  pantoprazole    Tablet 40 before breakfast  QUEtiapine 12.5 at bedtime  senna 2 at bedtime PRN  sertraline 50 daily  tamsulosin 0.4 at bedtime      Vital Signs Last 24 Hrs  T(F): 99.8 (03-27-25 @ 13:10), Max: 102 (03-21-25 @ 03:05)    Vital Signs Last 24 Hrs  HR: 55 (03-27-25 @ 13:10) (53 - 72)  BP: 148/64 (03-27-25 @ 13:10) (148/64 - 175/69)  RR: 18 (03-27-25 @ 13:10)  SpO2: 97% (03-27-25 @ 13:10) (95% - 97%)  Wt(kg): --    EXAM:  GENERAL: In NAD  HEAD: No head lesions  NECK: Supple  CHEST/LUNG: Shallow breath sounds.   HEART: S1 S2  ABDOMEN: Soft, nontender.  EXTREMITIES: No clubbing  NERVOUS SYSTEM: Somnolent.   MSK: No joint erythema, swelling or pain  SKIN: No rashes or lesions    Labs:                        9.3    10.07 )-----------( 387      ( 27 Mar 2025 06:42 )             27.6     03-27    144  |  110  |  24[H]  ----------------------------<  103[H]  4.4   |  26  |  0.7    Ca    8.4      27 Mar 2025 06:42    TPro  5.9[L]  /  Alb  2.9[L]  /  TBili  0.5  /  DBili  x   /  AST  29  /  ALT  29  /  AlkPhos  111  03-27      WBC Trend:  WBC Count: 10.07 (03-27-25 @ 06:42)  WBC Count: 12.58 (03-26-25 @ 05:59)  WBC Count: 11.67 (03-25-25 @ 05:58)  WBC Count: 12.32 (03-24-25 @ 06:45)      Creatine Trend:  Creatinine: 0.7 (03-27)  Creatinine: 0.8 (03-26)  Creatinine: 0.7 (03-25)  Creatinine: 0.8 (03-24)      Liver Biochemical Testing Trend:  Alanine Aminotransferase (ALT/SGPT): 29 (03-27)  Alanine Aminotransferase (ALT/SGPT): 36 (03-25)  Alanine Aminotransferase (ALT/SGPT): 21 (03-22)  Alanine Aminotransferase (ALT/SGPT): 24 (03-21)  Alanine Aminotransferase (ALT/SGPT): 31 (03-19)  Aspartate Aminotransferase (AST/SGOT): 29 (03-27-25 @ 06:42)  Aspartate Aminotransferase (AST/SGOT): 38 (03-25-25 @ 05:58)  Aspartate Aminotransferase (AST/SGOT): 22 (03-22-25 @ 04:30)  Aspartate Aminotransferase (AST/SGOT): 23 (03-21-25 @ 07:04)  Aspartate Aminotransferase (AST/SGOT): 28 (03-19-25 @ 18:30)  Bilirubin Total: 0.5 (03-27)  Bilirubin Total: 0.3 (03-25)  Bilirubin Total: 0.4 (03-22)  Bilirubin Total: 0.6 (03-21)  Bilirubin Total: 0.5 (03-19)      Trend LDH      Urinalysis Basic - ( 27 Mar 2025 06:42 )    Color: x / Appearance: x / SG: x / pH: x  Gluc: 103 mg/dL / Ketone: x  / Bili: x / Urobili: x   Blood: x / Protein: x / Nitrite: x   Leuk Esterase: x / RBC: x / WBC x   Sq Epi: x / Non Sq Epi: x / Bacteria: x        MICROBIOLOGY:    Male    Culture - Blood (collected 24 Mar 2025 06:45)  Source: Blood None  Preliminary Report:    No growth at 48 Hours    Culture - Blood (collected 21 Mar 2025 07:04)  Source: Blood None  Final Report:    Growth in aerobic bottle: Pseudomonas aeruginosa See previous culture    [id number]    Culture - Urine (collected 19 Mar 2025 19:35)  Source: Catheterized None  Final Report:    >100,000 CFU/ml Pseudomonas aeruginosa  Organism: Pseudomonas aeruginosa  Organism: Pseudomonas aeruginosa    Sensitivities:      Method Type: NIALL      -  Amikacin: S <=16      -  Aztreonam: R >16      -  Cefepime: R >16      -  Ceftazidime: R >16      -  Ciprofloxacin: I 1      -  Imipenem: S <=1      -  Levofloxacin: I 2      -  Meropenem: S <=1      -  Piperacillin/Tazobactam: R >64    Urinalysis with Rflx Culture (collected 19 Mar 2025 19:35)    Culture - Blood (collected 19 Mar 2025 18:30)  Source: Blood Blood-Peripheral  Final Report:    Growth in aerobic bottle: Pseudomonas aeruginosa    Direct identification is available within approximately 3-5    hours either by Blood Panel Multiplexed PCR or Direct    MALDI-TOF. Details: [email or web address]  Organism: Blood Culture PCR  Pseudomonas aeruginosa  Pseudomonas aeruginosa  Organism: Pseudomonas aeruginosa    Sensitivities:      Method Type: ETEST      -  Tobramycin: S 1  Organism: Pseudomonas aeruginosa    Sensitivities:      Method Type: NIALL      -  Aztreonam: S <=4      -  Cefepime: S 8      -  Ceftazidime: S 4      -  Ciprofloxacin: I 1      -  Imipenem: S <=1      -  Levofloxacin: I 2      -  Meropenem: S <=1      -  Piperacillin/Tazobactam: S <=8  Organism: Blood Culture PCR    Sensitivities:      Method Type: PCR      -  Pseudomonas aeruginosa: Detec    Culture - Blood (collected 19 Mar 2025 18:30)  Source: Blood Blood-Peripheral  Final Report:    Growth in aerobic bottle: Pseudomonas aeruginosa    See previous culture [id number]    C-Reactive Protein: 129.9 (03-25)    RADIOLOGY & ADDITIONAL TESTS:  I have personally reviewed the relevant images.   CXR      CT  < from: Xray Shoulder 2 Views, Left (03.22.25 @ 22:23) >  FINDINGS/  IMPRESSION:    No acute fractures or dislocations are seen.    The left shoulder is high riding. There is no evidence of significant   degenerative disease.    The bones are osteopenic.    --- End of Report ---    < end of copied text >        WEIGHT  Weight (kg): 66 (03-19-25 @ 23:57)  Creatinine: 0.7 mg/dL (03-27-25 @ 06:42)      All available historical records have been reviewed

## 2025-03-27 NOTE — PROCEDURE NOTE - NSPROCDETAILS_GEN_ALL_CORE
location identified, draped/prepped, sterile technique used/ultrasound guidance
sterile technique, indwelling urinary device inserted

## 2025-03-27 NOTE — PROGRESS NOTE ADULT - ASSESSMENT
Mr Kell is a 85 YOM w a PMH of alzheimer's dementia with hallucination (followed by Dr. Juan Neurologist), HFpEF, HTN, paroxysmal Afib on xarelto , Aortic Stenosis s/p AV replacement; CAD s/p CABG and hyperlipidemia presenting with worsening confusion        Acute Cystis: ucx: pseudomonas aeruginosa   Bacteriemia: pseudomonas aeruginosa   Possible Endocarditis    SIRS present on admission >>progressed to sepsis   Likely Infectious from Acute cystitis and possibly some contribution from cerebral Hematoma/hygroma  Head CT: subacute to chronic subdural hematomas. No significant mass effect or midline shift.  Neurosurgery Recommended Transfer to [place] but Family declined understanding risk of brain herniation and death   Patient on Xarelto for AFIB: continue to hold for now AC for 4 weeks per Neurosurgery   Repeat Head CT: The density of the bilateral cerebral convexity and falcine subdural collections appears uniformly increased when compared to   the previous head CT probably reflecting IV contrast. Recommend short-term follow-up imaging.  2D-ECHO: EF 52%, Severe LVH, Bioprosthetic aortic valve with significant stenosis, Moderate prosthetic valve regurgitation.  Possible small, mobile vegetation on atrial-side of anterior leaflet   PATIENT IS NOT A CANDIDATE FOR ALBERTO PER CARDIO, GIVEN BACTEREMIA  PLAN FOR PICC PLACEMENT THURSDAY  TODAY  Cardiac CT recommended by ID, Radiologist aware ,  *** HR rate needs to be 50-60's  and 2hrs after food and before 4pm)       Metabolic Encephalopathy>>still with intermittent lethargy  Advanced  Dementia   Most Likely infectious with some possibility of Subdural cerebral hematoma/hygroma  Neurosurgery consult appreciated>>Family refused transfer to [place]   **Recs to keep holding anticoagulation for at least 4 weeks with repeat CT head .O/P follow-up with Dr Supriya Amor   Neuro Endocrine: - Potential procedural planning for MMA embolization  will be delayed until the patient's infectious workup has been completed.    ID consult appreciated >>Continue IV antibiotics  Hold  Seroquel, continue daily re-orientation    Hypovolemic Hypernatremia  Continue IV hydration   Patient intermittently alert per feeding        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  AORTIC Stenosis s/p AV replacement   Paroxysmal AFIB + HTN + HLD   -BP now elevated, started on Norvasc and IV hydralazine for SBP greater than 160  -Continue Amiodarone , Statin, and DASH    Chronic BPH with acute urinary Retention   S/P Saenz catheter's insertion 3/25  continue Finasteride  and Flomax    DVT PPX: Xarelto on hold for Hematoma/hygroma for 4 weeks, SCD's for now   GI PPX: PPI   DNI/DNR   Dispo: From Home   Pending Clinical Improvement, acute pending defervescence for 24hrs

## 2025-03-28 PROCEDURE — 99233 SBSQ HOSP IP/OBS HIGH 50: CPT

## 2025-03-28 PROCEDURE — 99232 SBSQ HOSP IP/OBS MODERATE 35: CPT

## 2025-03-28 PROCEDURE — 93971 EXTREMITY STUDY: CPT | Mod: 26,LT

## 2025-03-28 RX ORDER — IBUPROFEN 200 MG
600 TABLET ORAL THREE TIMES A DAY
Refills: 0 | Status: DISCONTINUED | OUTPATIENT
Start: 2025-03-28 | End: 2025-03-31

## 2025-03-28 RX ORDER — RIVAROXABAN 10 MG/1
20 TABLET, FILM COATED ORAL
Refills: 0 | Status: DISCONTINUED | OUTPATIENT
Start: 2025-03-28 | End: 2025-03-31

## 2025-03-28 RX ADMIN — MEROPENEM 280 MILLIGRAM(S): 1 INJECTION INTRAVENOUS at 16:29

## 2025-03-28 RX ADMIN — ATORVASTATIN CALCIUM 10 MILLIGRAM(S): 80 TABLET, FILM COATED ORAL at 21:05

## 2025-03-28 RX ADMIN — MEROPENEM 280 MILLIGRAM(S): 1 INJECTION INTRAVENOUS at 05:07

## 2025-03-28 RX ADMIN — SODIUM CHLORIDE 50 MILLILITER(S): 9 INJECTION, SOLUTION INTRAVENOUS at 21:03

## 2025-03-28 RX ADMIN — SERTRALINE 50 MILLIGRAM(S): 100 TABLET, FILM COATED ORAL at 12:29

## 2025-03-28 RX ADMIN — FINASTERIDE 5 MILLIGRAM(S): 1 TABLET, FILM COATED ORAL at 12:26

## 2025-03-28 RX ADMIN — RIVAROXABAN 20 MILLIGRAM(S): 10 TABLET, FILM COATED ORAL at 16:54

## 2025-03-28 RX ADMIN — MEROPENEM 280 MILLIGRAM(S): 1 INJECTION INTRAVENOUS at 22:55

## 2025-03-28 RX ADMIN — Medication 1 APPLICATION(S): at 05:09

## 2025-03-28 RX ADMIN — Medication 650 MILLIGRAM(S): at 18:30

## 2025-03-28 RX ADMIN — QUETIAPINE FUMARATE 12.5 MILLIGRAM(S): 25 TABLET ORAL at 21:04

## 2025-03-28 RX ADMIN — METOPROLOL SUCCINATE 25 MILLIGRAM(S): 50 TABLET, EXTENDED RELEASE ORAL at 05:07

## 2025-03-28 RX ADMIN — Medication 650 MILLIGRAM(S): at 21:30

## 2025-03-28 RX ADMIN — AMLODIPINE BESYLATE 10 MILLIGRAM(S): 10 TABLET ORAL at 05:08

## 2025-03-28 RX ADMIN — TAMSULOSIN HYDROCHLORIDE 0.4 MILLIGRAM(S): 0.4 CAPSULE ORAL at 21:04

## 2025-03-28 RX ADMIN — Medication 650 MILLIGRAM(S): at 21:04

## 2025-03-28 RX ADMIN — Medication 40 MILLIGRAM(S): at 05:07

## 2025-03-28 NOTE — PROGRESS NOTE ADULT - SUBJECTIVE AND OBJECTIVE BOX
TONJA PECK  85y, Male    LOS  9d    INTERVAL EVENTS/HPI  - T(F): , Max: 101.8 (03-27-25 @ 22:41)  - WBC Count: 10.07 (03-27-25 @ 06:42)  WBC Count: 12.58 (03-26-25 @ 05:59)  - Creatinine: 0.7 (03-27-25 @ 06:42)    REVIEW OF SYSTEMS:  CONSTITUTIONAL: No fever or chills  HEENT: No sore throat  RESPIRATORY: No cough, no shortness of breath  CARDIOVASCULAR: No chest pain or palpitations  GASTROINTESTINAL: No abdominal or epigastric pain  GENITOURINARY: No dysuria  NEUROLOGICAL: No headache/dizziness  MSK: No joint pain, erythema, or swelling; no back pain  SKIN: No itching, rashes  All other ROS negative except noted above    Prior hospital charts reviewed [Yes]  Primary team notes reviewed [Yes]  Other consultant notes reviewed [Yes]    ANTIMICROBIALS:   meropenem  IVPB 2000 every 8 hours      OTHER MEDS: MEDICATIONS  (STANDING):  acetaminophen     Tablet .. 650 every 6 hours PRN  acetaminophen  Suppository .. 650 every 6 hours PRN  aMIOdarone    Tablet 100 <User Schedule>  amLODIPine   Tablet 10 every 24 hours  atorvastatin 10 at bedtime  finasteride 5 daily  hydrALAZINE 25 every 8 hours PRN  hydrALAZINE Injectable 10 every 8 hours PRN  ondansetron Injectable 4 every 6 hours PRN  pantoprazole    Tablet 40 before breakfast  QUEtiapine 12.5 at bedtime  senna 2 at bedtime PRN  sertraline 50 daily  tamsulosin 0.4 at bedtime      Vital Signs Last 24 Hrs  T(F): 98.4 (03-28-25 @ 05:03), Max: 101.8 (03-24-25 @ 22:19)    Vital Signs Last 24 Hrs  HR: 52 (03-28-25 @ 08:16) (52 - 83)  BP: 158/59 (03-28-25 @ 08:16) (147/70 - 178/88)  RR: 16 (03-28-25 @ 05:03)  SpO2: 99% (03-28-25 @ 05:03) (97% - 99%)  Wt(kg): --    EXAM:  GENERAL: In NAD  HEAD: No head lesions  NECK: Supple  CHEST/LUNG: Shallow breath sounds.   HEART: S1 S2  ABDOMEN: Soft, nontender.  EXTREMITIES: No clubbing  NERVOUS SYSTEM: Awake.  MSK: No joint erythema, swelling or pain  SKIN: No rashes or lesions. +PICC line    Labs:                        9.3    10.07 )-----------( 387      ( 27 Mar 2025 06:42 )             27.6     03-27    144  |  110  |  24[H]  ----------------------------<  103[H]  4.4   |  26  |  0.7    Ca    8.4      27 Mar 2025 06:42    TPro  5.9[L]  /  Alb  2.9[L]  /  TBili  0.5  /  DBili  x   /  AST  29  /  ALT  29  /  AlkPhos  111  03-27      WBC Trend:  WBC Count: 10.07 (03-27-25 @ 06:42)  WBC Count: 12.58 (03-26-25 @ 05:59)  WBC Count: 11.67 (03-25-25 @ 05:58)  WBC Count: 12.32 (03-24-25 @ 06:45)      Creatine Trend:  Creatinine: 0.7 (03-27)  Creatinine: 0.8 (03-26)  Creatinine: 0.7 (03-25)  Creatinine: 0.8 (03-24)      Liver Biochemical Testing Trend:  Alanine Aminotransferase (ALT/SGPT): 29 (03-27)  Alanine Aminotransferase (ALT/SGPT): 36 (03-25)  Alanine Aminotransferase (ALT/SGPT): 21 (03-22)  Alanine Aminotransferase (ALT/SGPT): 24 (03-21)  Alanine Aminotransferase (ALT/SGPT): 31 (03-19)  Aspartate Aminotransferase (AST/SGOT): 29 (03-27-25 @ 06:42)  Aspartate Aminotransferase (AST/SGOT): 38 (03-25-25 @ 05:58)  Aspartate Aminotransferase (AST/SGOT): 22 (03-22-25 @ 04:30)  Aspartate Aminotransferase (AST/SGOT): 23 (03-21-25 @ 07:04)  Aspartate Aminotransferase (AST/SGOT): 28 (03-19-25 @ 18:30)  Bilirubin Total: 0.5 (03-27)  Bilirubin Total: 0.3 (03-25)  Bilirubin Total: 0.4 (03-22)  Bilirubin Total: 0.6 (03-21)  Bilirubin Total: 0.5 (03-19)      Trend LDH      Urinalysis Basic - ( 27 Mar 2025 06:42 )    Color: x / Appearance: x / SG: x / pH: x  Gluc: 103 mg/dL / Ketone: x  / Bili: x / Urobili: x   Blood: x / Protein: x / Nitrite: x   Leuk Esterase: x / RBC: x / WBC x   Sq Epi: x / Non Sq Epi: x / Bacteria: x        MICROBIOLOGY:    Male    Culture - Blood (collected 24 Mar 2025 06:45)  Source: Blood None  Preliminary Report:    No growth at 72 Hours    Culture - Blood (collected 21 Mar 2025 07:04)  Source: Blood None  Final Report:    Growth in aerobic bottle: Pseudomonas aeruginosa See previous culture    [id number]    Culture - Urine (collected 19 Mar 2025 19:35)  Source: Catheterized None  Final Report:    >100,000 CFU/ml Pseudomonas aeruginosa  Organism: Pseudomonas aeruginosa  Organism: Pseudomonas aeruginosa    Sensitivities:      Method Type: NIALL      -  Amikacin: S <=16      -  Aztreonam: R >16      -  Cefepime: R >16      -  Ceftazidime: R >16      -  Ciprofloxacin: I 1      -  Imipenem: S <=1      -  Levofloxacin: I 2      -  Meropenem: S <=1      -  Piperacillin/Tazobactam: R >64    Urinalysis with Rflx Culture (collected 19 Mar 2025 19:35)    Culture - Blood (collected 19 Mar 2025 18:30)  Source: Blood Blood-Peripheral  Final Report:    Growth in aerobic bottle: Pseudomonas aeruginosa    Direct identification is available within approximately 3-5    hours either by Blood Panel Multiplexed PCR or Direct    MALDI-TOF. Details: [email or web address]  Organism: Blood Culture PCR  Pseudomonas aeruginosa  Pseudomonas aeruginosa  Organism: Pseudomonas aeruginosa    Sensitivities:      Method Type: ETEST      -  Tobramycin: S 1  Organism: Pseudomonas aeruginosa    Sensitivities:      Method Type: NIALL      -  Aztreonam: S <=4      -  Cefepime: S 8      -  Ceftazidime: S 4      -  Ciprofloxacin: I 1      -  Imipenem: S <=1      -  Levofloxacin: I 2      -  Meropenem: S <=1      -  Piperacillin/Tazobactam: S <=8  Organism: Blood Culture PCR    Sensitivities:      Method Type: PCR      -  Pseudomonas aeruginosa: Detec    Culture - Blood (collected 19 Mar 2025 18:30)  Source: Blood Blood-Peripheral  Final Report:    Growth in aerobic bottle: Pseudomonas aeruginosa    See previous culture [id number]    C-Reactive Protein: 129.9 (03-25)                    RADIOLOGY & ADDITIONAL TESTS:  I have personally reviewed the relevant images.   CXR      CT        WEIGHT  Weight (kg): 66 (03-19-25 @ 23:57)      All available historical records have been reviewed       TONJA PECK  85y, Male    LOS  9d    INTERVAL EVENTS/HPI  - T(F): , Max: 101.8 (03-27-25 @ 22:41)  - WBC Count: 10.07 (03-27-25 @ 06:42)  WBC Count: 12.58 (03-26-25 @ 05:59)  - Creatinine: 0.7 (03-27-25 @ 06:42)    REVIEW OF SYSTEMS:  CONSTITUTIONAL: No fever or chills  HEENT: No sore throat  RESPIRATORY: No cough, no shortness of breath  CARDIOVASCULAR: No chest pain or palpitations  GASTROINTESTINAL: No abdominal or epigastric pain  GENITOURINARY: No dysuria  NEUROLOGICAL: No headache/dizziness  MSK: No joint pain, erythema, or swelling; no back pain  SKIN: No itching, rashes  All other ROS negative except noted above    Prior hospital charts reviewed [Yes]  Primary team notes reviewed [Yes]  Other consultant notes reviewed [Yes]    ANTIMICROBIALS:   meropenem  IVPB 2000 every 8 hours      OTHER MEDS: MEDICATIONS  (STANDING):  acetaminophen     Tablet .. 650 every 6 hours PRN  acetaminophen  Suppository .. 650 every 6 hours PRN  aMIOdarone    Tablet 100 <User Schedule>  amLODIPine   Tablet 10 every 24 hours  atorvastatin 10 at bedtime  finasteride 5 daily  hydrALAZINE 25 every 8 hours PRN  hydrALAZINE Injectable 10 every 8 hours PRN  ondansetron Injectable 4 every 6 hours PRN  pantoprazole    Tablet 40 before breakfast  QUEtiapine 12.5 at bedtime  senna 2 at bedtime PRN  sertraline 50 daily  tamsulosin 0.4 at bedtime      Vital Signs Last 24 Hrs  T(F): 98.4 (03-28-25 @ 05:03), Max: 101.8 (03-24-25 @ 22:19)    Vital Signs Last 24 Hrs  HR: 52 (03-28-25 @ 08:16) (52 - 83)  BP: 158/59 (03-28-25 @ 08:16) (147/70 - 178/88)  RR: 16 (03-28-25 @ 05:03)  SpO2: 99% (03-28-25 @ 05:03) (97% - 99%)  Wt(kg): --    EXAM:  GENERAL: In NAD  HEAD: No head lesions  NECK: Supple  CHEST/LUNG: Shallow breath sounds.   HEART: S1 S2  ABDOMEN: Soft, nontender.  EXTREMITIES: No clubbing  NERVOUS SYSTEM: Awake.  MSK: No joint erythema, swelling or pain  SKIN: No rashes or lesions. +PICC line    Labs:                        9.3    10.07 )-----------( 387      ( 27 Mar 2025 06:42 )             27.6     03-27    144  |  110  |  24[H]  ----------------------------<  103[H]  4.4   |  26  |  0.7    Ca    8.4      27 Mar 2025 06:42    TPro  5.9[L]  /  Alb  2.9[L]  /  TBili  0.5  /  DBili  x   /  AST  29  /  ALT  29  /  AlkPhos  111  03-27      WBC Trend:  WBC Count: 10.07 (03-27-25 @ 06:42)  WBC Count: 12.58 (03-26-25 @ 05:59)  WBC Count: 11.67 (03-25-25 @ 05:58)  WBC Count: 12.32 (03-24-25 @ 06:45)      Creatine Trend:  Creatinine: 0.7 (03-27)  Creatinine: 0.8 (03-26)  Creatinine: 0.7 (03-25)  Creatinine: 0.8 (03-24)      Liver Biochemical Testing Trend:  Alanine Aminotransferase (ALT/SGPT): 29 (03-27)  Alanine Aminotransferase (ALT/SGPT): 36 (03-25)  Alanine Aminotransferase (ALT/SGPT): 21 (03-22)  Alanine Aminotransferase (ALT/SGPT): 24 (03-21)  Alanine Aminotransferase (ALT/SGPT): 31 (03-19)  Aspartate Aminotransferase (AST/SGOT): 29 (03-27-25 @ 06:42)  Aspartate Aminotransferase (AST/SGOT): 38 (03-25-25 @ 05:58)  Aspartate Aminotransferase (AST/SGOT): 22 (03-22-25 @ 04:30)  Aspartate Aminotransferase (AST/SGOT): 23 (03-21-25 @ 07:04)  Aspartate Aminotransferase (AST/SGOT): 28 (03-19-25 @ 18:30)  Bilirubin Total: 0.5 (03-27)  Bilirubin Total: 0.3 (03-25)  Bilirubin Total: 0.4 (03-22)  Bilirubin Total: 0.6 (03-21)  Bilirubin Total: 0.5 (03-19)      Trend LDH      Urinalysis Basic - ( 27 Mar 2025 06:42 )    Color: x / Appearance: x / SG: x / pH: x  Gluc: 103 mg/dL / Ketone: x  / Bili: x / Urobili: x   Blood: x / Protein: x / Nitrite: x   Leuk Esterase: x / RBC: x / WBC x   Sq Epi: x / Non Sq Epi: x / Bacteria: x        MICROBIOLOGY:    Male    Culture - Blood (collected 24 Mar 2025 06:45)  Source: Blood None  Preliminary Report:    No growth at 72 Hours    Culture - Blood (collected 21 Mar 2025 07:04)  Source: Blood None  Final Report:    Growth in aerobic bottle: Pseudomonas aeruginosa See previous culture    [id number]    Culture - Urine (collected 19 Mar 2025 19:35)  Source: Catheterized None  Final Report:    >100,000 CFU/ml Pseudomonas aeruginosa  Organism: Pseudomonas aeruginosa  Organism: Pseudomonas aeruginosa    Sensitivities:      Method Type: NIALL      -  Amikacin: S <=16      -  Aztreonam: R >16      -  Cefepime: R >16      -  Ceftazidime: R >16      -  Ciprofloxacin: I 1      -  Imipenem: S <=1      -  Levofloxacin: I 2      -  Meropenem: S <=1      -  Piperacillin/Tazobactam: R >64    Urinalysis with Rflx Culture (collected 19 Mar 2025 19:35)    Culture - Blood (collected 19 Mar 2025 18:30)  Source: Blood Blood-Peripheral  Final Report:    Growth in aerobic bottle: Pseudomonas aeruginosa    Direct identification is available within approximately 3-5    hours either by Blood Panel Multiplexed PCR or Direct    MALDI-TOF. Details: [email or web address]  Organism: Blood Culture PCR  Pseudomonas aeruginosa  Pseudomonas aeruginosa  Organism: Pseudomonas aeruginosa    Sensitivities:      Method Type: ETEST      -  Tobramycin: S 1  Organism: Pseudomonas aeruginosa    Sensitivities:      Method Type: NIALL      -  Aztreonam: S <=4      -  Cefepime: S 8      -  Ceftazidime: S 4      -  Ciprofloxacin: I 1      -  Imipenem: S <=1      -  Levofloxacin: I 2      -  Meropenem: S <=1      -  Piperacillin/Tazobactam: S <=8  Organism: Blood Culture PCR    Sensitivities:      Method Type: PCR      -  Pseudomonas aeruginosa: Detec    Culture - Blood (collected 19 Mar 2025 18:30)  Source: Blood Blood-Peripheral  Final Report:    Growth in aerobic bottle: Pseudomonas aeruginosa    See previous culture [id number]    C-Reactive Protein: 129.9 (03-25)                    RADIOLOGY & ADDITIONAL TESTS:  I have personally reviewed the relevant images.   CXR      CT    < from: CT Heart without Coronaries w/ IV Cont (03.27.25 @ 15:49) >  IMPRESSION:    1. Technically difficult study due to patient motion.  2. Bioprosthetic valve in the aortic position. Minimal HALT. No evident   vegetation/mass.  3. No evident vegetation/mass of the remaining valve leaflets.  4. Severe native coronary artery disease, partially imaged. LIMA-LAD   bypass graft, partially imaged. Study not tailored for coronary   artery/bypass graft luminal evaluation.  5. Moderate left atrial enlargement. No ANDRE thrombus.    < end of copied text >      WEIGHT  Weight (kg): 66 (03-19-25 @ 23:57)      All available historical records have been reviewed

## 2025-03-28 NOTE — PROGRESS NOTE ADULT - ASSESSMENT
This is a 84 year old male with PMH of late onset alzheimer's dementia with hallucination, paroxysmal Afib on xarelto , Aortic Stenosis s/p AV replacement; CAD s/p CABG, CHF,  HTN and hyperlipidema presenting with AMS    IMPRESSION  #Metabolic encephalopathy   #Subdural hematomas/Hygromas- Currently can not rule out the possibility of abcess or seeding of infection there.   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  ESR 75, .9 3/25/2025     RECOMMENDATIONS  -Blood cultures NGTD 3/24/2025.   -Follow up with Neurology and Neurosurgery.   -It would be ideal if he received a ALBERTO to further define the vegetation. But due to comorbidities, deferred. Follow up with cardiac CT.  -IV plan for IV meropenem 2 gram q 8 hours from 3/24/2025.   -Weekly CBC, CMP, ESR/CRP  - ID follow-up with Dr. Jai Woods for Telehealth. We will call the patient between 10:30-1:30      [address]       [phone]       Fax [phone]  -Will need repeat imaging of the head before stopping the abx. Repeat TTE in the outpatient setting.   -Off loading to prevent pressure sores and preventive measures to avoid aspiration. GOC    If any questions, please send a message or call on [email or web address] Teams  Please continue to update ID with any pertinent new laboratory or radiographic findings.    Mohan Benz M.D  Infectious Diseases Attending/   [employer] at [facility]     This is a 84 year old male with PMH of late onset alzheimer's dementia with hallucination, paroxysmal Afib on xarelto , Aortic Stenosis s/p AV replacement; CAD s/p CABG, CHF,  HTN and hyperlipidema presenting with AMS    IMPRESSION  #Metabolic encephalopathy   #Subdural hematomas/Hygromas- Currently can not rule out the possibility of abcess or seeding of infection there.   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  ESR 75, .9 3/25/2025     RECOMMENDATIONS  -Blood cultures NGTD 3/24/2025.   -Follow up with Neurology and Neurosurgery.   -Noted isolated fevers. Could be inflammatory.   -It would be ideal if he received a ALBERTO to further define the vegetation. But due to comorbidities, deferred. Cardiac CT is not concerning for vegetation.  -IV plan for IV meropenem 2 gram q 8 hours from 3/24/2025.   -Weekly CBC, CMP, ESR/CRP  - ID follow-up with Dr. Jai Woods for Telehealth. We will call the patient between 10:30-1:30      [address] [facility]       [phone]       Fax [phone]  -Will need repeat imaging of the head before stopping the abx. Repeat TTE in the outpatient setting.   -Off loading to prevent pressure sores and preventive measures to avoid aspiration. GOC    If any questions, please send a message or call on [email or web address] Teams  Please continue to update ID with any pertinent new laboratory or radiographic findings.    Mohan Benz M.D  Infectious Diseases Attending/   [employer] at [facility]

## 2025-03-28 NOTE — CHART NOTE - NSCHARTNOTEFT_GEN_A_CORE
Patient's wife would like to re-start Xarelto given history of clots in t he family     Patient has been off Xarelto for about 6 days  Was found to Have erythema on Left Arm   Doppler showing thrombosis in Basilic and axillary vein   Family was advised by Neurosurgery to hold AC for 4 weeks   Family understands the possibility of brain bleed along with herniation and death with starting anticoagulation

## 2025-03-28 NOTE — PROGRESS NOTE ADULT - TIME BILLING
On this date of service, level of risk to patient is considered: Moderate.     I have personally seen and examined this patient.    I have reviewed all pertinent clinical information and reviewed all relevant imaging and diagnostic studies personally.   I discussed recommendations with the primary team. I discussed recommendations with the primary team.

## 2025-03-28 NOTE — PROGRESS NOTE ADULT - ASSESSMENT
Mr Castorena is a 85 YOM w a PMH of alzheimer's dementia with hallucination (followed by Dr. Juan Neurologist), HFpEF, HTN, paroxysmal Afib on xarelto , Aortic Stenosis s/p AV replacement; CAD s/p CABG and hyperlipidemia presenting with worsening confusion        Left Arm erythema and swelling   VA doppler showing thrombosis in Basilic and axillary vein    Patient's wife would like to re-start Xarelto given history of clots in the family   Patient has been off Xarelto for about 6 days  Family was advised by Neurosurgery to hold AC for 4 weeks   Family understands the possibility of brain bleed along with herniation and death with starting anticoagulation.        Acute Cystis: ucx: pseudomonas aeruginosa   Bacteriemia: pseudomonas aeruginosa   Possible Endocarditis    SIRS present on admission >>progressed to sepsis   Likely Infectious from Acute cystitis and possibly some contribution from cerebral Hematoma/hygroma  Head CT: subacute to chronic subdural hematomas. No significant mass effect or midline shift.  Neurosurgery Recommended Transfer to [place] but Family declined understanding risk of brain herniation and death   Patient on Xarelto for AFIB: continue to hold for now AC for 4 weeks per Neurosurgery   Repeat Head CT: The density of the bilateral cerebral convexity and falcine subdural collections appears uniformly increased when compared to   the previous head CT probably reflecting IV contrast. Recommend short-term follow-up imaging.  2D-ECHO: EF 52%, Severe LVH, Bioprosthetic aortic valve with significant stenosis, Moderate prosthetic valve regurgitation.  Possible small, mobile vegetation on atrial-side of anterior leaflet. Cardiac CT without vegetations   PATIENT IS NOT A CANDIDATE FOR ALBERTO PER CARDIO, GIVEN BACTEREMIA  S/P PICC line placement >> IV plan for IV meropenem 2 gram q 8 hours from 3/24/2025.   Patient is expected to have intermittent fevers as source control cannot be obtained   -Will need repeat imaging of the head before stopping the abx. Repeat TTE in the outpatient setting.   Medically ready for discharge, pending Home IV abx set-up       Metabolic Encephalopathy>>still with intermittent lethargy  Advanced  Dementia   Most Likely infectious with some possibility of Subdural cerebral hematoma/hygroma  Neurosurgery consult appreciated>>Family refused transfer to [place]   **Recs to keep holding anticoagulation for at least 4 weeks with repeat CT head .O/P follow-up with Dr Supriya Amor   Neuro Endocrine: - Potential procedural planning for MMA embolization  will be delayed until the patient's infectious workup has been completed.    ID consult appreciated >>Continue IV antibiotics  Hold  Seroquel, continue daily re-orientation    Hypovolemic Hypernatremia  Continue IV hydration   Patient intermittently alert per feeding        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  AORTIC Stenosis s/p AV replacement   Paroxysmal AFIB + HTN + HLD   -BP now elevated, started on Norvasc and IV hydralazine for SBP greater than 160  -Continue Amiodarone , Statin, and DASH    Chronic BPH with acute urinary Retention   S/P Saenz catheter's insertion 3/25  continue Finasteride  and Flomax    DVT PPX: Xarelto on hold for Hematoma/hygroma for 4 weeks, SCD's for now   GI PPX: PPI   DNI/DNR   Dispo: From Home   Medically ready for discharge, pending Home IV abx set-up

## 2025-03-28 NOTE — PROGRESS NOTE ADULT - SUBJECTIVE AND OBJECTIVE BOX
Patient is a 85y old  Male who presents with a chief complaint of AMS (29 Mar 2025 15:11)      MEDICATIONS  (STANDING):  aMIOdarone    Tablet 100 milliGRAM(s) Oral <User Schedule>  amLODIPine   Tablet 10 milliGRAM(s) Oral every 24 hours  atorvastatin 10 milliGRAM(s) Oral at bedtime  chlorhexidine 2% Cloths 1 Application(s) Topical <User Schedule>  dextrose 5% + sodium chloride 0.45%. 1000 milliLiter(s) (50 mL/Hr) IV Continuous <Continuous>  finasteride 5 milliGRAM(s) Oral daily  meropenem  IVPB 2000 milliGRAM(s) IV Intermittent every 8 hours  pantoprazole    Tablet 40 milliGRAM(s) Oral before breakfast  QUEtiapine 12.5 milliGRAM(s) Oral at bedtime  rivaroxaban 20 milliGRAM(s) Oral with dinner  sertraline 50 milliGRAM(s) Oral daily  tamsulosin 0.4 milliGRAM(s) Oral at bedtime    MEDICATIONS  (PRN):  acetaminophen     Tablet .. 650 milliGRAM(s) Oral every 6 hours PRN Temp greater or equal to 38C (100.4F), Mild Pain (1 - 3)  acetaminophen  Suppository .. 650 milliGRAM(s) Rectal every 6 hours PRN Temp greater or equal to 38C (100.4F), Mild Pain (1 - 3)  hydrALAZINE 25 milliGRAM(s) Oral every 8 hours PRN Systolic blood pressure >160  hydrALAZINE Injectable 10 milliGRAM(s) IV Push every 8 hours PRN SBP greater than 170  ibuprofen  Tablet. 600 milliGRAM(s) Oral three times a day PRN Temp greater or equal to 38C (100.4F)  ondansetron Injectable 4 milliGRAM(s) IV Push every 6 hours PRN Nausea  senna 2 Tablet(s) Oral at bedtime PRN Constipation      CAPILLARY BLOOD GLUCOSE      I&O's Summary    28 Mar 2025 07:01  -  29 Mar 2025 07:00  --------------------------------------------------------  IN: 890 mL / OUT: 2600 mL / NET: -1710 mL        PHYSICAL EXAM:  Vital Signs Last 24 Hrs  T(C): 37.8 (29 Mar 2025 14:20), Max: 38.6 (28 Mar 2025 18:18)  T(F): 100 (29 Mar 2025 14:20), Max: 101.5 (28 Mar 2025 20:48)  HR: 94 (29 Mar 2025 14:20) (53 - 94)  BP: 152/60 (29 Mar 2025 14:20) (141/53 - 156/56)  BP(mean): --  RR: 18 (29 Mar 2025 05:01) (16 - 18)  SpO2: 95% (29 Mar 2025 14:20) (95% - 98%)    Parameters below as of 29 Mar 2025 14:20  Patient On (Oxygen Delivery Method): room air        GENERAL: No acute distress,  intermittent lethargy  HEAD:  Atraumatic, Normocephalic  EYES: closes eyes  NECK: Supple, no JVD  CHEST/LUNG: CTAB; No wheezes, rales, or rhonchi  HEART: Regular rate and rhythm; No murmurs  ABDOMEN: Soft, non-tender, non-distended;  EXTREMITIES: Left forearm erythema and swelling  NEUROLOGY: A&O x 0, no focal deficits,  SKIN: No rashes or lesions  LABS:                        9.4    7.69  )-----------( 356      ( 29 Mar 2025 06:47 )             27.9     03-29    145  |  111[H]  |  24[H]  ----------------------------<  97  4.2   |  25  |  0.8    Ca    7.9[L]      29 Mar 2025 06:47    TPro  5.4[L]  /  Alb  2.6[L]  /  TBili  0.3  /  DBili  x   /  AST  27  /  ALT  22  /  AlkPhos  109  03-29          Urinalysis Basic - ( 29 Mar 2025 06:47 )    Color: x / Appearance: x / SG: x / pH: x  Gluc: 97 mg/dL / Ketone: x  / Bili: x / Urobili: x   Blood: x / Protein: x / Nitrite: x   Leuk Esterase: x / RBC: x / WBC x   Sq Epi: x / Non Sq Epi: x / Bacteria: x        Culture - Blood (collected 27 Mar 2025 06:42)  Source: Blood None  Preliminary Report (28 Mar 2025 17:01):    No growth at 24 hours

## 2025-03-28 NOTE — PROGRESS NOTE ADULT - ASSESSMENT
1. SDH on DOAC   agree with holding off doAC   holding off drainage with close monitor for expansion of hematoma  >> new upper ext DVT and eliquis resumed     2.  late onset alzheimer's dementia with hallucination    3 paroxysmal Afib on xarelto ..on hold      4  Aortic Stenosis s/p AV replacement; CAD s/p CABG, CHF,  HTN and hyperlipidema presenting with AMS    5. sepsis with bacteremia gNR   Zosyn 3.375 gram q 8 hours. (S/D of abx 3/19)   long term antibx>> s/p PICC line     will follow

## 2025-03-28 NOTE — PROGRESS NOTE ADULT - SUBJECTIVE AND OBJECTIVE BOX
HPI:  85 y/o man with PMH of late onset alzheimer's dementia with hallucination (followed by Dr. Juan Neurologist), paroxysmal Afib on xarelto ,   Aortic Stenosis s/p AV replacement; CAD s/p CABG, CHF,  HTN and hyperlipidema presenting with AMS that has been going on for the past 3 days.  fever noticed in the ed.  Patient is lethargic.  unable to obtain ros/hx  as per wife, patient might has fell several months ago  In course of hospitalization, pt found to have SDH while on DOAC.  Currently being on hold.   Patient declined transfer to [place] for neurosurgery evaluation with full understanding of risks, benefits, and alteratives  discussed case with neuro surgery who recommended to hold off the anticoagulation, and head CT scan in 6 hrs      (19 Mar 2025 23:59)        REVIEW OF SYSTEMS:  CONSTITUTIONAL: No fever or chills  HEENT: No sore throat  RESPIRATORY: No cough, no shortness of breath  CARDIOVASCULAR: No chest pain or palpitations  GASTROINTESTINAL: No abdominal or epigastric pain  GENITOURINARY: No dysuria  NEUROLOGICAL: No headache/dizziness  MSK: No joint pain, erythema, or swelling; no back pain  SKIN: No itching, rashes  All other ROS negative except noted above    PAST MEDICAL & SURGICAL HISTORY:  Afib      CHF (congestive heart failure)      Hypertension      S/P CABG (coronary artery bypass graft)    SOCIAL HISTORY: cannot obtain further history from the patient secondary to altered mental status or sedation      FAMILY HISTORY: No pertinent PMH for first degree relatives.     ANTIMICROBIALS:      ANTIMICROBIALS (past 90 days):  MEDICATIONS  (STANDING):    cefepime   IVPB   100 mL/Hr IV Intermittent (03-19-25 @ 19:00)    cefepime   IVPB   100 mL/Hr IV Intermittent (03-20-25 @ 06:01)    vancomycin  IVPB   250 mL/Hr IV Intermittent (03-20-25 @ 00:43)        OTHER MEDS:   MEDICATIONS  (STANDING):  acetaminophen     Tablet .. 650 every 6 hours PRN  acetaminophen   IVPB .. 1000 once  aMIOdarone    Tablet 100 <User Schedule>  atorvastatin 10 at bedtime  finasteride 5 daily  ondansetron Injectable 4 every 6 hours PRN  pantoprazole    Tablet 40 before breakfast  QUEtiapine 12.5 at bedtime  senna 2 at bedtime PRN  sertraline 50 daily  tamsulosin 0.4 at bedtime      VITALS:  Vital Signs Last 24 Hrs  T(F): 98.7 (03-20-25 @ 14:25), Max: 103.8 (03-19-25 @ 23:57)    Vital Signs Last 24 Hrs  HR: 63 (03-20-25 @ 14:25) (63 - 80)  BP: 138/59 (03-20-25 @ 14:25) (127/67 - 170/72)  RR: 20 (03-20-25 @ 14:25)  SpO2: 96% (03-20-25 @ 14:25) (94% - 97%)  Wt(kg): --    EXAM:  GENERAL: Somnolent. Grimacing with sternal rub.   HEAD: No head lesions  NECK: Supple  CHEST/LUNG: Shallow breath sounds.   HEART: S1 S2  ABDOMEN: Soft, nontender.  EXTREMITIES: No clubbing  NERVOUS SYSTEM: Somnolent.   MSK: No joint erythema, swelling or pain  SKIN: No rashes or lesions, no superficial thrombophlebitis    Labs:                        10.8   12.94 )-----------( 212      ( 19 Mar 2025 18:30 )             31.5   >> hb 8     03-19    143  |  106  |  29[H]  ----------------------------<  117[H]  4.4   |  27  |  1.1    Ca    8.3[L]      19 Mar 2025 18:30    TPro  6.0  /  Alb  3.2[L]  /  TBili  0.5  /  DBili  x   /  AST  28  /  ALT  31  /  AlkPhos  121[H]  03-19      WBC Trend:  WBC Count: 12.94 (03-19-25 @ 18:30)      Auto Neutrophil #: 3.96 K/uL (06-30-24 @ 07:21)  Auto Neutrophil #: 6.58 K/uL (06-28-24 @ 07:09)  Auto Neutrophil #: 6.55 K/uL (06-27-24 @ 06:53)  Auto Neutrophil #: 3.06 K/uL (06-25-24 @ 08:12)  Auto Neutrophil #: 3.41 K/uL (06-24-24 @ 18:40)            Trend LDH                INTERPRETATION:  CLINICAL INFORMATION: Fever, abdominal pain    COMPARISON: CT ABDOMEN/PELVIS 3/19/2025.    CONTRAST/COMPLICATIONS:  IV Contrast: Omnipaque 350  90 cc administered   10 cc discarded  Oral Contrast: NONE    PROCEDURE:  CT of the Abdomen and Pelvis was performed.  Sagittal and coronal reformats were performed.    FINDINGS:  LOWER CHEST: Bibasilar atelectasis. Poststernotomy. Cardiomegaly    LIVER: Within normal limits.  BILE DUCTS: Normal caliber.  GALLBLADDER: Cholelithiasis.  SPLEEN: Within normal limits.  PANCREAS: Within normal limits.  ADRENALS: Within normal limits.  KIDNEYS/URETERS: No renal stones or hydronephrosis.    BLADDER: Mild urinary bladder wall thickening  REPRODUCTIVE ORGANS: Unremarkable at CT.    BOWEL: Stool impaction the rectum measuring up to 9 cm. Mild degree of   perirectal fat stranding. Additional desiccated stool within the colon.  PERITONEUM/RETROPERITONEUM: Within normal limits.  VESSELS: Atherosclerotic changes with extensive visceral vascular   calcifications. Circumaortic left renal vein  LYMPH NODES: No lymphadenopathy.  ABDOMINAL WALL: Small fat-containing umbilical hernia  BONES: Osteopenia. Subacute/chronic left L2 transverse process deformity    IMPRESSION:  Stool impaction the rectum measuring up to 9 cm with mild to moderate   degree of perirectal fat stranding    --- End of Report ---            SCARLET SERVIN MD; Attending Radiologist  This document has been electronically signed. Mar 19 2025  8:22PM (03-19-25 @ 20:01)    < from: CT Head No Cont (03.20.25 @ 00:28) >  IMPRESSION:    Increased attenuation of the dural reflections and intracranial vessels   likely reflecting residual contrast from recent intravenous contrast   administration.    Redemonstration of bilateral hemispheric subdural collections likely   reflecting subacute to chronic subdural hematomas, grossly stable since   prior. No mass effect or midline shift.    ATTENDING ADDENDUM: The density of the bilateral cerebral convexity and   falcine subdural collections appears uniformly increased when compared to   the previous head CT probably reflecting IV contrast. Recommend   short-term follow-up imaging.    --- End of Report ---    < end of copied text >  < from: CT Head No Cont (03.19.25 @ 19:53) >  Motion limited examination.    Predominantly low density bilateral subdural collections are noted likely   reflecting subacute to chronic subdural hematomas. No significant mass   effect or midline shift.    < end of copied text >  < from: CT Chest w/ IV Cont (06.24.24 @ 20:14) >  IMPRESSION:    Cholelithiasis.    No evidence of thoracic, abdominal or pelvic visceral injury, laceration,   or hematoma.    No evidence of acute fracture.    --- End of Report ---    < end of copied text >

## 2025-03-28 NOTE — CHART NOTE - NSCHARTNOTESELECT_GEN_ALL_CORE
Event Note
Neuroendovascular/Event Note
Neurosurgery/Event Note
Nutrition Services

## 2025-03-29 LAB
ALBUMIN SERPL ELPH-MCNC: 2.6 G/DL — LOW (ref 3.5–5.2)
ALP SERPL-CCNC: 109 U/L — SIGNIFICANT CHANGE UP (ref 30–115)
ALT FLD-CCNC: 22 U/L — SIGNIFICANT CHANGE UP (ref 0–41)
ANION GAP SERPL CALC-SCNC: 9 MMOL/L — SIGNIFICANT CHANGE UP (ref 7–14)
AST SERPL-CCNC: 27 U/L — SIGNIFICANT CHANGE UP (ref 0–41)
BASOPHILS # BLD AUTO: 0.05 K/UL — SIGNIFICANT CHANGE UP (ref 0–0.2)
BASOPHILS NFR BLD AUTO: 0.7 % — SIGNIFICANT CHANGE UP (ref 0–1)
BILIRUB SERPL-MCNC: 0.3 MG/DL — SIGNIFICANT CHANGE UP (ref 0.2–1.2)
BUN SERPL-MCNC: 24 MG/DL — HIGH (ref 10–20)
CALCIUM SERPL-MCNC: 7.9 MG/DL — LOW (ref 8.4–10.5)
CHLORIDE SERPL-SCNC: 111 MMOL/L — HIGH (ref 98–110)
CO2 SERPL-SCNC: 25 MMOL/L — SIGNIFICANT CHANGE UP (ref 17–32)
CREAT SERPL-MCNC: 0.8 MG/DL — SIGNIFICANT CHANGE UP (ref 0.7–1.5)
CRP SERPL-MCNC: 64 MG/L — HIGH
CULTURE RESULTS: SIGNIFICANT CHANGE UP
EGFR: 87 ML/MIN/1.73M2 — SIGNIFICANT CHANGE UP
EGFR: 87 ML/MIN/1.73M2 — SIGNIFICANT CHANGE UP
EOSINOPHIL # BLD AUTO: 0.44 K/UL — SIGNIFICANT CHANGE UP (ref 0–0.7)
EOSINOPHIL NFR BLD AUTO: 5.7 % — SIGNIFICANT CHANGE UP (ref 0–8)
ERYTHROCYTE [SEDIMENTATION RATE] IN BLOOD: 55 MM/HR — HIGH (ref 0–10)
GLUCOSE SERPL-MCNC: 97 MG/DL — SIGNIFICANT CHANGE UP (ref 70–99)
HCT VFR BLD CALC: 27.9 % — LOW (ref 42–52)
HGB BLD-MCNC: 9.4 G/DL — LOW (ref 14–18)
IMM GRANULOCYTES NFR BLD AUTO: 3.6 % — HIGH (ref 0.1–0.3)
LYMPHOCYTES # BLD AUTO: 1.15 K/UL — LOW (ref 1.2–3.4)
LYMPHOCYTES # BLD AUTO: 15 % — LOW (ref 20.5–51.1)
MCHC RBC-ENTMCNC: 30.5 PG — SIGNIFICANT CHANGE UP (ref 27–31)
MCHC RBC-ENTMCNC: 33.7 G/DL — SIGNIFICANT CHANGE UP (ref 32–37)
MCV RBC AUTO: 90.6 FL — SIGNIFICANT CHANGE UP (ref 80–94)
MONOCYTES # BLD AUTO: 0.97 K/UL — HIGH (ref 0.1–0.6)
MONOCYTES NFR BLD AUTO: 12.6 % — HIGH (ref 1.7–9.3)
NEUTROPHILS # BLD AUTO: 4.8 K/UL — SIGNIFICANT CHANGE UP (ref 1.4–6.5)
NEUTROPHILS NFR BLD AUTO: 62.4 % — SIGNIFICANT CHANGE UP (ref 42.2–75.2)
NRBC BLD AUTO-RTO: 0 /100 WBCS — SIGNIFICANT CHANGE UP (ref 0–0)
PLATELET # BLD AUTO: 356 K/UL — SIGNIFICANT CHANGE UP (ref 130–400)
PMV BLD: 10.9 FL — HIGH (ref 7.4–10.4)
POTASSIUM SERPL-MCNC: 4.2 MMOL/L — SIGNIFICANT CHANGE UP (ref 3.5–5)
POTASSIUM SERPL-SCNC: 4.2 MMOL/L — SIGNIFICANT CHANGE UP (ref 3.5–5)
PROT SERPL-MCNC: 5.4 G/DL — LOW (ref 6–8)
RBC # BLD: 3.08 M/UL — LOW (ref 4.7–6.1)
RBC # FLD: 16.8 % — HIGH (ref 11.5–14.5)
SODIUM SERPL-SCNC: 145 MMOL/L — SIGNIFICANT CHANGE UP (ref 135–146)
SPECIMEN SOURCE: SIGNIFICANT CHANGE UP
WBC # BLD: 7.69 K/UL — SIGNIFICANT CHANGE UP (ref 4.8–10.8)
WBC # FLD AUTO: 7.69 K/UL — SIGNIFICANT CHANGE UP (ref 4.8–10.8)

## 2025-03-29 PROCEDURE — 99233 SBSQ HOSP IP/OBS HIGH 50: CPT

## 2025-03-29 RX ORDER — MEROPENEM 1 G/30ML
2000 INJECTION INTRAVENOUS ONCE
Refills: 0 | Status: COMPLETED | OUTPATIENT
Start: 2025-03-29 | End: 2025-03-29

## 2025-03-29 RX ADMIN — Medication 600 MILLIGRAM(S): at 22:20

## 2025-03-29 RX ADMIN — ATORVASTATIN CALCIUM 10 MILLIGRAM(S): 80 TABLET, FILM COATED ORAL at 21:32

## 2025-03-29 RX ADMIN — RIVAROXABAN 20 MILLIGRAM(S): 10 TABLET, FILM COATED ORAL at 18:04

## 2025-03-29 RX ADMIN — Medication 650 MILLIGRAM(S): at 18:11

## 2025-03-29 RX ADMIN — SODIUM CHLORIDE 50 MILLILITER(S): 9 INJECTION, SOLUTION INTRAVENOUS at 21:33

## 2025-03-29 RX ADMIN — TAMSULOSIN HYDROCHLORIDE 0.4 MILLIGRAM(S): 0.4 CAPSULE ORAL at 21:31

## 2025-03-29 RX ADMIN — MEROPENEM 280 MILLIGRAM(S): 1 INJECTION INTRAVENOUS at 18:41

## 2025-03-29 RX ADMIN — Medication 600 MILLIGRAM(S): at 01:50

## 2025-03-29 RX ADMIN — FINASTERIDE 5 MILLIGRAM(S): 1 TABLET, FILM COATED ORAL at 16:41

## 2025-03-29 RX ADMIN — MEROPENEM 280 MILLIGRAM(S): 1 INJECTION INTRAVENOUS at 05:03

## 2025-03-29 RX ADMIN — QUETIAPINE FUMARATE 12.5 MILLIGRAM(S): 25 TABLET ORAL at 21:32

## 2025-03-29 RX ADMIN — MEROPENEM 280 MILLIGRAM(S): 1 INJECTION INTRAVENOUS at 21:33

## 2025-03-29 RX ADMIN — Medication 600 MILLIGRAM(S): at 01:21

## 2025-03-29 RX ADMIN — SERTRALINE 50 MILLIGRAM(S): 100 TABLET, FILM COATED ORAL at 16:43

## 2025-03-29 RX ADMIN — Medication 1 APPLICATION(S): at 05:03

## 2025-03-29 RX ADMIN — Medication 650 MILLIGRAM(S): at 18:04

## 2025-03-29 NOTE — PROGRESS NOTE ADULT - SUBJECTIVE AND OBJECTIVE BOX
Patient is a 85y old  Male who presents with a chief complaint of AMS (28 Mar 2025 17:50)      MEDICATIONS  (STANDING):  aMIOdarone    Tablet 100 milliGRAM(s) Oral <User Schedule>  amLODIPine   Tablet 10 milliGRAM(s) Oral every 24 hours  atorvastatin 10 milliGRAM(s) Oral at bedtime  chlorhexidine 2% Cloths 1 Application(s) Topical <User Schedule>  dextrose 5% + sodium chloride 0.45%. 1000 milliLiter(s) (50 mL/Hr) IV Continuous <Continuous>  finasteride 5 milliGRAM(s) Oral daily  meropenem  IVPB 2000 milliGRAM(s) IV Intermittent every 8 hours  pantoprazole    Tablet 40 milliGRAM(s) Oral before breakfast  QUEtiapine 12.5 milliGRAM(s) Oral at bedtime  rivaroxaban 20 milliGRAM(s) Oral with dinner  sertraline 50 milliGRAM(s) Oral daily  tamsulosin 0.4 milliGRAM(s) Oral at bedtime    MEDICATIONS  (PRN):  acetaminophen     Tablet .. 650 milliGRAM(s) Oral every 6 hours PRN Temp greater or equal to 38C (100.4F), Mild Pain (1 - 3)  acetaminophen  Suppository .. 650 milliGRAM(s) Rectal every 6 hours PRN Temp greater or equal to 38C (100.4F), Mild Pain (1 - 3)  hydrALAZINE 25 milliGRAM(s) Oral every 8 hours PRN Systolic blood pressure >160  hydrALAZINE Injectable 10 milliGRAM(s) IV Push every 8 hours PRN SBP greater than 170  ibuprofen  Tablet. 600 milliGRAM(s) Oral three times a day PRN Temp greater or equal to 38C (100.4F)  ondansetron Injectable 4 milliGRAM(s) IV Push every 6 hours PRN Nausea  senna 2 Tablet(s) Oral at bedtime PRN Constipation      CAPILLARY BLOOD GLUCOSE        I&O's Summary    28 Mar 2025 07:01  -  29 Mar 2025 07:00  --------------------------------------------------------  IN: 890 mL / OUT: 2600 mL / NET: -1710 mL        PHYSICAL EXAM:  Vital Signs Last 24 Hrs  T(C): 37.8 (29 Mar 2025 14:20), Max: 38.6 (28 Mar 2025 18:18)  T(F): 100 (29 Mar 2025 14:20), Max: 101.5 (28 Mar 2025 20:48)  HR: 94 (29 Mar 2025 14:20) (53 - 94)  BP: 152/60 (29 Mar 2025 14:20) (141/53 - 156/56)  BP(mean): --  RR: 18 (29 Mar 2025 05:01) (16 - 18)  SpO2: 95% (29 Mar 2025 14:20) (95% - 98%)    Parameters below as of 29 Mar 2025 14:20  Patient On (Oxygen Delivery Method): room air      GENERAL: No acute distress,  intermittent lethargy  HEAD:  Atraumatic, Normocephalic  EYES: closes eyes  NECK: Supple, no JVD  CHEST/LUNG: CTAB; No wheezes, rales, or rhonchi  HEART: Regular rate and rhythm; No murmurs  ABDOMEN: Soft, non-tender, non-distended;  EXTREMITIES: No clubbing, cyanosis, or edema  NEUROLOGY: A&O x 0, no focal deficits,  SKIN: No rashes or lesions    LABS:                        9.4    7.69  )-----------( 356      ( 29 Mar 2025 06:47 )             27.9     03-29    145  |  111[H]  |  24[H]  ----------------------------<  97  4.2   |  25  |  0.8    Ca    7.9[L]      29 Mar 2025 06:47    TPro  5.4[L]  /  Alb  2.6[L]  /  TBili  0.3  /  DBili  x   /  AST  27  /  ALT  22  /  AlkPhos  109  03-29      Urinalysis Basic - ( 29 Mar 2025 06:47 )    Color: x / Appearance: x / SG: x / pH: x  Gluc: 97 mg/dL / Ketone: x  / Bili: x / Urobili: x   Blood: x / Protein: x / Nitrite: x   Leuk Esterase: x / RBC: x / WBC x   Sq Epi: x / Non Sq Epi: x / Bacteria: x        Culture - Blood (collected 27 Mar 2025 06:42)  Source: Blood None  Preliminary Report (28 Mar 2025 17:01):    No growth at 24 hours

## 2025-03-29 NOTE — PROGRESS NOTE ADULT - ASSESSMENT
Mr Kell is a 85 YOM w a PMH of alzheimer's dementia with hallucination (followed by Dr. Juan Neurologist), HFpEF, HTN, paroxysmal Afib on xarelto , Aortic Stenosis s/p AV replacement; CAD s/p CABG and hyperlipidemia presenting with worsening confusion        Acute Cystis: ucx: pseudomonas aeruginosa   Bacteriemia: pseudomonas aeruginosa   Possible Endocarditis    SIRS present on admission >>progressed to sepsis   Likely Infectious from Acute cystitis and possibly some contribution from cerebral Hematoma/hygroma  Head CT: subacute to chronic subdural hematomas. No significant mass effect or midline shift.  Neurosurgery Recommended Transfer to [place] but Family declined understanding risk of brain herniation and death   Patient on Xarelto for AFIB: continue to hold for now AC for 4 weeks per Neurosurgery   Repeat Head CT: The density of the bilateral cerebral convexity and falcine subdural collections appears uniformly increased when compared to   the previous head CT probably reflecting IV contrast. Recommend short-term follow-up imaging.  2D-ECHO: EF 52%, Severe LVH, Bioprosthetic aortic valve with significant stenosis, Moderate prosthetic valve regurgitation.  Possible small, mobile vegetation on atrial-side of anterior leaflet. Cardiac CT without vegetations   PATIENT IS NOT A CANDIDATE FOR ALBERTO PER CARDIO, GIVEN BACTEREMIA  S/P PICC line placement >> IV plan for IV meropenem 2 gram q 8 hours from 3/24/2025.   Patient is expected to have intermittent fevers as source control cannot be obtained   -Will need repeat imaging of the head before stopping the abx. Repeat TTE in the outpatient setting.   Medically ready for discharge, pending Home IV abx set-up       Metabolic Encephalopathy>>still with intermittent lethargy  Advanced  Dementia   Most Likely infectious with some possibility of Subdural cerebral hematoma/hygroma  Neurosurgery consult appreciated>>Family refused transfer to [place]   **Recs to keep holding anticoagulation for at least 4 weeks with repeat CT head .O/P follow-up with Dr Supriya Amor   Neuro Endocrine: - Potential procedural planning for MMA embolization  will be delayed until the patient's infectious workup has been completed.    ID consult appreciated >>Continue IV antibiotics  Hold  Seroquel, continue daily re-orientation    Hypovolemic Hypernatremia  Continue IV hydration   Patient intermittently alert per feeding        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  AORTIC Stenosis s/p AV replacement   Paroxysmal AFIB + HTN + HLD   -BP now elevated, started on Norvasc and IV hydralazine for SBP greater than 160  -Continue Amiodarone , Statin, and DASH    Chronic BPH with acute urinary Retention   S/P Saenz catheter's insertion 3/25  continue Finasteride  and Flomax    DVT PPX: Xarelto on hold for Hematoma/hygroma for 4 weeks, SCD's for now   GI PPX: PPI   DNI/DNR   Dispo: From Home   Medically ready for discharge, pending Home IV abx set-up

## 2025-03-30 PROCEDURE — 99233 SBSQ HOSP IP/OBS HIGH 50: CPT

## 2025-03-30 RX ORDER — MEROPENEM 1 G/30ML
2000 INJECTION INTRAVENOUS EVERY 8 HOURS
Refills: 0 | Status: DISCONTINUED | OUTPATIENT
Start: 2025-03-30 | End: 2025-03-31

## 2025-03-30 RX ADMIN — RIVAROXABAN 20 MILLIGRAM(S): 10 TABLET, FILM COATED ORAL at 17:31

## 2025-03-30 RX ADMIN — FINASTERIDE 5 MILLIGRAM(S): 1 TABLET, FILM COATED ORAL at 10:23

## 2025-03-30 RX ADMIN — TAMSULOSIN HYDROCHLORIDE 0.4 MILLIGRAM(S): 0.4 CAPSULE ORAL at 21:55

## 2025-03-30 RX ADMIN — Medication 1 APPLICATION(S): at 05:19

## 2025-03-30 RX ADMIN — MEROPENEM 280 MILLIGRAM(S): 1 INJECTION INTRAVENOUS at 05:19

## 2025-03-30 RX ADMIN — ATORVASTATIN CALCIUM 10 MILLIGRAM(S): 80 TABLET, FILM COATED ORAL at 21:55

## 2025-03-30 RX ADMIN — AMIODARONE HYDROCHLORIDE 100 MILLIGRAM(S): 50 INJECTION, SOLUTION INTRAVENOUS at 10:29

## 2025-03-30 RX ADMIN — AMLODIPINE BESYLATE 10 MILLIGRAM(S): 10 TABLET ORAL at 05:19

## 2025-03-30 RX ADMIN — MEROPENEM 280 MILLIGRAM(S): 1 INJECTION INTRAVENOUS at 21:59

## 2025-03-30 RX ADMIN — Medication 40 MILLIGRAM(S): at 05:19

## 2025-03-30 RX ADMIN — SERTRALINE 50 MILLIGRAM(S): 100 TABLET, FILM COATED ORAL at 10:24

## 2025-03-30 RX ADMIN — MEROPENEM 280 MILLIGRAM(S): 1 INJECTION INTRAVENOUS at 16:03

## 2025-03-30 RX ADMIN — Medication 25 MILLIGRAM(S): at 06:46

## 2025-03-30 RX ADMIN — QUETIAPINE FUMARATE 12.5 MILLIGRAM(S): 25 TABLET ORAL at 21:56

## 2025-03-30 RX ADMIN — SODIUM CHLORIDE 50 MILLILITER(S): 9 INJECTION, SOLUTION INTRAVENOUS at 10:22

## 2025-03-30 NOTE — PHYSICAL THERAPY INITIAL EVALUATION ADULT - FOLLOWS COMMANDS/ANSWERS QUESTIONS, REHAB EVAL
N/A Patient is under age 18 and does not have a history of high risk behavior or is not high risk for Hep C
50% of the time
poorly following commands/25% of the time/able to follow single-step instructions

## 2025-03-30 NOTE — PHYSICAL THERAPY INITIAL EVALUATION ADULT - IMPAIRED TRANSFERS: SIT/STAND, REHAB EVAL
ataxic/impaired balance/cognition/impaired coordination/decreased flexibility/impaired motor control/narrow base of support/impaired postural control/decreased strength

## 2025-03-30 NOTE — PHYSICAL THERAPY INITIAL EVALUATION ADULT - BALANCE DISTURBANCE, IDENTIFIED IMPAIRMENT CONTRIBUTE, REHAB EVAL
impaired coordination/impaired postural control/decreased strength
impaired coordination/impaired motor control/abnormal muscle tone/impaired postural control/decreased strength

## 2025-03-30 NOTE — PHYSICAL THERAPY INITIAL EVALUATION ADULT - GENERAL OBSERVATIONS, REHAB EVAL
14:08-14:35 Chart reviewed. Patient available to be seen for physical therapy, denies pain, confirmed with RN.  Pt rec'd in bed wife at bedside, pt in NAD.
10;40-11;05 pt was seen for PT IE at bed side, pt is agreeable, chart thoroughly reviewed, RN Adán is aware.  Pt received and left semi figueredo in bed, in no apparent distress, +hep lock, +sequentials B LE, condom cath, +call bell within reach, bed side table at reach,

## 2025-03-30 NOTE — PHYSICAL THERAPY INITIAL EVALUATION ADULT - ADDITIONAL COMMENTS
pt is confused and is a poor historian and info has been obtained from chart revies: pt lives with 24/6 HHA, using WC and RW for transfer and amb
Pt's wife reports he lives with spouse in house with few steps to enter then is set up on 1st floor. Pt has HHA few hours a day, and requires assist with all mobility and ADL's.

## 2025-03-30 NOTE — PROGRESS NOTE ADULT - ASSESSMENT
Mr Kell is a 85 YOM w a PMH of alzheimer's dementia with hallucination (followed by Dr. Juan Neurologist), HFpEF, HTN, paroxysmal Afib on xarelto , Aortic Stenosis s/p AV replacement; CAD s/p CABG and hyperlipidemia presenting with worsening confusion        Acute Cystis: ucx: pseudomonas aeruginosa   Bacteriemia: pseudomonas aeruginosa   Possible Endocarditis    SIRS present on admission >>progressed to sepsis   Likely Infectious from Acute cystitis and possibly some contribution from cerebral Hematoma/hygroma  Head CT: subacute to chronic subdural hematomas. No significant mass effect or midline shift.  Neurosurgery Recommended Transfer to [place] but Family declined understanding risk of brain herniation and death   Patient on Xarelto for AFIB: continue to hold for now AC for 4 weeks per Neurosurgery   Repeat Head CT: The density of the bilateral cerebral convexity and falcine subdural collections appears uniformly increased when compared to   the previous head CT probably reflecting IV contrast. Recommend short-term follow-up imaging.  2D-ECHO: EF 52%, Severe LVH, Bioprosthetic aortic valve with significant stenosis, Moderate prosthetic valve regurgitation.  Possible small, mobile vegetation on atrial-side of anterior leaflet. Cardiac CT without vegetations   PATIENT IS NOT A CANDIDATE FOR ALBERTO PER CARDIO, GIVEN BACTEREMIA  S/P PICC line placement >> IV plan for IV meropenem 2 gram q 8 hours from 3/24/2025.   Patient is expected to have intermittent fevers as source control cannot be obtained   -Will need repeat imaging of the head before stopping the abx. Repeat TTE in the outpatient setting.   Medically ready for discharge, pending Home IV abx set-up       Metabolic Encephalopathy>>still with intermittent lethargy  Advanced  Dementia   Most Likely infectious with some possibility of Subdural cerebral hematoma/hygroma  Neurosurgery consult appreciated>>Family refused transfer to [place]   **Recs to keep holding anticoagulation for at least 4 weeks with repeat CT head .O/P follow-up with Dr Supriya Amor   Neuro Endocrine: - Potential procedural planning for MMA embolization  will be delayed until the patient's infectious workup has been completed.    ID consult appreciated >>Continue IV antibiotics  Hold  Seroquel, continue daily re-orientation    Hypovolemic Hypernatremia  Continue IV hydration   Patient intermittently alert per feeding        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  AORTIC Stenosis s/p AV replacement   Paroxysmal AFIB + HTN + HLD   -BP now elevated, started on Norvasc and IV hydralazine for SBP greater than 160  -Continue Amiodarone , Statin, and DASH    Chronic BPH with acute urinary Retention   S/P Saenz catheter's insertion 3/25  continue Finasteride  and Flomax    DVT PPX: Xarelto on hold for Hematoma/hygroma for 4 weeks, SCD's for now   GI PPX: PPI   DNI/DNR   Dispo: From Home   Medically ready for discharge, pending Home IV abx set-up

## 2025-03-30 NOTE — PHYSICAL THERAPY INITIAL EVALUATION ADULT - IMPAIRMENTS CONTRIBUTING IMPAIRED BED MOBILITY, REHAB EVAL
cognition/impaired coordination/decreased strength
impaired balance/cognition/impaired coordination/impaired postural control/decreased strength

## 2025-03-30 NOTE — PHYSICAL THERAPY INITIAL EVALUATION ADULT - CRITERIA FOR SKILLED THERAPEUTIC INTERVENTIONS
impairments found/functional limitations in following categories/risk reduction/prevention/rehab potential/therapy frequency/predicted duration of therapy intervention/anticipated equipment needs at discharge/anticipated discharge recommendation
impairments found/functional limitations in following categories/rehab potential/therapy frequency/predicted duration of therapy intervention/anticipated equipment needs at discharge/anticipated discharge recommendation

## 2025-03-30 NOTE — PHYSICAL THERAPY INITIAL EVALUATION ADULT - IMPAIRMENTS FOUND, PT EVAL
aerobic capacity/endurance/gait, locomotion, and balance/muscle strength
arousal, attention, and cognition/gait, locomotion, and balance/muscle strength

## 2025-03-30 NOTE — PHYSICAL THERAPY INITIAL EVALUATION ADULT - STANDING BALANCE: STATIC
DO NOT CALL [facility] ON CALL FOR POSTOPERATIVE PROBLEMS. CALL THE  [phone] AND ASK FOR ENT RESIDENT ON CALL.    
unable to assess
Max A x2/poor balance

## 2025-03-30 NOTE — PHYSICAL THERAPY INITIAL EVALUATION ADULT - LEVEL OF INDEPENDENCE: GAIT, REHAB EVAL
maximum assist (25% patients effort)
2/2 inability to assume safe standing with RW/unable to perform

## 2025-03-30 NOTE — PHYSICAL THERAPY INITIAL EVALUATION ADULT - PERTINENT HX OF CURRENT PROBLEM, REHAB EVAL
85 y/o man with PMH of late onset alzheimer's dementia with hallucination (followed by Dr. Juan Neurologist), paroxysmal Afib on xarelto , Aortic Stenosis s/p AV replacement; CAD s/p CABG, CHF,  HTN and hyperlipidema presenting with AMS that has been going on for the past 3 days.  fever noticed in the ed.  Patient is lethargic.  unable to obtain ros/hx  as per wife, patient might has fell several months ago
History of Present Illness:   83 y/o man with PMH of late onset alzheimer's dementia with hallucination (followed by Dr. Juan Neurologist), paroxysmal Afib on xarelto , Aortic Stenosis s/p AV replacement; CAD s/p CABG, CHF,  HTN and hyperlipidema presenting with AMS that has been going on for the past 3 days.  fever noticed in the ed.  Patient is lethargic.  unable to obtain ros/hx  as per wife, patient might has fell several months ago

## 2025-03-30 NOTE — PROGRESS NOTE ADULT - SUBJECTIVE AND OBJECTIVE BOX
Patient is a 85y old  Male who presents with a chief complaint of AMS (29 Mar 2025 15:11)        MEDICATIONS  (STANDING):  aMIOdarone    Tablet 100 milliGRAM(s) Oral <User Schedule>  amLODIPine   Tablet 10 milliGRAM(s) Oral every 24 hours  atorvastatin 10 milliGRAM(s) Oral at bedtime  chlorhexidine 2% Cloths 1 Application(s) Topical <User Schedule>  dextrose 5% + sodium chloride 0.45%. 1000 milliLiter(s) (50 mL/Hr) IV Continuous <Continuous>  finasteride 5 milliGRAM(s) Oral daily  pantoprazole    Tablet 40 milliGRAM(s) Oral before breakfast  QUEtiapine 12.5 milliGRAM(s) Oral at bedtime  rivaroxaban 20 milliGRAM(s) Oral with dinner  sertraline 50 milliGRAM(s) Oral daily  tamsulosin 0.4 milliGRAM(s) Oral at bedtime    MEDICATIONS  (PRN):  acetaminophen     Tablet .. 650 milliGRAM(s) Oral every 6 hours PRN Temp greater or equal to 38C (100.4F), Mild Pain (1 - 3)  acetaminophen  Suppository .. 650 milliGRAM(s) Rectal every 6 hours PRN Temp greater or equal to 38C (100.4F), Mild Pain (1 - 3)  hydrALAZINE 25 milliGRAM(s) Oral every 8 hours PRN Systolic blood pressure >160  hydrALAZINE Injectable 10 milliGRAM(s) IV Push every 8 hours PRN SBP greater than 170  ibuprofen  Tablet. 600 milliGRAM(s) Oral three times a day PRN Temp greater or equal to 38C (100.4F)  ondansetron Injectable 4 milliGRAM(s) IV Push every 6 hours PRN Nausea  senna 2 Tablet(s) Oral at bedtime PRN Constipation      CAPILLARY BLOOD GLUCOSE    I&O's Summary    29 Mar 2025 07:01  -  30 Mar 2025 07:00  --------------------------------------------------------  IN: 0 mL / OUT: 1350 mL / NET: -1350 mL        PHYSICAL EXAM:  Vital Signs Last 24 Hrs  T(C): 36.7 (30 Mar 2025 04:42), Max: 38 (29 Mar 2025 22:15)  T(F): 98 (30 Mar 2025 04:42), Max: 100.4 (29 Mar 2025 22:15)  HR: 101 (30 Mar 2025 06:39) (60 - 101)  BP: 173/65 (30 Mar 2025 06:39) (144/55 - 179/76)  BP(mean): --  RR: 18 (30 Mar 2025 04:42) (18 - 18)  SpO2: 98% (30 Mar 2025 04:42) (95% - 98%)    Parameters below as of 30 Mar 2025 04:42  Patient On (Oxygen Delivery Method): room air    GENERAL: No acute distress,  intermittent lethargy  HEAD:  Atraumatic, Normocephalic  EYES: closes eyes  NECK: Supple, no JVD  CHEST/LUNG: CTAB; No wheezes, rales, or rhonchi  HEART: Regular rate and rhythm; No murmurs  ABDOMEN: Soft, non-tender, non-distended;  EXTREMITIES: No clubbing, cyanosis, or edema  NEUROLOGY: A&O x 0, no focal deficits,  SKIN: No rashes or lesions        LABS:                        9.4    7.69  )-----------( 356      ( 29 Mar 2025 06:47 )             27.9     03-29    145  |  111[H]  |  24[H]  ----------------------------<  97  4.2   |  25  |  0.8    Ca    7.9[L]      29 Mar 2025 06:47    TPro  5.4[L]  /  Alb  2.6[L]  /  TBili  0.3  /  DBili  x   /  AST  27  /  ALT  22  /  AlkPhos  109  03-29        Urinalysis Basic - ( 29 Mar 2025 06:47 )    Color: x / Appearance: x / SG: x / pH: x  Gluc: 97 mg/dL / Ketone: x  / Bili: x / Urobili: x   Blood: x / Protein: x / Nitrite: x   Leuk Esterase: x / RBC: x / WBC x   Sq Epi: x / Non Sq Epi: x / Bacteria: x          RADIOLOGY & ADDITIONAL TESTS:  Results Reviewed:   Imaging Personally Reviewed:  Electrocardiogram Personally Reviewed:    COORDINATION OF CARE:  Care Discussed with Consultants/Other Providers [Y/N]:  Prior or Outpatient Records Reviewed [Y/N]:

## 2025-03-31 ENCOUNTER — TRANSCRIPTION ENCOUNTER (OUTPATIENT)
Age: 85
End: 2025-03-31

## 2025-03-31 VITALS
DIASTOLIC BLOOD PRESSURE: 70 MMHG | RESPIRATION RATE: 18 BRPM | HEART RATE: 62 BPM | TEMPERATURE: 99 F | OXYGEN SATURATION: 97 % | SYSTOLIC BLOOD PRESSURE: 164 MMHG

## 2025-03-31 PROCEDURE — 99232 SBSQ HOSP IP/OBS MODERATE 35: CPT

## 2025-03-31 RX ORDER — AMLODIPINE BESYLATE 10 MG/1
1 TABLET ORAL
Qty: 0 | Refills: 0 | DISCHARGE
Start: 2025-03-31

## 2025-03-31 RX ORDER — SENNA 187 MG
2 TABLET ORAL
Qty: 0 | Refills: 0 | DISCHARGE
Start: 2025-03-31

## 2025-03-31 RX ORDER — MEROPENEM 1 G/30ML
2 INJECTION INTRAVENOUS
Qty: 0 | Refills: 0 | DISCHARGE
Start: 2025-03-31

## 2025-03-31 RX ORDER — QUETIAPINE FUMARATE 25 MG/1
0 TABLET ORAL
Refills: 0 | DISCHARGE

## 2025-03-31 RX ADMIN — SERTRALINE 50 MILLIGRAM(S): 100 TABLET, FILM COATED ORAL at 12:36

## 2025-03-31 RX ADMIN — MEROPENEM 280 MILLIGRAM(S): 1 INJECTION INTRAVENOUS at 14:26

## 2025-03-31 RX ADMIN — Medication 1 APPLICATION(S): at 05:32

## 2025-03-31 RX ADMIN — MEROPENEM 280 MILLIGRAM(S): 1 INJECTION INTRAVENOUS at 06:09

## 2025-03-31 RX ADMIN — AMLODIPINE BESYLATE 10 MILLIGRAM(S): 10 TABLET ORAL at 05:26

## 2025-03-31 RX ADMIN — FINASTERIDE 5 MILLIGRAM(S): 1 TABLET, FILM COATED ORAL at 12:36

## 2025-03-31 RX ADMIN — Medication 40 MILLIGRAM(S): at 05:26

## 2025-03-31 NOTE — DISCHARGE NOTE PROVIDER - NSDCFUSCHEDAPPT_GEN_ALL_CORE_FT
Rochelle Levine  [facility] PreAdmits  Scheduled Appointment: 04/02/2025    Rochelle Levine  [facility] Physician [facility]  PSYCHIATRY  [facility]  Scheduled Appointment: 04/02/2025    Humaira Gould  [facility] Physician [facility]  UROLOGY [address]  Scheduled Appointment: 04/23/2025    [place]  [facility] Physician [facility]  NEUROSURG [address]  Scheduled Appointment: 05/06/2025

## 2025-03-31 NOTE — PROGRESS NOTE ADULT - SUBJECTIVE AND OBJECTIVE BOX
Pt is seen and examined on  3/30  ( unable  to access the  EMR)  pt is awake and lying in bed  and  wife  at  bedside   pt seems comfortable and denies any complaints at this time          ROS:  Negative except for:    MEDICATIONS  (STANDING):  aMIOdarone    Tablet 100 milliGRAM(s) Oral <User Schedule>  amLODIPine   Tablet 10 milliGRAM(s) Oral every 24 hours  atorvastatin 10 milliGRAM(s) Oral at bedtime  chlorhexidine 2% Cloths 1 Application(s) Topical <User Schedule>  finasteride 5 milliGRAM(s) Oral daily  meropenem  IVPB 2000 milliGRAM(s) IV Intermittent every 8 hours  pantoprazole    Tablet 40 milliGRAM(s) Oral before breakfast  QUEtiapine 12.5 milliGRAM(s) Oral at bedtime  rivaroxaban 20 milliGRAM(s) Oral with dinner  sertraline 50 milliGRAM(s) Oral daily  tamsulosin 0.4 milliGRAM(s) Oral at bedtime    MEDICATIONS  (PRN):  acetaminophen     Tablet .. 650 milliGRAM(s) Oral every 6 hours PRN Temp greater or equal to 38C (100.4F), Mild Pain (1 - 3)  acetaminophen  Suppository .. 650 milliGRAM(s) Rectal every 6 hours PRN Temp greater or equal to 38C (100.4F), Mild Pain (1 - 3)  hydrALAZINE Injectable 10 milliGRAM(s) IV Push every 8 hours PRN SBP greater than 170  ibuprofen  Tablet. 600 milliGRAM(s) Oral three times a day PRN Temp greater or equal to 38C (100.4F)  ondansetron Injectable 4 milliGRAM(s) IV Push every 6 hours PRN Nausea  senna 2 Tablet(s) Oral at bedtime PRN Constipation      Allergies    No Known Allergies    Intolerances        Vital Signs Last 24 Hrs  T(C): 37.2 (31 Mar 2025 13:15), Max: 37.2 (31 Mar 2025 13:15)  T(F): 98.9 (31 Mar 2025 13:15), Max: 98.9 (31 Mar 2025 13:15)  HR: 62 (31 Mar 2025 13:15) (62 - 70)  BP: 164/70 (31 Mar 2025 13:15) (118/67 - 164/70)  BP(mean): --  RR: 18 (31 Mar 2025 13:15) (18 - 18)  SpO2: 97% (31 Mar 2025 13:15) (97% - 97%)    Parameters below as of 31 Mar 2025 13:15  Patient On (Oxygen Delivery Method): room air        PHYSICAL EXAM  General: adult in NAD  HEENT: clear oropharynx, anicteric sclera, pink conjunctiva  Neck: supple  CV: normal S1/S2 with no murmur rubs or gallops  Lungs: positive air movement b/l ant lungs,clear to auscultation, no wheezes, no rales  Abdomen: soft non-tender non-distended, no hepatosplenomegaly  Ext: no clubbing cyanosis or edema  Skin: no rashes and no petechiae  Neuro: alert and oriented X 4, no focal deficits  LABS:

## 2025-03-31 NOTE — PROGRESS NOTE ADULT - ASSESSMENT
1. SDH on DOAC   agree with holding off doAC    holding off drainage with close monitor for expansion of hematoma      2.  late onset alzheimer's dementia with hallucination    3 paroxysmal Afib on xarelto ..on hold >> restarted and d/c today      4  Aortic Stenosis s/p AV replacement; CAD s/p CABG, CHF,  HTN and hyperlipidema presenting with AMS    5. sepsis with bacteremia gNR   Zosyn 3.375 gram q 8 hours. (S/D of abx 3/19)   long term antibx>> s/p PICC line     will follow

## 2025-03-31 NOTE — DISCHARGE NOTE NURSING/CASE MANAGEMENT/SOCIAL WORK - FINANCIAL ASSISTANCE
[Consultation] : a consultation visit [facility] provides services at a reduced cost to those who are determined to be eligible through [facility]’s financial assistance program. Information regarding [facility]’s financial assistance program can be found by going to [email or web address] or by calling [phone].

## 2025-03-31 NOTE — DISCHARGE NOTE PROVIDER - CARE PROVIDERS DIRECT ADDRESSES
,DirectAddress_Unknown,[email or web address]

## 2025-03-31 NOTE — PROGRESS NOTE ADULT - PROVIDER SPECIALTY LIST ADULT
Hospitalist
Infectious Disease
Infectious Disease
Heme/Onc
Hospitalist
Heme/Onc
Hospitalist
Hospitalist
Heme/Onc
Heme/Onc
Hospitalist
Hospitalist
Infectious Disease

## 2025-03-31 NOTE — PROGRESS NOTE ADULT - ASSESSMENT
1- Thrombophilia  on anticoagulation  2- S/P  fall   3-  Dementia  4- Sepsis  on  Abx.  afebrile     continue  current  abx   PICC line   discussed  with  wife about  discharging  to  rehab NH

## 2025-03-31 NOTE — DISCHARGE NOTE PROVIDER - HOSPITAL COURSE
Mr Kell is a 85 YOM w a PMH of alzheimer's dementia with hallucination (followed by Dr. Juan Neurologist), HFpEF, HTN, paroxysmal Afib on xarelto , Aortic Stenosis s/p AV replacement; CAD s/p CABG and hyperlipidemia presenting with worsening confusion        Acute Cystis: ucx: pseudomonas aeruginosa   Bacteriemia: pseudomonas aeruginosa   Possible Endocarditis    SIRS present on admission >>progressed to sepsis   Likely Infectious from Acute cystitis and possibly some contribution from cerebral Hematoma/hygroma  Head CT: subacute to chronic subdural hematomas. No significant mass effect or midline shift.  Neurosurgery Recommended Transfer to [place] but Family declined understanding risk of brain herniation and death   Patient on Xarelto for AFIB: continue to hold for now AC for 4 weeks per Neurosurgery   Repeat Head CT: The density of the bilateral cerebral convexity and falcine subdural collections appears uniformly increased when compared to   the previous head CT probably reflecting IV contrast. Recommend short-term follow-up imaging.  2D-ECHO: EF 52%, Severe LVH, Bioprosthetic aortic valve with significant stenosis, Moderate prosthetic valve regurgitation.  Possible small, mobile vegetation on atrial-side of anterior leaflet. Cardiac CT without vegetations   PATIENT IS NOT A CANDIDATE FOR ALBERTO PER CARDIO, GIVEN BACTEREMIA  S/P PICC line placement >> IV plan for IV meropenem 2 gram q 8 hours from 3/24/2025.   Patient is expected to have intermittent fevers as source control cannot be obtained   -Will need repeat imaging of the head before stopping the abx. Repeat TTE in the outpatient setting.   Medically ready for discharge, pending Home IV abx set-up       Metabolic Encephalopathy>>still with intermittent lethargy  Advanced  Dementia   Most Likely infectious with some possibility of Subdural cerebral hematoma/hygroma  Neurosurgery consult appreciated>>Family refused transfer to [place]   **Recs to keep holding anticoagulation for at least 4 weeks with repeat CT head .O/P follow-up with Dr Supriya Amor   Neuro Endocrine: - Potential procedural planning for MMA embolization  will be delayed until the patient's infectious workup has been completed.    ID consult appreciated >>Continue IV antibiotics  Hold  Seroquel, continue daily re-orientation    Hypovolemic Hypernatremia  Continue IV hydration   Patient intermittently alert per feeding        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  AORTIC Stenosis s/p AV replacement   Paroxysmal AFIB + HTN + HLD   -BP now elevated, started on Norvasc and IV hydralazine for SBP greater than 160  -Continue Amiodarone , Statin, and DASH    Chronic BPH with acute urinary Retention   S/P Saenz catheter's insertion 3/25  continue Finasteride  and Flomax

## 2025-03-31 NOTE — DISCHARGE NOTE PROVIDER - CARE PROVIDER_API CALL
Germaine Minor  Internal Medicine  [address] [place] [id number]  Phone: [phone]  Fax: [phone]  Follow Up Time:     Mohan Benz  Infectious Disease  [address] [place] [id number]  Phone: [phone]  Fax: [phone]  Follow Up Time:

## 2025-03-31 NOTE — DISCHARGE NOTE PROVIDER - ATTENDING DISCHARGE PHYSICAL EXAMINATION:
GENERAL: No acute distress,  intermittent lethargy  HEAD:  Atraumatic, Normocephalic  EYES: closes eyes  NECK: Supple, no JVD  CHEST/LUNG: CTAB; No wheezes, rales, or rhonchi  HEART: Regular rate and rhythm; No murmurs  ABDOMEN: Soft, non-tender, non-distended;  EXTREMITIES: No clubbing, cyanosis, or edema  NEUROLOGY: A&O x 0, no focal deficits,  SKIN: No rashes or lesions

## 2025-03-31 NOTE — DISCHARGE NOTE NURSING/CASE MANAGEMENT/SOCIAL WORK - PATIENT PORTAL LINK FT
You can access the FollowMyHealth Patient Portal offered by [facility] by registering at the following website: [email or web address]. By joining [email or web address]’s FollowMyHealth portal, you will also be able to view your health information using other applications (apps) compatible with our system.

## 2025-03-31 NOTE — DISCHARGE NOTE PROVIDER - NSDCCPCAREPLAN_GEN_ALL_CORE_FT
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 and suspected endocarditis   complete youe antibiotics as prescribed      SECONDARY DISCHARGE DIAGNOSES  Diagnosis: Sepsis  Assessment and Plan of Treatment: Secondary to UTI, BActeriemia sandra suspected Endocarditis    Diagnosis: Subacute subdural hematoma  Assessment and Plan of Treatment: Neurosurgery evelauted you and recomeneded holding anticoagulation for 4 week, however family decided to re-start anticoagulation given history of blod clots and recent Clots in the lleft arm while in the hospital   You need to repeat another CT of the head in one month    Diagnosis: Acute urinary retention  Assessment and Plan of Treatment: [place] from debility, Now resolved  encourage ambulation    Diagnosis: Endocarditis, suspected  Assessment and Plan of Treatment: GIven Persistent fevers an ultrasound of your heart was done  it showed   You were deemed high risk for ALBERTO to better evaluate the heart valves given bacterimia   Continue antibiotics as precribed for 6 weeks   from 03/25/25-5/1/2025   YOu need a repeat heart ultrasound to evaluate your heart valves    Diagnosis: Toxic metabolic encephalopathy  Assessment and Plan of Treatment: Secondary to infection and worsened by baseline dementia   continue to re-orient daily, keep f amily pictures close by, a clock and calendar

## 2025-03-31 NOTE — DISCHARGE NOTE PROVIDER - NSDCMRMEDTOKEN_GEN_ALL_CORE_FT
amiodarone 100 mg oral tablet: 1 tab(s) orally every other day  amLODIPine 10 mg oral tablet: 1 tab(s) orally every 24 hours  atorvastatin 10 mg oral tablet: 1 tab(s) orally once a day  clotrimazole 1% topical cream: Apply topically to affected area 2 times a day  finasteride 5 mg oral tablet: 1 tab(s) orally once a day  furosemide 20 mg oral tablet: 1 tab(s) orally once a day  meropenem 2 g intravenous injection: 2 gram(s) intravenous every 8 hours  senna leaf extract oral tablet: 2 tab(s) orally once a day (at bedtime) As needed Constipation  Seroquel 25 mg oral tablet: 0.5 tab(s) orally once a day (at bedtime) Spouse gives med @ 1500  sertraline 50 mg oral tablet: 1 tab(s) orally once a day  solifenacin 10 mg oral tablet: 1 tab(s) orally once a day (at bedtime)  tamsulosin 0.4 mg oral capsule: 1 cap(s) orally once a day  Xarelto 20 mg oral tablet: 1 tab(s) orally once a day

## 2025-03-31 NOTE — PROGRESS NOTE ADULT - SUBJECTIVE AND OBJECTIVE BOX
HPI:  85 y/o man with PMH of late onset alzheimer's dementia with hallucination (followed by Dr. Juan Neurologist), paroxysmal Afib on xarelto ,   Aortic Stenosis s/p AV replacement; CAD s/p CABG, CHF,  HTN and hyperlipidema presenting with AMS that has been going on for the past 3 days.  fever noticed in the ed.  Patient is lethargic.  unable to obtain ros/hx  as per wife, patient might has fell several months ago  In course of hospitalization, pt found to have SDH while on DOAC.  Currently being on hold.   Patient declined transfer to [place] for neurosurgery evaluation with full understanding of risks, benefits, and alteratives  discussed case with neuro surgery who recommended to hold off the anticoagulation, and head CT scan in 6 hrs      (19 Mar 2025 23:59)        REVIEW OF SYSTEMS:  CONSTITUTIONAL: No fever or chills  HEENT: No sore throat  RESPIRATORY: No cough, no shortness of breath  CARDIOVASCULAR: No chest pain or palpitations  GASTROINTESTINAL: No abdominal or epigastric pain  GENITOURINARY: No dysuria  NEUROLOGICAL: No headache/dizziness  MSK: No joint pain, erythema, or swelling; no back pain  SKIN: No itching, rashes  All other ROS negative except noted above    PAST MEDICAL & SURGICAL HISTORY:  Afib      CHF (congestive heart failure)      Hypertension      S/P CABG (coronary artery bypass graft)    SOCIAL HISTORY: cannot obtain further history from the patient secondary to altered mental status or sedation      FAMILY HISTORY: No pertinent PMH for first degree relatives.     ANTIMICROBIALS:      ANTIMICROBIALS (past 90 days):  MEDICATIONS  (STANDING):    cefepime   IVPB   100 mL/Hr IV Intermittent (03-19-25 @ 19:00)    cefepime   IVPB   100 mL/Hr IV Intermittent (03-20-25 @ 06:01)    vancomycin  IVPB   250 mL/Hr IV Intermittent (03-20-25 @ 00:43)        OTHER MEDS:   MEDICATIONS  (STANDING):  acetaminophen     Tablet .. 650 every 6 hours PRN  acetaminophen   IVPB .. 1000 once  aMIOdarone    Tablet 100 <User Schedule>  atorvastatin 10 at bedtime  finasteride 5 daily  ondansetron Injectable 4 every 6 hours PRN  pantoprazole    Tablet 40 before breakfast  QUEtiapine 12.5 at bedtime  senna 2 at bedtime PRN  sertraline 50 daily  tamsulosin 0.4 at bedtime      VITALS:  Vital Signs Last 24 Hrs  T(F): 98.7 (03-20-25 @ 14:25), Max: 103.8 (03-19-25 @ 23:57)    Vital Signs Last 24 Hrs  HR: 63 (03-20-25 @ 14:25) (63 - 80)  BP: 138/59 (03-20-25 @ 14:25) (127/67 - 170/72)  RR: 20 (03-20-25 @ 14:25)  SpO2: 96% (03-20-25 @ 14:25) (94% - 97%)  Wt(kg): --    EXAM:  GENERAL: Somnolent. Grimacing with sternal rub.   HEAD: No head lesions  NECK: Supple  CHEST/LUNG: Shallow breath sounds.   HEART: S1 S2  ABDOMEN: Soft, nontender.  EXTREMITIES: No clubbing  NERVOUS SYSTEM: Somnolent.   MSK: No joint erythema, swelling or pain  SKIN: No rashes or lesions, no superficial thrombophlebitis    Labs:                        10.8   12.94 )-----------( 212      ( 19 Mar 2025 18:30 )             31.5   >> hb 8     03-19    143  |  106  |  29[H]  ----------------------------<  117[H]  4.4   |  27  |  1.1    Ca    8.3[L]      19 Mar 2025 18:30    TPro  6.0  /  Alb  3.2[L]  /  TBili  0.5  /  DBili  x   /  AST  28  /  ALT  31  /  AlkPhos  121[H]  03-19      WBC Trend:  WBC Count: 12.94 (03-19-25 @ 18:30)      Auto Neutrophil #: 3.96 K/uL (06-30-24 @ 07:21)  Auto Neutrophil #: 6.58 K/uL (06-28-24 @ 07:09)  Auto Neutrophil #: 6.55 K/uL (06-27-24 @ 06:53)  Auto Neutrophil #: 3.06 K/uL (06-25-24 @ 08:12)  Auto Neutrophil #: 3.41 K/uL (06-24-24 @ 18:40)            Trend LDH                INTERPRETATION:  CLINICAL INFORMATION: Fever, abdominal pain    COMPARISON: CT ABDOMEN/PELVIS 3/19/2025.    CONTRAST/COMPLICATIONS:  IV Contrast: Omnipaque 350  90 cc administered   10 cc discarded  Oral Contrast: NONE    PROCEDURE:  CT of the Abdomen and Pelvis was performed.  Sagittal and coronal reformats were performed.    FINDINGS:  LOWER CHEST: Bibasilar atelectasis. Poststernotomy. Cardiomegaly    LIVER: Within normal limits.  BILE DUCTS: Normal caliber.  GALLBLADDER: Cholelithiasis.  SPLEEN: Within normal limits.  PANCREAS: Within normal limits.  ADRENALS: Within normal limits.  KIDNEYS/URETERS: No renal stones or hydronephrosis.    BLADDER: Mild urinary bladder wall thickening  REPRODUCTIVE ORGANS: Unremarkable at CT.    BOWEL: Stool impaction the rectum measuring up to 9 cm. Mild degree of   perirectal fat stranding. Additional desiccated stool within the colon.  PERITONEUM/RETROPERITONEUM: Within normal limits.  VESSELS: Atherosclerotic changes with extensive visceral vascular   calcifications. Circumaortic left renal vein  LYMPH NODES: No lymphadenopathy.  ABDOMINAL WALL: Small fat-containing umbilical hernia  BONES: Osteopenia. Subacute/chronic left L2 transverse process deformity    IMPRESSION:  Stool impaction the rectum measuring up to 9 cm with mild to moderate   degree of perirectal fat stranding    --- End of Report ---            SCARLET SERVIN MD; Attending Radiologist  This document has been electronically signed. Mar 19 2025  8:22PM (03-19-25 @ 20:01)    < from: CT Head No Cont (03.20.25 @ 00:28) >  IMPRESSION:    Increased attenuation of the dural reflections and intracranial vessels   likely reflecting residual contrast from recent intravenous contrast   administration.    Redemonstration of bilateral hemispheric subdural collections likely   reflecting subacute to chronic subdural hematomas, grossly stable since   prior. No mass effect or midline shift.    ATTENDING ADDENDUM: The density of the bilateral cerebral convexity and   falcine subdural collections appears uniformly increased when compared to   the previous head CT probably reflecting IV contrast. Recommend   short-term follow-up imaging.    --- End of Report ---    < end of copied text >  < from: CT Head No Cont (03.19.25 @ 19:53) >  Motion limited examination.    Predominantly low density bilateral subdural collections are noted likely   reflecting subacute to chronic subdural hematomas. No significant mass   effect or midline shift.    < end of copied text >  < from: CT Chest w/ IV Cont (06.24.24 @ 20:14) >  IMPRESSION:    Cholelithiasis.    No evidence of thoracic, abdominal or pelvic visceral injury, laceration,   or hematoma.    No evidence of acute fracture.    --- End of Report ---    < end of copied text >

## 2025-04-01 LAB
CULTURE RESULTS: SIGNIFICANT CHANGE UP
SPECIMEN SOURCE: SIGNIFICANT CHANGE UP

## 2025-04-02 ENCOUNTER — APPOINTMENT (OUTPATIENT)
Dept: PSYCHIATRY | Facility: CLINIC | Age: 85
End: 2025-04-02

## 2025-04-08 DIAGNOSIS — I50.32 CHRONIC DIASTOLIC (CONGESTIVE) HEART FAILURE: ICD-10-CM

## 2025-04-08 DIAGNOSIS — G30.1 ALZHEIMER'S DISEASE WITH LATE ONSET: ICD-10-CM

## 2025-04-08 DIAGNOSIS — N39.0 URINARY TRACT INFECTION, SITE NOT SPECIFIED: ICD-10-CM

## 2025-04-08 DIAGNOSIS — K59.00 CONSTIPATION, UNSPECIFIED: ICD-10-CM

## 2025-04-08 DIAGNOSIS — I11.0 HYPERTENSIVE HEART DISEASE WITH HEART FAILURE: ICD-10-CM

## 2025-04-08 DIAGNOSIS — Z11.52 ENCOUNTER FOR SCREENING FOR COVID-19: ICD-10-CM

## 2025-04-08 DIAGNOSIS — D84.81 IMMUNODEFICIENCY DUE TO CONDITIONS CLASSIFIED ELSEWHERE: ICD-10-CM

## 2025-04-08 DIAGNOSIS — I82.A12 ACUTE EMBOLISM AND THROMBOSIS OF LEFT AXILLARY VEIN: ICD-10-CM

## 2025-04-08 DIAGNOSIS — F02.C2: ICD-10-CM

## 2025-04-08 DIAGNOSIS — N40.1 BENIGN PROSTATIC HYPERPLASIA WITH LOWER URINARY TRACT SYMPTOMS: ICD-10-CM

## 2025-04-08 DIAGNOSIS — A41.52 SEPSIS DUE TO PSEUDOMONAS: ICD-10-CM

## 2025-04-08 DIAGNOSIS — I33.0 ACUTE AND SUBACUTE INFECTIVE ENDOCARDITIS: ICD-10-CM

## 2025-04-08 DIAGNOSIS — G93.41 METABOLIC ENCEPHALOPATHY: ICD-10-CM

## 2025-04-08 DIAGNOSIS — Z79.01 LONG TERM (CURRENT) USE OF ANTICOAGULANTS: ICD-10-CM

## 2025-04-08 DIAGNOSIS — S06.5XAA TRAUMATIC SUBDURAL HEMORRHAGE WITH LOSS OF CONSCIOUSNESS STATUS UNKNOWN, INITIAL ENCOUNTER: ICD-10-CM

## 2025-04-08 DIAGNOSIS — E78.5 HYPERLIPIDEMIA, UNSPECIFIED: ICD-10-CM

## 2025-04-08 DIAGNOSIS — Z66 DO NOT RESUSCITATE: ICD-10-CM

## 2025-04-08 DIAGNOSIS — N12 TUBULO-INTERSTITIAL NEPHRITIS, NOT SPECIFIED AS ACUTE OR CHRONIC: ICD-10-CM

## 2025-04-08 DIAGNOSIS — G92.8 OTHER TOXIC ENCEPHALOPATHY: ICD-10-CM

## 2025-04-08 DIAGNOSIS — Z95.3 PRESENCE OF XENOGENIC HEART VALVE: ICD-10-CM

## 2025-04-08 DIAGNOSIS — R33.8 OTHER RETENTION OF URINE: ICD-10-CM

## 2025-04-08 DIAGNOSIS — I48.0 PAROXYSMAL ATRIAL FIBRILLATION: ICD-10-CM

## 2025-04-08 DIAGNOSIS — G96.00 CEREBROSPINAL FLUID LEAK, UNSPECIFIED: ICD-10-CM

## 2025-04-08 DIAGNOSIS — E87.0 HYPEROSMOLALITY AND HYPERNATREMIA: ICD-10-CM

## 2025-04-23 ENCOUNTER — APPOINTMENT (OUTPATIENT)
Dept: UROLOGY | Facility: CLINIC | Age: 85
End: 2025-04-23

## 2025-05-07 ENCOUNTER — APPOINTMENT (OUTPATIENT)
Dept: NEUROSURGERY | Facility: CLINIC | Age: 85
End: 2025-05-07

## 2025-05-27 ENCOUNTER — APPOINTMENT (OUTPATIENT)
Dept: NEUROSURGERY | Facility: CLINIC | Age: 85
End: 2025-05-27

## 2025-06-04 ENCOUNTER — APPOINTMENT (OUTPATIENT)
Dept: UROLOGY | Facility: CLINIC | Age: 85
End: 2025-06-04
Payer: MEDICARE

## 2025-06-04 VITALS
HEIGHT: 70 IN | OXYGEN SATURATION: 98 % | TEMPERATURE: 97.8 F | HEART RATE: 52 BPM | RESPIRATION RATE: 18 BRPM | WEIGHT: 162 LBS | BODY MASS INDEX: 23.19 KG/M2 | SYSTOLIC BLOOD PRESSURE: 123 MMHG | DIASTOLIC BLOOD PRESSURE: 59 MMHG

## 2025-06-04 DIAGNOSIS — N40.1 BENIGN PROSTATIC HYPERPLASIA WITH LOWER URINARY TRACT SYMPMS: ICD-10-CM

## 2025-06-04 DIAGNOSIS — N13.8 BENIGN PROSTATIC HYPERPLASIA WITH LOWER URINARY TRACT SYMPMS: ICD-10-CM

## 2025-06-04 DIAGNOSIS — K59.00 CONSTIPATION, UNSPECIFIED: ICD-10-CM

## 2025-06-04 DIAGNOSIS — R33.9 RETENTION OF URINE, UNSPECIFIED: ICD-10-CM

## 2025-06-04 PROCEDURE — 99215 OFFICE O/P EST HI 40 MIN: CPT

## 2025-06-04 RX ORDER — AMLODIPINE BESYLATE 10 MG/1
10 TABLET ORAL
Refills: 0 | Status: ACTIVE | COMMUNITY

## 2025-06-04 RX ORDER — FUROSEMIDE 20 MG/1
20 TABLET ORAL
Refills: 0 | Status: ACTIVE | COMMUNITY

## 2025-06-04 RX ORDER — IPRATROPIUM BROMIDE AND ALBUTEROL SULFATE 2.5; .5 MG/3ML; MG/3ML
0.5-2.5 (3) SOLUTION RESPIRATORY (INHALATION)
Refills: 0 | Status: ACTIVE | COMMUNITY

## 2025-06-04 RX ORDER — HYDRALAZINE HYDROCHLORIDE 10 MG/1
10 TABLET ORAL
Refills: 0 | Status: ACTIVE | COMMUNITY

## 2025-06-16 ENCOUNTER — NON-APPOINTMENT (OUTPATIENT)
Age: 85
End: 2025-06-16

## 2025-07-02 ENCOUNTER — APPOINTMENT (OUTPATIENT)
Dept: UROLOGY | Facility: CLINIC | Age: 85
End: 2025-07-02

## 2025-07-08 ENCOUNTER — APPOINTMENT (OUTPATIENT)
Dept: UROLOGY | Facility: CLINIC | Age: 85
End: 2025-07-08